# Patient Record
Sex: FEMALE | Race: WHITE | NOT HISPANIC OR LATINO | URBAN - METROPOLITAN AREA
[De-identification: names, ages, dates, MRNs, and addresses within clinical notes are randomized per-mention and may not be internally consistent; named-entity substitution may affect disease eponyms.]

---

## 2017-07-02 ENCOUNTER — INPATIENT (INPATIENT)
Facility: HOSPITAL | Age: 82
LOS: 7 days | Discharge: NURSING, REHAB OR LONG-TERM CARE | End: 2017-07-10
Attending: HOSPITALIST | Admitting: HOSPITALIST
Payer: MEDICARE

## 2017-07-02 VITALS
TEMPERATURE: 98 F | OXYGEN SATURATION: 100 % | DIASTOLIC BLOOD PRESSURE: 114 MMHG | SYSTOLIC BLOOD PRESSURE: 136 MMHG | RESPIRATION RATE: 20 BRPM | HEART RATE: 130 BPM

## 2017-07-02 DIAGNOSIS — I48.91 UNSPECIFIED ATRIAL FIBRILLATION: ICD-10-CM

## 2017-07-02 DIAGNOSIS — Z96.649 PRESENCE OF UNSPECIFIED ARTIFICIAL HIP JOINT: Chronic | ICD-10-CM

## 2017-07-02 DIAGNOSIS — I10 ESSENTIAL (PRIMARY) HYPERTENSION: ICD-10-CM

## 2017-07-02 DIAGNOSIS — E11.9 TYPE 2 DIABETES MELLITUS WITHOUT COMPLICATIONS: ICD-10-CM

## 2017-07-02 DIAGNOSIS — Z29.9 ENCOUNTER FOR PROPHYLACTIC MEASURES, UNSPECIFIED: ICD-10-CM

## 2017-07-02 DIAGNOSIS — M25.551 PAIN IN RIGHT HIP: ICD-10-CM

## 2017-07-02 DIAGNOSIS — N17.9 ACUTE KIDNEY FAILURE, UNSPECIFIED: ICD-10-CM

## 2017-07-02 LAB
ALBUMIN SERPL ELPH-MCNC: 3.9 G/DL — SIGNIFICANT CHANGE UP (ref 3.3–5)
ALP SERPL-CCNC: 103 U/L — SIGNIFICANT CHANGE UP (ref 40–120)
ALT FLD-CCNC: 9 U/L — SIGNIFICANT CHANGE UP (ref 4–33)
APTT BLD: 28.5 SEC — SIGNIFICANT CHANGE UP (ref 27.5–37.4)
AST SERPL-CCNC: 20 U/L — SIGNIFICANT CHANGE UP (ref 4–32)
BASOPHILS # BLD AUTO: 0.1 K/UL — SIGNIFICANT CHANGE UP (ref 0–0.2)
BASOPHILS NFR BLD AUTO: 1.1 % — SIGNIFICANT CHANGE UP (ref 0–2)
BILIRUB SERPL-MCNC: 0.3 MG/DL — SIGNIFICANT CHANGE UP (ref 0.2–1.2)
BLD GP AB SCN SERPL QL: NEGATIVE — SIGNIFICANT CHANGE UP
BUN SERPL-MCNC: 34 MG/DL — HIGH (ref 7–23)
CALCIUM SERPL-MCNC: 9.7 MG/DL — SIGNIFICANT CHANGE UP (ref 8.4–10.5)
CHLORIDE SERPL-SCNC: 97 MMOL/L — LOW (ref 98–107)
CK MB BLD-MCNC: 2.39 NG/ML — SIGNIFICANT CHANGE UP (ref 1–4.7)
CK MB BLD-MCNC: SIGNIFICANT CHANGE UP (ref 0–2.5)
CK SERPL-CCNC: 88 U/L — SIGNIFICANT CHANGE UP (ref 25–170)
CO2 SERPL-SCNC: 28 MMOL/L — SIGNIFICANT CHANGE UP (ref 22–31)
CREAT SERPL-MCNC: 1.56 MG/DL — HIGH (ref 0.5–1.3)
EOSINOPHIL # BLD AUTO: 0.08 K/UL — SIGNIFICANT CHANGE UP (ref 0–0.5)
EOSINOPHIL NFR BLD AUTO: 0.9 % — SIGNIFICANT CHANGE UP (ref 0–6)
GLUCOSE SERPL-MCNC: 284 MG/DL — HIGH (ref 70–99)
HCT VFR BLD CALC: 40.7 % — SIGNIFICANT CHANGE UP (ref 34.5–45)
HGB BLD-MCNC: 12.9 G/DL — SIGNIFICANT CHANGE UP (ref 11.5–15.5)
IMM GRANULOCYTES # BLD AUTO: 0.03 # — SIGNIFICANT CHANGE UP
IMM GRANULOCYTES NFR BLD AUTO: 0.3 % — SIGNIFICANT CHANGE UP (ref 0–1.5)
INR BLD: 1.03 — SIGNIFICANT CHANGE UP (ref 0.88–1.17)
LYMPHOCYTES # BLD AUTO: 1.23 K/UL — SIGNIFICANT CHANGE UP (ref 1–3.3)
LYMPHOCYTES # BLD AUTO: 14 % — SIGNIFICANT CHANGE UP (ref 13–44)
MCHC RBC-ENTMCNC: 28.2 PG — SIGNIFICANT CHANGE UP (ref 27–34)
MCHC RBC-ENTMCNC: 31.7 % — LOW (ref 32–36)
MCV RBC AUTO: 88.9 FL — SIGNIFICANT CHANGE UP (ref 80–100)
MONOCYTES # BLD AUTO: 0.75 K/UL — SIGNIFICANT CHANGE UP (ref 0–0.9)
MONOCYTES NFR BLD AUTO: 8.5 % — SIGNIFICANT CHANGE UP (ref 2–14)
NEUTROPHILS # BLD AUTO: 6.59 K/UL — SIGNIFICANT CHANGE UP (ref 1.8–7.4)
NEUTROPHILS NFR BLD AUTO: 75.2 % — SIGNIFICANT CHANGE UP (ref 43–77)
NRBC # FLD: 0 — SIGNIFICANT CHANGE UP
PLATELET # BLD AUTO: 256 K/UL — SIGNIFICANT CHANGE UP (ref 150–400)
PMV BLD: 11.8 FL — SIGNIFICANT CHANGE UP (ref 7–13)
POTASSIUM SERPL-MCNC: 3.9 MMOL/L — SIGNIFICANT CHANGE UP (ref 3.5–5.3)
POTASSIUM SERPL-SCNC: 3.9 MMOL/L — SIGNIFICANT CHANGE UP (ref 3.5–5.3)
PROT SERPL-MCNC: 7.5 G/DL — SIGNIFICANT CHANGE UP (ref 6–8.3)
PROTHROM AB SERPL-ACNC: 11.6 SEC — SIGNIFICANT CHANGE UP (ref 9.8–13.1)
RBC # BLD: 4.58 M/UL — SIGNIFICANT CHANGE UP (ref 3.8–5.2)
RBC # FLD: 14.4 % — SIGNIFICANT CHANGE UP (ref 10.3–14.5)
RH IG SCN BLD-IMP: POSITIVE — SIGNIFICANT CHANGE UP
SODIUM SERPL-SCNC: 139 MMOL/L — SIGNIFICANT CHANGE UP (ref 135–145)
TROPONIN T SERPL-MCNC: < 0.06 NG/ML — SIGNIFICANT CHANGE UP (ref 0–0.06)
WBC # BLD: 8.78 K/UL — SIGNIFICANT CHANGE UP (ref 3.8–10.5)
WBC # FLD AUTO: 8.78 K/UL — SIGNIFICANT CHANGE UP (ref 3.8–10.5)

## 2017-07-02 PROCEDURE — 73502 X-RAY EXAM HIP UNI 2-3 VIEWS: CPT | Mod: 26,RT

## 2017-07-02 PROCEDURE — 72192 CT PELVIS W/O DYE: CPT | Mod: 26

## 2017-07-02 PROCEDURE — 99223 1ST HOSP IP/OBS HIGH 75: CPT | Mod: AI

## 2017-07-02 PROCEDURE — 73552 X-RAY EXAM OF FEMUR 2/>: CPT | Mod: 26,RT

## 2017-07-02 PROCEDURE — 71020: CPT | Mod: 26

## 2017-07-02 RX ORDER — SODIUM CHLORIDE 9 MG/ML
1000 INJECTION, SOLUTION INTRAVENOUS
Qty: 0 | Refills: 0 | Status: DISCONTINUED | OUTPATIENT
Start: 2017-07-02 | End: 2017-07-10

## 2017-07-02 RX ORDER — DEXTROSE 50 % IN WATER 50 %
1 SYRINGE (ML) INTRAVENOUS ONCE
Qty: 0 | Refills: 0 | Status: DISCONTINUED | OUTPATIENT
Start: 2017-07-02 | End: 2017-07-10

## 2017-07-02 RX ORDER — DEXTROSE 50 % IN WATER 50 %
25 SYRINGE (ML) INTRAVENOUS ONCE
Qty: 0 | Refills: 0 | Status: DISCONTINUED | OUTPATIENT
Start: 2017-07-02 | End: 2017-07-10

## 2017-07-02 RX ORDER — SODIUM CHLORIDE 9 MG/ML
1000 INJECTION INTRAMUSCULAR; INTRAVENOUS; SUBCUTANEOUS
Qty: 0 | Refills: 0 | Status: DISCONTINUED | OUTPATIENT
Start: 2017-07-02 | End: 2017-07-10

## 2017-07-02 RX ORDER — ACETAMINOPHEN 500 MG
650 TABLET ORAL EVERY 6 HOURS
Qty: 0 | Refills: 0 | Status: DISCONTINUED | OUTPATIENT
Start: 2017-07-02 | End: 2017-07-10

## 2017-07-02 RX ORDER — TRAMADOL HYDROCHLORIDE 50 MG/1
25 TABLET ORAL ONCE
Qty: 0 | Refills: 0 | Status: DISCONTINUED | OUTPATIENT
Start: 2017-07-02 | End: 2017-07-02

## 2017-07-02 RX ORDER — SIMVASTATIN 20 MG/1
10 TABLET, FILM COATED ORAL AT BEDTIME
Qty: 0 | Refills: 0 | Status: DISCONTINUED | OUTPATIENT
Start: 2017-07-02 | End: 2017-07-10

## 2017-07-02 RX ORDER — ASPIRIN/CALCIUM CARB/MAGNESIUM 324 MG
81 TABLET ORAL DAILY
Qty: 0 | Refills: 0 | Status: DISCONTINUED | OUTPATIENT
Start: 2017-07-02 | End: 2017-07-02

## 2017-07-02 RX ORDER — INSULIN LISPRO 100/ML
VIAL (ML) SUBCUTANEOUS
Qty: 0 | Refills: 0 | Status: DISCONTINUED | OUTPATIENT
Start: 2017-07-02 | End: 2017-07-10

## 2017-07-02 RX ORDER — SODIUM CHLORIDE 9 MG/ML
500 INJECTION INTRAMUSCULAR; INTRAVENOUS; SUBCUTANEOUS ONCE
Qty: 0 | Refills: 0 | Status: COMPLETED | OUTPATIENT
Start: 2017-07-02 | End: 2017-07-02

## 2017-07-02 RX ORDER — MORPHINE SULFATE 50 MG/1
4 CAPSULE, EXTENDED RELEASE ORAL ONCE
Qty: 0 | Refills: 0 | Status: DISCONTINUED | OUTPATIENT
Start: 2017-07-02 | End: 2017-07-02

## 2017-07-02 RX ORDER — GLUCAGON INJECTION, SOLUTION 0.5 MG/.1ML
1 INJECTION, SOLUTION SUBCUTANEOUS ONCE
Qty: 0 | Refills: 0 | Status: DISCONTINUED | OUTPATIENT
Start: 2017-07-02 | End: 2017-07-10

## 2017-07-02 RX ORDER — INSULIN LISPRO 100/ML
VIAL (ML) SUBCUTANEOUS AT BEDTIME
Qty: 0 | Refills: 0 | Status: DISCONTINUED | OUTPATIENT
Start: 2017-07-02 | End: 2017-07-10

## 2017-07-02 RX ORDER — DEXTROSE 50 % IN WATER 50 %
12.5 SYRINGE (ML) INTRAVENOUS ONCE
Qty: 0 | Refills: 0 | Status: DISCONTINUED | OUTPATIENT
Start: 2017-07-02 | End: 2017-07-10

## 2017-07-02 RX ORDER — DILTIAZEM HCL 120 MG
360 CAPSULE, EXT RELEASE 24 HR ORAL DAILY
Qty: 0 | Refills: 0 | Status: DISCONTINUED | OUTPATIENT
Start: 2017-07-03 | End: 2017-07-10

## 2017-07-02 RX ORDER — HYDROCHLOROTHIAZIDE 25 MG
25 TABLET ORAL EVERY OTHER DAY
Qty: 0 | Refills: 0 | Status: DISCONTINUED | OUTPATIENT
Start: 2017-07-02 | End: 2017-07-10

## 2017-07-02 RX ORDER — SODIUM CHLORIDE 9 MG/ML
3 INJECTION INTRAMUSCULAR; INTRAVENOUS; SUBCUTANEOUS EVERY 8 HOURS
Qty: 0 | Refills: 0 | Status: DISCONTINUED | OUTPATIENT
Start: 2017-07-02 | End: 2017-07-10

## 2017-07-02 RX ADMIN — TRAMADOL HYDROCHLORIDE 25 MILLIGRAM(S): 50 TABLET ORAL at 22:55

## 2017-07-02 RX ADMIN — TRAMADOL HYDROCHLORIDE 25 MILLIGRAM(S): 50 TABLET ORAL at 21:55

## 2017-07-02 RX ADMIN — MORPHINE SULFATE 4 MILLIGRAM(S): 50 CAPSULE, EXTENDED RELEASE ORAL at 14:39

## 2017-07-02 RX ADMIN — SODIUM CHLORIDE 75 MILLILITER(S): 9 INJECTION INTRAMUSCULAR; INTRAVENOUS; SUBCUTANEOUS at 22:10

## 2017-07-02 RX ADMIN — MORPHINE SULFATE 4 MILLIGRAM(S): 50 CAPSULE, EXTENDED RELEASE ORAL at 14:10

## 2017-07-02 RX ADMIN — SODIUM CHLORIDE 500 MILLILITER(S): 9 INJECTION INTRAMUSCULAR; INTRAVENOUS; SUBCUTANEOUS at 14:48

## 2017-07-02 RX ADMIN — SIMVASTATIN 10 MILLIGRAM(S): 20 TABLET, FILM COATED ORAL at 22:09

## 2017-07-02 RX ADMIN — SODIUM CHLORIDE 3 MILLILITER(S): 9 INJECTION INTRAMUSCULAR; INTRAVENOUS; SUBCUTANEOUS at 21:54

## 2017-07-02 NOTE — H&P ADULT - PROBLEM SELECTOR PLAN 2
Pain management  Patient was found to have possible ilopsoas hematoma v strain on CT. Ortho consult and vascular called for further recommendations.  will hold asa for now

## 2017-07-02 NOTE — ED PROVIDER NOTE - ATTENDING CONTRIBUTION TO CARE
Dr. Velasquez: I have personally performed a face to face bedside history and physical examination of this patient. I have discussed the history, examination, review of systems, assessment and plan of management with the resident. I have reviewed the electronic medical record and amended it to reflect my history, review of systems, physical exam, assessment and plan.  87F h/o newly dx'ed Afib Dr. Velasquez: I have personally performed a face to face bedside history and physical examination of this patient. I have discussed the history, examination, review of systems, assessment and plan of management with the resident. I have reviewed the electronic medical record and amended it to reflect my history, review of systems, physical exam, assessment and plan.  87F h/o newly dx'ed Afib recently but not on AC, on diltiazem, DM, HTN, HLD p/w atraumatic R thigh pain for a while, worse today. Has h/o L hip surgery 3 years ago. Pt found to be in rapid afib with RVR 130s. Pt denies cp, sob, palps, nausea, vomiting, abdo pain. States has been unable to bear weight.  On exam pt appears well, nad, irreg irreg tachy, ctab, abdo soft/nt/nd, pelvis stable, +pain with ROM of R hip, +DP, R hip mildly external rotated but not shortened.  Plan - pain ctrl, slow down rapid afib, Leah, xray r/o fx, admit

## 2017-07-02 NOTE — ED ADULT NURSE NOTE - OBJECTIVE STATEMENT
Pt received to room 14. Pt complaining of right thigh pain. Pt states she has been having this pain for a long time. Pt states she broke her left hip 3 years ago. Pt states the pain in her right hip has got worse, no relief at home. Pt denies any recent falls. Pt denies any SOB or chest pain. Pt states she was recently diagnosed with AFIB. Pt placed on cardiac monitor.  IV access obtained, labs drawn and sent.Will continue to monitor.

## 2017-07-02 NOTE — H&P ADULT - ATTENDING COMMENTS
87 F with PMHx of DM2, HTN, L hip fx s/p ORIF 3 years ago, recent dx of Afib on ASA, had out-patient TTE, unsure of results, p/w Afib with RVR, R groin pain radiating to thigh x 1 month without hx of trauma. patient unsure if she took home meds today. rapid Afib can be secondary to missed home meds vs pain mediated. currently rate controlled after 30mg of PO Cardizem. would continue home Cardizem for now, monitor on tele, trend CE, check TSH. hold off on TTE as will attempt to obtain records from out-patient cardiologist. R hip/groin pain with prelim x-ray being unremarkable, follow up CT r/o occult Fx or bony lesion. if CT is unremarkable would consider MRI to r/o soft tissue etiology. mild TRACY likely pre-renal, start gentle hydration with NS@75cc/hr, hold home ARB in setting of TRACY, avoid other nephrotoxics and monitor Cr. PT eval pending imaging. hold home oral hypoglycemics, start NISS, check A1C. DVT PPX. fall precautions.

## 2017-07-02 NOTE — ED PROVIDER NOTE - MEDICAL DECISION MAKING DETAILS
afib with rvr, r hip pain x 3 weeks, no falls/trauma, on diltazem, will get x-ray, pain contorl, push of dilt and re-assess

## 2017-07-02 NOTE — ED PROVIDER NOTE - CRITICAL CARE PROVIDED
documentation/direct patient care (not related to procedure)/interpretation of diagnostic studies/additional history taking/consultation with other physicians/consult w/ pt's family directly relating to pts condition

## 2017-07-02 NOTE — H&P ADULT - PROBLEM SELECTOR PLAN 1
Admit to tele  check cbc, bmp, a1c, flp, tsh, trend CE   [id number] score of 5  Will hold AC now given patient with possible illopsoas hematoma on CT  Restart home jaun   D/W Dr. Linares

## 2017-07-02 NOTE — CONSULT NOTE ADULT - SUBJECTIVE AND OBJECTIVE BOX
A TEAM SURGERY (#[id number]) CONSULT NOTE  ---------------------------------------------------------------------------------------------     HPI: Patient is a 87y old  Female who presents with a chief complaint of R hip pain and weakness.  Patient has been having worsening weakness of the right hip over the past 2-3 months, with increasing pain.  She had been previously able to ambulate, and now is unable to walk secondary to the weakness.  She presents to the hospital for further evaluation, and was found to be in Afib with RVR.      Patient denies fevers/chills, denies lightheadedness/dizziness, denies SOB/chest pain, denies nausea/vomiting, denies constipation/diarrhea.     ROS: 10-system review is otherwise negative except HPI above.      PAST MEDICAL & SURGICAL HISTORY:  HLD (hyperlipidemia)  DM2 (diabetes mellitus, type 2)  HTN (hypertension)  Arthritis involving multiple sites  S/P hip replacement    FAMILY HISTORY:  [] Family history not pertinent as reviewed with the patient and family    ALLERGIES: No Known Allergies    HOME MEDICATIONS:   HCTZ, ASA, Pioglitazone, simvastatin, januvia, glipizide, irbesartan, taztia    CURRENT MEDICATIONS  MEDICATIONS (STANDING): sodium chloride 0.9% lock flush 3 milliLiter(s) IV Push every 8 hours  insulin lispro (HumaLOG) corrective regimen sliding scale   SubCutaneous three times a day before meals  insulin lispro (HumaLOG) corrective regimen sliding scale   SubCutaneous at bedtime  dextrose 5%. 1000 milliLiter(s) IV Continuous <Continuous>  dextrose 50% Injectable 12.5 Gram(s) IV Push once  dextrose 50% Injectable 25 Gram(s) IV Push once  dextrose 50% Injectable 25 Gram(s) IV Push once  simvastatin 10 milliGRAM(s) Oral at bedtime  hydrochlorothiazide 25 milliGRAM(s) Oral every other day  sodium chloride 0.9%. 1000 milliLiter(s) IV Continuous <Continuous>  diltiazem    milliGRAM(s) Oral daily    MEDICATIONS (PRN):acetaminophen   Tablet. 650 milliGRAM(s) Oral every 6 hours PRN Mild Pain (1 - 3)/Moderate/Severe Pain  dextrose Gel 1 Dose(s) Oral once PRN Blood Glucose LESS THAN 70 milliGRAM(s)/deciliter  glucagon  Injectable 1 milliGRAM(s) IntraMuscular once PRN Glucose LESS THAN 70 milligrams/deciliter    --------------------------------------------------------------------------------------------  Vitals:   T(C): 37 (07-02-17 @ 21:07), Max: 37 (07-02-17 @ 21:07)  HR: 81 (07-02-17 @ 21:07) (81 - 130)  BP: 165/85 (07-02-17 @ 21:07) (133/68 - 165/85)  BP(mean): 131 (07-02-17 @ 21:07) (131 - 131)  RR: 16 (07-02-17 @ 21:07) (16 - 20)  SpO2: 98% (07-02-17 @ 21:07) (98% - 100%)  CAPILLARY BLOOD GLUCOSE  178 (02 Jul 2017 21:27)      PHYSICAL EXAM:   General: NAD, Lying in bed comfortably  Neuro: A+Ox3  HEENT: NC/AT, EOMI  Cardio: RRR, nml S1/S2  Resp: Good effort, CTA b/l  GI/Abd: Soft, NT/ND, no rebound/guarding  Vascular: All 4 extremities warm.  Skin: Intact, no breakdown  Lymphatic/Nodes: No palpable lymphadenopathy  Musculoskeletal: All 4 extremities moving spontaneously, RLE with 1/5 strength in hip flexion, mild proximal thigh tenderness, no skin changes, no ecchymosis, distal motor/sensory intact.    --------------------------------------------------------------------------------------------  LABS  CBC (07-02 @ 13:59)                              12.9                           8.78    )----------------(  256        75.2  % Neutrophils, 14.0  % Lymphocytes, ANC: 6.59                                40.7      BMP (07-02 @ 13:59)             139     |  97<L>   |  34<H> 		Ca++ --      Ca 9.7                ---------------------------------( 284<H>		Mg --                 3.9     |  28      |  1.56<H>			Ph --        LFTs (07-02 @ 13:59)      TPro 7.5 / Alb 3.9 / TBili 0.3 / DBili -- / AST 20 / ALT 9 / AlkPhos 103    Coags (07-02 @ 14:03)  aPTT 28.5 / INR 1.03 / PT 11.6    Cardiac Markers (07-02 @ 13:59)     Trop: < 0.06 -- / CKMB: 2.39 / CK: 88    --------------------------------------------------------------------------------------------    IMAGING  < from: CT Pelvis No Cont (07.02.17 @ 17:09) >  Status post internal fixation of the left proximal femur. No obvious   fracture or dislocation, given the extent of artifact. Right iliopsoas   muscle hematoma/strain.   < end of copied text >    --------------------------------------------------------------------------------------------

## 2017-07-02 NOTE — ED PROVIDER NOTE - PMH
Arthritis involving multiple sites    DM2 (diabetes mellitus, type 2)    HLD (hyperlipidemia)    HTN (hypertension)

## 2017-07-02 NOTE — ED PROVIDER NOTE - PROGRESS NOTE DETAILS
Dr. Velasquez: Pt continues to c/o R hip pain and needs help while ambulating. Pt wanted to go home but explained importance of admission given afib with rvr, moises and trouble ambulating. Pt lives alone, family lives in [place] and is currently in [facility]". Pt agreeable w admission

## 2017-07-02 NOTE — ED PROVIDER NOTE - OBJECTIVE STATEMENT
87M with dm, htn, afib (dx last week - on dilt, on ASA) p/w r thigh pain for "A long time" broke left hip 3 years ago s/p in pin and now has r hip pain; no falls; no cp/sob/v/diarrhea/dysuria;    meds: dilt, irbasartan, januvia, asa,

## 2017-07-02 NOTE — CONSULT NOTE ADULT - ASSESSMENT
ASSESSMENT: Patient is a 87y old f with r hip pain/weakness, concern for R iliopsoas hematoma    PLAN:   - No general surgical intervention  - wound rec. repeat CT pelvis with IV contrast when TRACY resolves for better evaluation  - Patient and plan discussed with Attending, Dr. Ron Heath MD PGY3  A Team Surgery, #[id number]

## 2017-07-02 NOTE — ED PROVIDER NOTE - CARE PLAN
Principal Discharge DX:	Atrial fibrillation with RVR Principal Discharge DX:	Atrial fibrillation with RVR  Secondary Diagnosis:	TRACY (acute kidney injury)  Secondary Diagnosis:	Hip pain, acute, right

## 2017-07-02 NOTE — ED ADULT NURSE REASSESSMENT NOTE - NS ED NURSE REASSESS COMMENT FT1
RN Break Coverage: received report on pt. pt presents awake a&ox4, c/o pain to right hip. denies any further symptoms or complaints. skin warm, dry, appropriate for race. respirations even unlabored. denies  cp or sob. ivl site clean dry intact patent. cardiac monitor in place in afib. family at bedside. tele pa called to speak with patient regarding admission status. safety maintained.

## 2017-07-02 NOTE — H&P ADULT - HISTORY OF PRESENT ILLNESS
87M with DM, HTN, Afib (dx last week - on dilt, on ASA) p/w R hip pain and afib. Patient states she has been having R hip pain which radiates to her R knee x 1 month. She states she has no falls or other injuries. She was taking Tylenol for pain which provided some relief. She was following up with her PMD and had xray done. She was getting brunch this AM and could not walk secondary due to the pain. Then, she called EMS and was brought into the ED. Patient went into an episode of rapid afib in ED. patient was diagnosed with afib a week ago and was started on ASA. Denies fever, chills, cough, chest pain, falls, LOC, 87M with DM, HTN, Afib (dx last week - on dilt, on ASA) p/w R hip pain and afib. Patient states she has been having R hip pain which radiates to her R knee x 1 month. She states she has no falls or other injuries. She was taking Tylenol for pain which provided some relief. She was following up with her PMD and had xray done. She was getting brunch this AM and could not walk secondary due to the pain. Then, she called EMS and was brought into the ED. Patient went into an episode of rapid afib in ED. patient was diagnosed with afib a week ago and was started on ASA. Denies fever, chills, cough, chest pain, falls, LOC, abdominal pain, melena, hematochezi

## 2017-07-02 NOTE — ED ADULT TRIAGE NOTE - CHIEF COMPLAINT QUOTE
States"  Patient came in c/o pain to right groin and right hip radiating to right knee" denies injury. patient is a new onset Afib since 2 weeks ago, patient is not on any blood thinners.

## 2017-07-03 DIAGNOSIS — S70.11XA CONTUSION OF RIGHT THIGH, INITIAL ENCOUNTER: ICD-10-CM

## 2017-07-03 LAB
APPEARANCE UR: CLEAR — SIGNIFICANT CHANGE UP
BASOPHILS # BLD AUTO: 0.06 K/UL — SIGNIFICANT CHANGE UP (ref 0–0.2)
BASOPHILS NFR BLD AUTO: 0.7 % — SIGNIFICANT CHANGE UP (ref 0–2)
BILIRUB UR-MCNC: NEGATIVE — SIGNIFICANT CHANGE UP
BLOOD UR QL VISUAL: NEGATIVE — SIGNIFICANT CHANGE UP
BUN SERPL-MCNC: 26 MG/DL — HIGH (ref 7–23)
CALCIUM SERPL-MCNC: 9.6 MG/DL — SIGNIFICANT CHANGE UP (ref 8.4–10.5)
CHLORIDE SERPL-SCNC: 98 MMOL/L — SIGNIFICANT CHANGE UP (ref 98–107)
CHOLEST SERPL-MCNC: 141 MG/DL — SIGNIFICANT CHANGE UP (ref 120–199)
CK MB BLD-MCNC: 1.53 NG/ML — SIGNIFICANT CHANGE UP (ref 1–4.7)
CK MB BLD-MCNC: SIGNIFICANT CHANGE UP (ref 0–2.5)
CK SERPL-CCNC: 88 U/L — SIGNIFICANT CHANGE UP (ref 25–170)
CO2 SERPL-SCNC: 24 MMOL/L — SIGNIFICANT CHANGE UP (ref 22–31)
COLOR SPEC: YELLOW — SIGNIFICANT CHANGE UP
CREAT SERPL-MCNC: 1.15 MG/DL — SIGNIFICANT CHANGE UP (ref 0.5–1.3)
EOSINOPHIL # BLD AUTO: 0.1 K/UL — SIGNIFICANT CHANGE UP (ref 0–0.5)
EOSINOPHIL NFR BLD AUTO: 1.1 % — SIGNIFICANT CHANGE UP (ref 0–6)
GLUCOSE SERPL-MCNC: 202 MG/DL — HIGH (ref 70–99)
GLUCOSE UR-MCNC: 50 — SIGNIFICANT CHANGE UP
HBA1C BLD-MCNC: 8.4 % — HIGH (ref 4–5.6)
HCT VFR BLD CALC: 38.5 % — SIGNIFICANT CHANGE UP (ref 34.5–45)
HDLC SERPL-MCNC: 55 MG/DL — SIGNIFICANT CHANGE UP (ref 45–65)
HGB BLD-MCNC: 12.1 G/DL — SIGNIFICANT CHANGE UP (ref 11.5–15.5)
IMM GRANULOCYTES # BLD AUTO: 0.04 # — SIGNIFICANT CHANGE UP
IMM GRANULOCYTES NFR BLD AUTO: 0.4 % — SIGNIFICANT CHANGE UP (ref 0–1.5)
KETONES UR-MCNC: NEGATIVE — SIGNIFICANT CHANGE UP
LEUKOCYTE ESTERASE UR-ACNC: NEGATIVE — SIGNIFICANT CHANGE UP
LIPID PNL WITH DIRECT LDL SERPL: 78 MG/DL — SIGNIFICANT CHANGE UP
LYMPHOCYTES # BLD AUTO: 1.89 K/UL — SIGNIFICANT CHANGE UP (ref 1–3.3)
LYMPHOCYTES # BLD AUTO: 21.1 % — SIGNIFICANT CHANGE UP (ref 13–44)
MAGNESIUM SERPL-MCNC: 1.7 MG/DL — SIGNIFICANT CHANGE UP (ref 1.6–2.6)
MCHC RBC-ENTMCNC: 27.4 PG — SIGNIFICANT CHANGE UP (ref 27–34)
MCHC RBC-ENTMCNC: 31.4 % — LOW (ref 32–36)
MCV RBC AUTO: 87.3 FL — SIGNIFICANT CHANGE UP (ref 80–100)
MONOCYTES # BLD AUTO: 0.89 K/UL — SIGNIFICANT CHANGE UP (ref 0–0.9)
MONOCYTES NFR BLD AUTO: 9.9 % — SIGNIFICANT CHANGE UP (ref 2–14)
MUCOUS THREADS # UR AUTO: SIGNIFICANT CHANGE UP
NEUTROPHILS # BLD AUTO: 5.98 K/UL — SIGNIFICANT CHANGE UP (ref 1.8–7.4)
NEUTROPHILS NFR BLD AUTO: 66.8 % — SIGNIFICANT CHANGE UP (ref 43–77)
NITRITE UR-MCNC: NEGATIVE — SIGNIFICANT CHANGE UP
NON-SQ EPI CELLS # UR AUTO: <1 — SIGNIFICANT CHANGE UP
NRBC # FLD: 0 — SIGNIFICANT CHANGE UP
PH UR: 6.5 — SIGNIFICANT CHANGE UP (ref 4.6–8)
PHOSPHATE SERPL-MCNC: 3.3 MG/DL — SIGNIFICANT CHANGE UP (ref 2.5–4.5)
PLATELET # BLD AUTO: 212 K/UL — SIGNIFICANT CHANGE UP (ref 150–400)
PMV BLD: 11.8 FL — SIGNIFICANT CHANGE UP (ref 7–13)
POTASSIUM SERPL-MCNC: 4.1 MMOL/L — SIGNIFICANT CHANGE UP (ref 3.5–5.3)
POTASSIUM SERPL-SCNC: 4.1 MMOL/L — SIGNIFICANT CHANGE UP (ref 3.5–5.3)
PROT UR-MCNC: 30 — HIGH
RBC # BLD: 4.41 M/UL — SIGNIFICANT CHANGE UP (ref 3.8–5.2)
RBC # FLD: 14.4 % — SIGNIFICANT CHANGE UP (ref 10.3–14.5)
RBC CASTS # UR COMP ASSIST: SIGNIFICANT CHANGE UP (ref 0–?)
SODIUM SERPL-SCNC: 139 MMOL/L — SIGNIFICANT CHANGE UP (ref 135–145)
SP GR SPEC: 1.02 — SIGNIFICANT CHANGE UP (ref 1–1.03)
SQUAMOUS # UR AUTO: SIGNIFICANT CHANGE UP
TRIGL SERPL-MCNC: 51 MG/DL — SIGNIFICANT CHANGE UP (ref 10–149)
TROPONIN T SERPL-MCNC: < 0.06 NG/ML — SIGNIFICANT CHANGE UP (ref 0–0.06)
TSH SERPL-MCNC: 1.89 UIU/ML — SIGNIFICANT CHANGE UP (ref 0.27–4.2)
UROBILINOGEN FLD QL: NORMAL E.U. — SIGNIFICANT CHANGE UP (ref 0.1–0.2)
WBC # BLD: 8.96 K/UL — SIGNIFICANT CHANGE UP (ref 3.8–10.5)
WBC # FLD AUTO: 8.96 K/UL — SIGNIFICANT CHANGE UP (ref 3.8–10.5)
WBC UR QL: SIGNIFICANT CHANGE UP (ref 0–?)

## 2017-07-03 PROCEDURE — 99233 SBSQ HOSP IP/OBS HIGH 50: CPT

## 2017-07-03 RX ADMIN — Medication 360 MILLIGRAM(S): at 08:48

## 2017-07-03 RX ADMIN — SIMVASTATIN 10 MILLIGRAM(S): 20 TABLET, FILM COATED ORAL at 21:28

## 2017-07-03 RX ADMIN — SODIUM CHLORIDE 3 MILLILITER(S): 9 INJECTION INTRAMUSCULAR; INTRAVENOUS; SUBCUTANEOUS at 06:14

## 2017-07-03 NOTE — PROGRESS NOTE ADULT - PROBLEM SELECTOR PLAN 3
cont hCTZ and cardizem  Trend BP HR improving  c/w Cardizem, trend HR  Hold AC given possible hematoma

## 2017-07-03 NOTE — PHYSICAL THERAPY INITIAL EVALUATION ADULT - PERTINENT HX OF CURRENT PROBLEM, REHAB EVAL
87M with DM, HTN, Afib (dx last week - on dilt, on ASA) p/w r thigh pain and afib, Patient was found to have possible ilopsoas hematoma v strain on CT

## 2017-07-03 NOTE — PHYSICAL THERAPY INITIAL EVALUATION ADULT - DIAGNOSIS, PT EVAL
Pt admitted for Right Hip pain; pt presents with decreased strength, decreased balance, and difficulty with ambualtion

## 2017-07-03 NOTE — PHYSICAL THERAPY INITIAL EVALUATION ADULT - CRITERIA FOR SKILLED THERAPEUTIC INTERVENTIONS
rehab potential/risk reduction/prevention/functional limitations in following categories/impairments found

## 2017-07-03 NOTE — PROGRESS NOTE ADULT - SUBJECTIVE AND OBJECTIVE BOX
Patient is a 87y old  Female who presents with a chief complaint of R hip (2017 18:38)      SUBJECTIVE / OVERNIGHT EVENTS: Pt reports hip pain, improved with pain medications    MEDICATIONS  (STANDING):  sodium chloride 0.9% lock flush 3 milliLiter(s) IV Push every 8 hours  insulin lispro (HumaLOG) corrective regimen sliding scale   SubCutaneous three times a day before meals  insulin lispro (HumaLOG) corrective regimen sliding scale   SubCutaneous at bedtime  dextrose 5%. 1000 milliLiter(s) (50 mL/Hr) IV Continuous <Continuous>  dextrose 50% Injectable 12.5 Gram(s) IV Push once  dextrose 50% Injectable 25 Gram(s) IV Push once  dextrose 50% Injectable 25 Gram(s) IV Push once  simvastatin 10 milliGRAM(s) Oral at bedtime  hydrochlorothiazide 25 milliGRAM(s) Oral every other day  sodium chloride 0.9%. 1000 milliLiter(s) (75 mL/Hr) IV Continuous <Continuous>  diltiazem    milliGRAM(s) Oral daily    MEDICATIONS  (PRN):  acetaminophen   Tablet. 650 milliGRAM(s) Oral every 6 hours PRN Mild Pain (1 - 3)/Moderate/Severe Pain  dextrose Gel 1 Dose(s) Oral once PRN Blood Glucose LESS THAN 70 milliGRAM(s)/deciliter  glucagon  Injectable 1 milliGRAM(s) IntraMuscular once PRN Glucose LESS THAN 70 milligrams/deciliter  oxyCODONE  5 mG/acetaminophen 325 mG 1 Tablet(s) Oral every 4 hours PRN mod to sev pain      Vital Signs Last 24 Hrs  T(C): 36.8 (17 @ 06:13), Max: 37 (17 @ 21:07)  HR: 79 (17 @ 12:44) (79 - 95)  BP: 136/80 (17 @ 12:44) (136/72 - 165/85)  RR: 16 (17 @ 12:44) (16 - 18)  SpO2: 97% (17 @ 12:44) (97% - 99%)  CAPILLARY BLOOD GLUCOSE  215 (2017 13:02)  187 (2017 08:30)  178 (2017 21:27)        I&O's Summary      PHYSICAL EXAM:  GENERAL: NAD,   HEAD:  Normocephalic  EYES: EOMI,  conjunctiva and sclera clear  NECK: Supple,   CHEST/LUNG: Clear to auscultation bilaterally; No wheeze  HEART:  s1 s2 + Regular rate and rhythm;   ABDOMEN: Soft, Nontender, Nondistended; Bowel sounds present  EXTREMITIES:   No clubbing, cyanosis  NEURO: AAOx3      LABS:                        12.1   8.96  )-----------( 212      ( 2017 07:25 )             38.5     07-03    139  |  98  |  26<H>  ----------------------------<  202<H>  4.1   |  24  |  1.15    Ca    9.6      2017 07:25  Phos  3.3     07-  Mg     1.7     -03    TPro  7.5  /  Alb  3.9  /  TBili  0.3  /  DBili  x   /  AST  20  /  ALT  9   /  AlkPhos  103  07-02    PT/INR - ( 2017 14:03 )   PT: 11.6 SEC;   INR: 1.03          PTT - ( 2017 14:03 )  PTT:28.5 SEC  CARDIAC MARKERS ( 2017 07:25 )  x     / < 0.06 ng/mL / 88 u/L / 1.53 ng/mL / x      CARDIAC MARKERS ( 2017 13:59 )  x     / < 0.06 ng/mL / 88 u/L / 2.39 ng/mL / x          Urinalysis Basic - ( 2017 00:50 )    Color: YELLOW / Appearance: CLEAR / S.023 / pH: 6.5  Gluc: 50 / Ketone: NEGATIVE  / Bili: NEGATIVE / Urobili: NORMAL E.U.   Blood: NEGATIVE / Protein: 30 / Nitrite: NEGATIVE   Leuk Esterase: NEGATIVE / RBC: 0-2 / WBC 0-2   Sq Epi: OCC / Non Sq Epi: x / Bacteria: x      Consultant(s) Notes Reviewed:  Vascular surgery

## 2017-07-03 NOTE — PHYSICAL THERAPY INITIAL EVALUATION ADULT - ADDITIONAL COMMENTS
Pt reports that she lives alone in an apartment with no stairs to negotiate. Prior to hospital admission pt was completely independent and used a single axis cane with ambulation.     Pt left comfortable in chair, NAD, all lines intact, all precautions maintained, with call bell in reach, and RN aware of PT evaluation

## 2017-07-03 NOTE — PROGRESS NOTE ADULT - PROBLEM SELECTOR PLAN 1
pain persisting, albeit improving with pain meds  possible iliopsoas hematoma v strain on CT, will obtain CT w/c in the AM if Cr stays stable  will hold asa for now  c/w pain control pain persisting, albeit improving with pain meds  possible iliopsoas hematoma v strain on CT, will obtain CT w/c in the AM if Cr stays stable  will hold asa for now  c/w pain control  Trend hb

## 2017-07-03 NOTE — PROGRESS NOTE ADULT - SUBJECTIVE AND OBJECTIVE BOX
86yo F with R psoas hematoma    Vital Signs Last 24 Hrs  T(C): 36.8 (2017 06:13), Max: 37 (2017 21:07)  T(F): 98.3 (2017 06:13), Max: 98.6 (2017 21:07)  HR: 91 (2017 08:00) (81 - 130)  BP: 149/77 (2017 06:13) (133/68 - 165/85)  BP(mean): 131 (2017 21:07) (131 - 131)  RR: 16 (2017 08:00) (16 - 20)  SpO2: 98% (2017 06:13) (98% - 100%)      SUBJECTIVE: Pt seen and examined. Doing well. Pain improved but continues to complain of RLE weakness. Tolerating diet.    General Appearance: awake, alert, NAD  Chest: equal chest rise  Abdomen: soft, NT, ND  back: NT, no ecchymoses  Extremities: R[place]SEAU, weakness in RLE, able to elevate knee but not foot.    MEDICATIONS  (STANDING):  sodium chloride 0.9% lock flush 3 milliLiter(s) IV Push every 8 hours  insulin lispro (HumaLOG) corrective regimen sliding scale   SubCutaneous three times a day before meals  insulin lispro (HumaLOG) corrective regimen sliding scale   SubCutaneous at bedtime  dextrose 5%. 1000 milliLiter(s) (50 mL/Hr) IV Continuous <Continuous>  dextrose 50% Injectable 12.5 Gram(s) IV Push once  dextrose 50% Injectable 25 Gram(s) IV Push once  dextrose 50% Injectable 25 Gram(s) IV Push once  simvastatin 10 milliGRAM(s) Oral at bedtime  hydrochlorothiazide 25 milliGRAM(s) Oral every other day  sodium chloride 0.9%. 1000 milliLiter(s) (75 mL/Hr) IV Continuous <Continuous>  diltiazem    milliGRAM(s) Oral daily    MEDICATIONS  (PRN):  acetaminophen   Tablet. 650 milliGRAM(s) Oral every 6 hours PRN Mild Pain (1 - 3)/Moderate/Severe Pain  dextrose Gel 1 Dose(s) Oral once PRN Blood Glucose LESS THAN 70 milliGRAM(s)/deciliter  glucagon  Injectable 1 milliGRAM(s) IntraMuscular once PRN Glucose LESS THAN 70 milligrams/deciliter  oxyCODONE  5 mG/acetaminophen 325 mG 1 Tablet(s) Oral every 4 hours PRN mod to sev pain      LABS:                        12.9   8.78  )-----------( 256      ( 2017 13:59 )             40.7     07-02    139  |  97<L>  |  34<H>  ----------------------------<  284<H>  3.9   |  28  |  1.56<H>    Ca    9.7      2017 13:59    TPro  7.5  /  Alb  3.9  /  TBili  0.3  /  DBili  x   /  AST  20  /  ALT  9   /  AlkPhos  103  07-02    PT/INR - ( 2017 14:03 )   PT: 11.6 SEC;   INR: 1.03          PTT - ( 2017 14:03 )  PTT:28.5 SEC  Urinalysis Basic - ( 2017 00:50 )    Color: YELLOW / Appearance: CLEAR / S.023 / pH: 6.5  Gluc: 50 / Ketone: NEGATIVE  / Bili: NEGATIVE / Urobili: NORMAL E.U.   Blood: NEGATIVE / Protein: 30 / Nitrite: NEGATIVE   Leuk Esterase: NEGATIVE / RBC: 0-2 / WBC 0-2   Sq Epi: OCC / Non Sq Epi: x / Bacteria: x        RADIOLOGY & ADDITIONAL STUDIES:

## 2017-07-03 NOTE — PROGRESS NOTE ADULT - ASSESSMENT
88yo F with R psoas hematoma, hemodynamically stable with no signs of active bleeding    - no acute surgical intervention  - repeat CT vs MRI when TRACY resolves  - continued evaluation for RLE weakness

## 2017-07-03 NOTE — PHYSICAL THERAPY INITIAL EVALUATION ADULT - PATIENT PROFILE REVIEW, REHAB EVAL
ACTIVITY: Ambulate with Assistance; Spoke with RN May De Luna prior to PT evaluation--> Pt OK for ambulation/yes

## 2017-07-04 LAB
BACTERIA UR CULT: SIGNIFICANT CHANGE UP
BUN SERPL-MCNC: 23 MG/DL — SIGNIFICANT CHANGE UP (ref 7–23)
CALCIUM SERPL-MCNC: 9.3 MG/DL — SIGNIFICANT CHANGE UP (ref 8.4–10.5)
CHLORIDE SERPL-SCNC: 98 MMOL/L — SIGNIFICANT CHANGE UP (ref 98–107)
CO2 SERPL-SCNC: 27 MMOL/L — SIGNIFICANT CHANGE UP (ref 22–31)
CREAT SERPL-MCNC: 1.15 MG/DL — SIGNIFICANT CHANGE UP (ref 0.5–1.3)
GLUCOSE SERPL-MCNC: 201 MG/DL — HIGH (ref 70–99)
HCT VFR BLD CALC: 37.1 % — SIGNIFICANT CHANGE UP (ref 34.5–45)
HGB BLD-MCNC: 11.7 G/DL — SIGNIFICANT CHANGE UP (ref 11.5–15.5)
MCHC RBC-ENTMCNC: 27.8 PG — SIGNIFICANT CHANGE UP (ref 27–34)
MCHC RBC-ENTMCNC: 31.5 % — LOW (ref 32–36)
MCV RBC AUTO: 88.1 FL — SIGNIFICANT CHANGE UP (ref 80–100)
NRBC # FLD: 0 — SIGNIFICANT CHANGE UP
PLATELET # BLD AUTO: 211 K/UL — SIGNIFICANT CHANGE UP (ref 150–400)
PMV BLD: 11.6 FL — SIGNIFICANT CHANGE UP (ref 7–13)
POTASSIUM SERPL-MCNC: 4.3 MMOL/L — SIGNIFICANT CHANGE UP (ref 3.5–5.3)
POTASSIUM SERPL-SCNC: 4.3 MMOL/L — SIGNIFICANT CHANGE UP (ref 3.5–5.3)
RBC # BLD: 4.21 M/UL — SIGNIFICANT CHANGE UP (ref 3.8–5.2)
RBC # FLD: 14.4 % — SIGNIFICANT CHANGE UP (ref 10.3–14.5)
SODIUM SERPL-SCNC: 138 MMOL/L — SIGNIFICANT CHANGE UP (ref 135–145)
SPECIMEN SOURCE: SIGNIFICANT CHANGE UP
WBC # BLD: 10.88 K/UL — HIGH (ref 3.8–10.5)
WBC # FLD AUTO: 10.88 K/UL — HIGH (ref 3.8–10.5)

## 2017-07-04 PROCEDURE — 99233 SBSQ HOSP IP/OBS HIGH 50: CPT

## 2017-07-04 RX ADMIN — SODIUM CHLORIDE 3 MILLILITER(S): 9 INJECTION INTRAMUSCULAR; INTRAVENOUS; SUBCUTANEOUS at 22:48

## 2017-07-04 RX ADMIN — Medication 25 MILLIGRAM(S): at 12:52

## 2017-07-04 RX ADMIN — SIMVASTATIN 10 MILLIGRAM(S): 20 TABLET, FILM COATED ORAL at 22:49

## 2017-07-04 RX ADMIN — Medication 360 MILLIGRAM(S): at 07:01

## 2017-07-04 NOTE — PROGRESS NOTE ADULT - SUBJECTIVE AND OBJECTIVE BOX
Patient is a 87y old  Female who presents with a chief complaint of R hip (2017 18:38)      SUBJECTIVE / OVERNIGHT EVENTS: Notes continued right hip pain. No new complaint.    MEDICATIONS  (STANDING):  sodium chloride 0.9% lock flush 3 milliLiter(s) IV Push every 8 hours  insulin lispro (HumaLOG) corrective regimen sliding scale   SubCutaneous three times a day before meals  insulin lispro (HumaLOG) corrective regimen sliding scale   SubCutaneous at bedtime  dextrose 5%. 1000 milliLiter(s) (50 mL/Hr) IV Continuous <Continuous>  dextrose 50% Injectable 12.5 Gram(s) IV Push once  dextrose 50% Injectable 25 Gram(s) IV Push once  dextrose 50% Injectable 25 Gram(s) IV Push once  simvastatin 10 milliGRAM(s) Oral at bedtime  hydrochlorothiazide 25 milliGRAM(s) Oral every other day  sodium chloride 0.9%. 1000 milliLiter(s) (75 mL/Hr) IV Continuous <Continuous>  diltiazem    milliGRAM(s) Oral daily    MEDICATIONS  (PRN):  acetaminophen   Tablet. 650 milliGRAM(s) Oral every 6 hours PRN Mild Pain (1 - 3)/Moderate/Severe Pain  dextrose Gel 1 Dose(s) Oral once PRN Blood Glucose LESS THAN 70 milliGRAM(s)/deciliter  glucagon  Injectable 1 milliGRAM(s) IntraMuscular once PRN Glucose LESS THAN 70 milligrams/deciliter  oxyCODONE  5 mG/acetaminophen 325 mG 1 Tablet(s) Oral every 4 hours PRN mod to sev pain      Vital Signs Last 24 Hrs  T(C): 36.3 (17 @ 12:20), Max: 36.9 (17 @ 06:57)  HR: 86 (17 @ 12:20) (86 - 96)  BP: 138/81 (17 @ 12:20) (138/81 - 157/81)  RR: 18 (17 @ 12:20) (18 - 18)  SpO2: 96% (17 @ 12:20) (96% - 98%)  CAPILLARY BLOOD GLUCOSE  233 (2017 11:58)  198 (2017 08:22)  214 (2017 22:09)  179 (2017 17:41)        I&O's Summary      PHYSICAL EXAM:  GENERAL: NAD, well-developed  HEAD:  Atraumatic, Normocephalic  EYES: EOMI, PERRLA, conjunctiva and sclera clear  NECK: Supple, No JVD  CHEST/LUNG: Clear to auscultation bilaterally; No wheeze  HEART: Regular rate and rhythm; No murmurs, rubs, or gallops  ABDOMEN: Soft, Nontender, Nondistended; Bowel sounds present  EXTREMITIES:  2+ Peripheral Pulses, No clubbing, cyanosis, or edema; no tenderness, erythema or visible hematoma over RLE    LABS:                        11.7   10.88 )-----------( 211      ( 2017 07:30 )             37.1     07-04    138  |  98  |  23  ----------------------------<  201<H>  4.3   |  27  |  1.15    Ca    9.3      2017 07:30  Phos  3.3     07-03  Mg     1.7     07-03    TPro  7.5  /  Alb  3.9  /  TBili  0.3  /  DBili  x   /  AST  20  /  ALT  9   /  AlkPhos  103  07-02    PT/INR - ( 2017 14:03 )   PT: 11.6 SEC;   INR: 1.03          PTT - ( 2017 14:03 )  PTT:28.5 SEC  CARDIAC MARKERS ( 2017 07:25 )  x     / < 0.06 ng/mL / 88 u/L / 1.53 ng/mL / x      CARDIAC MARKERS ( 2017 13:59 )  x     / < 0.06 ng/mL / 88 u/L / 2.39 ng/mL / x          Urinalysis Basic - ( 2017 00:50 )    Color: YELLOW / Appearance: CLEAR / S.023 / pH: 6.5  Gluc: 50 / Ketone: NEGATIVE  / Bili: NEGATIVE / Urobili: NORMAL E.U.   Blood: NEGATIVE / Protein: 30 / Nitrite: NEGATIVE   Leuk Esterase: NEGATIVE / RBC: 0-2 / WBC 0-2   Sq Epi: OCC / Non Sq Epi: x / Bacteria: x

## 2017-07-04 NOTE — PROGRESS NOTE ADULT - PROBLEM SELECTOR PLAN 1
pain persisting, albeit improving with pain meds  possible iliopsoas hematoma v strain on CT, will obtain CT with constrast once pt's pain improved and able to tolerate study.  will hold asa for now  c/w pain control  Trend hb

## 2017-07-05 LAB
BUN SERPL-MCNC: 25 MG/DL — HIGH (ref 7–23)
CALCIUM SERPL-MCNC: 9.6 MG/DL — SIGNIFICANT CHANGE UP (ref 8.4–10.5)
CHLORIDE SERPL-SCNC: 95 MMOL/L — LOW (ref 98–107)
CO2 SERPL-SCNC: 27 MMOL/L — SIGNIFICANT CHANGE UP (ref 22–31)
CREAT SERPL-MCNC: 1.2 MG/DL — SIGNIFICANT CHANGE UP (ref 0.5–1.3)
GLUCOSE SERPL-MCNC: 196 MG/DL — HIGH (ref 70–99)
HCT VFR BLD CALC: 38.3 % — SIGNIFICANT CHANGE UP (ref 34.5–45)
HGB BLD-MCNC: 12 G/DL — SIGNIFICANT CHANGE UP (ref 11.5–15.5)
MCHC RBC-ENTMCNC: 27.4 PG — SIGNIFICANT CHANGE UP (ref 27–34)
MCHC RBC-ENTMCNC: 31.3 % — LOW (ref 32–36)
MCV RBC AUTO: 87.4 FL — SIGNIFICANT CHANGE UP (ref 80–100)
NRBC # FLD: 0 — SIGNIFICANT CHANGE UP
PLATELET # BLD AUTO: 211 K/UL — SIGNIFICANT CHANGE UP (ref 150–400)
PMV BLD: 11.5 FL — SIGNIFICANT CHANGE UP (ref 7–13)
POTASSIUM SERPL-MCNC: 4 MMOL/L — SIGNIFICANT CHANGE UP (ref 3.5–5.3)
POTASSIUM SERPL-SCNC: 4 MMOL/L — SIGNIFICANT CHANGE UP (ref 3.5–5.3)
RBC # BLD: 4.38 M/UL — SIGNIFICANT CHANGE UP (ref 3.8–5.2)
RBC # FLD: 14.1 % — SIGNIFICANT CHANGE UP (ref 10.3–14.5)
SODIUM SERPL-SCNC: 136 MMOL/L — SIGNIFICANT CHANGE UP (ref 135–145)
WBC # BLD: 8.8 K/UL — SIGNIFICANT CHANGE UP (ref 3.8–10.5)
WBC # FLD AUTO: 8.8 K/UL — SIGNIFICANT CHANGE UP (ref 3.8–10.5)

## 2017-07-05 PROCEDURE — 99232 SBSQ HOSP IP/OBS MODERATE 35: CPT

## 2017-07-05 RX ADMIN — SODIUM CHLORIDE 3 MILLILITER(S): 9 INJECTION INTRAMUSCULAR; INTRAVENOUS; SUBCUTANEOUS at 05:43

## 2017-07-05 RX ADMIN — Medication 360 MILLIGRAM(S): at 05:52

## 2017-07-05 RX ADMIN — SODIUM CHLORIDE 3 MILLILITER(S): 9 INJECTION INTRAMUSCULAR; INTRAVENOUS; SUBCUTANEOUS at 21:49

## 2017-07-05 RX ADMIN — SIMVASTATIN 10 MILLIGRAM(S): 20 TABLET, FILM COATED ORAL at 21:50

## 2017-07-05 RX ADMIN — Medication 25 MILLIGRAM(S): at 11:32

## 2017-07-05 NOTE — PROGRESS NOTE ADULT - PROBLEM SELECTOR PLAN 1
pain persisting, albeit improving with pain meds  possible iliopsoas hematoma v strain on CT, will obtain MRI for better eval  will hold asa for now  c/w pain control  Trend hb

## 2017-07-05 NOTE — PROGRESS NOTE ADULT - SUBJECTIVE AND OBJECTIVE BOX
Patient is a 87y old  Female who presents with a chief complaint of R hip (02 Jul 2017 18:38)      SUBJECTIVE / OVERNIGHT EVENTS: Pt states her hip pain is unchanged, persisting and unable to walk.    MEDICATIONS  (STANDING):  sodium chloride 0.9% lock flush 3 milliLiter(s) IV Push every 8 hours  insulin lispro (HumaLOG) corrective regimen sliding scale   SubCutaneous three times a day before meals  insulin lispro (HumaLOG) corrective regimen sliding scale   SubCutaneous at bedtime  dextrose 5%. 1000 milliLiter(s) (50 mL/Hr) IV Continuous <Continuous>  dextrose 50% Injectable 12.5 Gram(s) IV Push once  dextrose 50% Injectable 25 Gram(s) IV Push once  dextrose 50% Injectable 25 Gram(s) IV Push once  simvastatin 10 milliGRAM(s) Oral at bedtime  hydrochlorothiazide 25 milliGRAM(s) Oral every other day  sodium chloride 0.9%. 1000 milliLiter(s) (75 mL/Hr) IV Continuous <Continuous>  diltiazem    milliGRAM(s) Oral daily    MEDICATIONS  (PRN):  acetaminophen   Tablet. 650 milliGRAM(s) Oral every 6 hours PRN Mild Pain (1 - 3)/Moderate/Severe Pain  dextrose Gel 1 Dose(s) Oral once PRN Blood Glucose LESS THAN 70 milliGRAM(s)/deciliter  glucagon  Injectable 1 milliGRAM(s) IntraMuscular once PRN Glucose LESS THAN 70 milligrams/deciliter  oxyCODONE  5 mG/acetaminophen 325 mG 1 Tablet(s) Oral every 4 hours PRN mod to sev pain      Vital Signs Last 24 Hrs  T(C): 36.7 (07-05-17 @ 11:30), Max: 36.7 (07-04-17 @ 19:53)  HR: 79 (07-05-17 @ 11:30) (79 - 112)  BP: 125/58 (07-05-17 @ 11:30) (125/58 - 144/85)  RR: 18 (07-05-17 @ 11:30) (17 - 18)  SpO2: 98% (07-05-17 @ 11:30) (94% - 98%)  CAPILLARY BLOOD GLUCOSE  256 (05 Jul 2017 12:10)  174 (05 Jul 2017 08:23)  252 (04 Jul 2017 22:19)  222 (04 Jul 2017 16:48)        I&O's Summary      PHYSICAL EXAM:  GENERAL: NAD,   HEAD:  Normocephalic  EYES: EOMI,  conjunctiva and sclera clear  NECK: Supple,   CHEST/LUNG: Clear to auscultation bilaterally; No wheeze  HEART:  s1 s2 + Regular rate and rhythm;   ABDOMEN: Soft, Nontender, Nondistended; Bowel sounds present  EXTREMITIES:   No clubbing, cyanosis  NEURO: AAOx3      LABS:                        12.0   8.80  )-----------( 211      ( 05 Jul 2017 07:01 )             38.3     07-05    136  |  95<L>  |  25<H>  ----------------------------<  196<H>  4.0   |  27  |  1.20    Ca    9.6      05 Jul 2017 07:01

## 2017-07-05 NOTE — PROGRESS NOTE ADULT - PROBLEM SELECTOR PLAN 5
hold oral hypoglycemics  ISS  monitor fingersticks hold oral hypoglycemics  ISS  monitor fingersticks  Pt refusing insulin despite multiple persuasions

## 2017-07-06 LAB
BUN SERPL-MCNC: 24 MG/DL — HIGH (ref 7–23)
CALCIUM SERPL-MCNC: 9.3 MG/DL — SIGNIFICANT CHANGE UP (ref 8.4–10.5)
CHLORIDE SERPL-SCNC: 97 MMOL/L — LOW (ref 98–107)
CO2 SERPL-SCNC: 36 MMOL/L — HIGH (ref 22–31)
CREAT SERPL-MCNC: 1.09 MG/DL — SIGNIFICANT CHANGE UP (ref 0.5–1.3)
GLUCOSE SERPL-MCNC: 190 MG/DL — HIGH (ref 70–99)
HCT VFR BLD CALC: 36.1 % — SIGNIFICANT CHANGE UP (ref 34.5–45)
HGB BLD-MCNC: 11.6 G/DL — SIGNIFICANT CHANGE UP (ref 11.5–15.5)
MCHC RBC-ENTMCNC: 28.1 PG — SIGNIFICANT CHANGE UP (ref 27–34)
MCHC RBC-ENTMCNC: 32.1 % — SIGNIFICANT CHANGE UP (ref 32–36)
MCV RBC AUTO: 87.4 FL — SIGNIFICANT CHANGE UP (ref 80–100)
NRBC # FLD: 0 — SIGNIFICANT CHANGE UP
PLATELET # BLD AUTO: 222 K/UL — SIGNIFICANT CHANGE UP (ref 150–400)
PMV BLD: 11.8 FL — SIGNIFICANT CHANGE UP (ref 7–13)
POTASSIUM SERPL-MCNC: 4 MMOL/L — SIGNIFICANT CHANGE UP (ref 3.5–5.3)
POTASSIUM SERPL-SCNC: 4 MMOL/L — SIGNIFICANT CHANGE UP (ref 3.5–5.3)
RBC # BLD: 4.13 M/UL — SIGNIFICANT CHANGE UP (ref 3.8–5.2)
RBC # FLD: 14 % — SIGNIFICANT CHANGE UP (ref 10.3–14.5)
SODIUM SERPL-SCNC: 138 MMOL/L — SIGNIFICANT CHANGE UP (ref 135–145)
WBC # BLD: 8.01 K/UL — SIGNIFICANT CHANGE UP (ref 3.8–10.5)
WBC # FLD AUTO: 8.01 K/UL — SIGNIFICANT CHANGE UP (ref 3.8–10.5)

## 2017-07-06 PROCEDURE — 99232 SBSQ HOSP IP/OBS MODERATE 35: CPT

## 2017-07-06 PROCEDURE — 73721 MRI JNT OF LWR EXTRE W/O DYE: CPT | Mod: 26,53,RT

## 2017-07-06 RX ADMIN — Medication 360 MILLIGRAM(S): at 06:13

## 2017-07-06 RX ADMIN — SIMVASTATIN 10 MILLIGRAM(S): 20 TABLET, FILM COATED ORAL at 21:55

## 2017-07-06 NOTE — PROGRESS NOTE ADULT - SUBJECTIVE AND OBJECTIVE BOX
Patient is a 87y old  Female who presents with a chief complaint of R hip (02 Jul 2017 18:38)      SUBJECTIVE / OVERNIGHT EVENTS: Pt reports persisting pain and inability to ambulate    MEDICATIONS  (STANDING):  sodium chloride 0.9% lock flush 3 milliLiter(s) IV Push every 8 hours  insulin lispro (HumaLOG) corrective regimen sliding scale   SubCutaneous three times a day before meals  insulin lispro (HumaLOG) corrective regimen sliding scale   SubCutaneous at bedtime  dextrose 5%. 1000 milliLiter(s) (50 mL/Hr) IV Continuous <Continuous>  dextrose 50% Injectable 12.5 Gram(s) IV Push once  dextrose 50% Injectable 25 Gram(s) IV Push once  dextrose 50% Injectable 25 Gram(s) IV Push once  simvastatin 10 milliGRAM(s) Oral at bedtime  hydrochlorothiazide 25 milliGRAM(s) Oral every other day  sodium chloride 0.9%. 1000 milliLiter(s) (75 mL/Hr) IV Continuous <Continuous>  diltiazem    milliGRAM(s) Oral daily    MEDICATIONS  (PRN):  acetaminophen   Tablet. 650 milliGRAM(s) Oral every 6 hours PRN Mild Pain (1 - 3)/Moderate/Severe Pain  dextrose Gel 1 Dose(s) Oral once PRN Blood Glucose LESS THAN 70 milliGRAM(s)/deciliter  glucagon  Injectable 1 milliGRAM(s) IntraMuscular once PRN Glucose LESS THAN 70 milligrams/deciliter  oxyCODONE  5 mG/acetaminophen 325 mG 1 Tablet(s) Oral every 4 hours PRN mod to sev pain      Vital Signs Last 24 Hrs  T(C): 36.9 (07-06-17 @ 12:37), Max: 37.2 (07-05-17 @ 21:47)  HR: 97 (07-06-17 @ 12:37) (97 - 115)  BP: 142/83 (07-06-17 @ 12:37) (136/69 - 142/83)  RR: 18 (07-06-17 @ 12:37) (17 - 18)  SpO2: 98% (07-06-17 @ 12:37) (94% - 98%)  CAPILLARY BLOOD GLUCOSE  282 (06 Jul 2017 11:51)  191 (06 Jul 2017 08:30)  265 (05 Jul 2017 21:27)  194 (05 Jul 2017 17:05)      PHYSICAL EXAM:  GENERAL: NAD,   HEAD:  Normocephalic  EYES: EOMI,  conjunctiva and sclera clear  NECK: Supple,   CHEST/LUNG: Clear to auscultation bilaterally; No wheeze  HEART:  s1 s2 + Regular rate and rhythm;   ABDOMEN: Soft, Nontender, Nondistended; Bowel sounds present  EXTREMITIES:   Limited ROM of r hip due to pain      LABS:                        11.6   8.01  )-----------( 222      ( 06 Jul 2017 06:00 )             36.1     07-06    138  |  97<L>  |  24<H>  ----------------------------<  190<H>  4.0   |  36<H>  |  1.09    Ca    9.3      06 Jul 2017 06:00

## 2017-07-06 NOTE — PROGRESS NOTE ADULT - PROBLEM SELECTOR PLAN 5
hold oral hypoglycemics  ISS  monitor fingersticks  Pt refusing insulin despite multiple persuasions

## 2017-07-06 NOTE — PROGRESS NOTE ADULT - PROBLEM SELECTOR PLAN 1
pain persisting, albeit improving with pain meds  possible iliopsoas hematoma v strain on CT, f/u MRI for better eval  will hold asa for now  c/w pain control  Trend hb

## 2017-07-07 LAB
BUN SERPL-MCNC: 23 MG/DL — SIGNIFICANT CHANGE UP (ref 7–23)
CALCIUM SERPL-MCNC: 9.3 MG/DL — SIGNIFICANT CHANGE UP (ref 8.4–10.5)
CHLORIDE SERPL-SCNC: 95 MMOL/L — LOW (ref 98–107)
CO2 SERPL-SCNC: 29 MMOL/L — SIGNIFICANT CHANGE UP (ref 22–31)
CREAT SERPL-MCNC: 1.14 MG/DL — SIGNIFICANT CHANGE UP (ref 0.5–1.3)
GLUCOSE SERPL-MCNC: 196 MG/DL — HIGH (ref 70–99)
HCT VFR BLD CALC: 36.5 % — SIGNIFICANT CHANGE UP (ref 34.5–45)
HGB BLD-MCNC: 11.6 G/DL — SIGNIFICANT CHANGE UP (ref 11.5–15.5)
MCHC RBC-ENTMCNC: 27.8 PG — SIGNIFICANT CHANGE UP (ref 27–34)
MCHC RBC-ENTMCNC: 31.8 % — LOW (ref 32–36)
MCV RBC AUTO: 87.3 FL — SIGNIFICANT CHANGE UP (ref 80–100)
NRBC # FLD: 0 — SIGNIFICANT CHANGE UP
PLATELET # BLD AUTO: 247 K/UL — SIGNIFICANT CHANGE UP (ref 150–400)
PMV BLD: 11.5 FL — SIGNIFICANT CHANGE UP (ref 7–13)
POTASSIUM SERPL-MCNC: 3.9 MMOL/L — SIGNIFICANT CHANGE UP (ref 3.5–5.3)
POTASSIUM SERPL-SCNC: 3.9 MMOL/L — SIGNIFICANT CHANGE UP (ref 3.5–5.3)
RBC # BLD: 4.18 M/UL — SIGNIFICANT CHANGE UP (ref 3.8–5.2)
RBC # FLD: 14 % — SIGNIFICANT CHANGE UP (ref 10.3–14.5)
SODIUM SERPL-SCNC: 138 MMOL/L — SIGNIFICANT CHANGE UP (ref 135–145)
WBC # BLD: 7.71 K/UL — SIGNIFICANT CHANGE UP (ref 3.8–10.5)
WBC # FLD AUTO: 7.71 K/UL — SIGNIFICANT CHANGE UP (ref 3.8–10.5)

## 2017-07-07 PROCEDURE — 99232 SBSQ HOSP IP/OBS MODERATE 35: CPT

## 2017-07-07 RX ORDER — ASPIRIN/CALCIUM CARB/MAGNESIUM 324 MG
81 TABLET ORAL DAILY
Qty: 0 | Refills: 0 | Status: DISCONTINUED | OUTPATIENT
Start: 2017-07-07 | End: 2017-07-10

## 2017-07-07 RX ADMIN — SODIUM CHLORIDE 3 MILLILITER(S): 9 INJECTION INTRAMUSCULAR; INTRAVENOUS; SUBCUTANEOUS at 07:16

## 2017-07-07 RX ADMIN — Medication 25 MILLIGRAM(S): at 12:03

## 2017-07-07 RX ADMIN — Medication 81 MILLIGRAM(S): at 17:37

## 2017-07-07 RX ADMIN — Medication 360 MILLIGRAM(S): at 07:24

## 2017-07-07 RX ADMIN — SIMVASTATIN 10 MILLIGRAM(S): 20 TABLET, FILM COATED ORAL at 22:31

## 2017-07-07 NOTE — PROGRESS NOTE ADULT - SUBJECTIVE AND OBJECTIVE BOX
Patient is a 87y old  Female who presents with a chief complaint of R hip (02 Jul 2017 18:38)      SUBJECTIVE / OVERNIGHT EVENTS: Pt pain is improved    MEDICATIONS  (STANDING):  sodium chloride 0.9% lock flush 3 milliLiter(s) IV Push every 8 hours  insulin lispro (HumaLOG) corrective regimen sliding scale   SubCutaneous three times a day before meals  insulin lispro (HumaLOG) corrective regimen sliding scale   SubCutaneous at bedtime  dextrose 5%. 1000 milliLiter(s) (50 mL/Hr) IV Continuous <Continuous>  dextrose 50% Injectable 12.5 Gram(s) IV Push once  dextrose 50% Injectable 25 Gram(s) IV Push once  dextrose 50% Injectable 25 Gram(s) IV Push once  simvastatin 10 milliGRAM(s) Oral at bedtime  hydrochlorothiazide 25 milliGRAM(s) Oral every other day  sodium chloride 0.9%. 1000 milliLiter(s) (75 mL/Hr) IV Continuous <Continuous>  diltiazem    milliGRAM(s) Oral daily    MEDICATIONS  (PRN):  acetaminophen   Tablet. 650 milliGRAM(s) Oral every 6 hours PRN Mild Pain (1 - 3)/Moderate/Severe Pain  dextrose Gel 1 Dose(s) Oral once PRN Blood Glucose LESS THAN 70 milliGRAM(s)/deciliter  glucagon  Injectable 1 milliGRAM(s) IntraMuscular once PRN Glucose LESS THAN 70 milligrams/deciliter  oxyCODONE  5 mG/acetaminophen 325 mG 1 Tablet(s) Oral every 4 hours PRN mod to sev pain      Vital Signs Last 24 Hrs  T(C): 36.8 (07-07-17 @ 12:01), Max: 36.9 (07-06-17 @ 21:46)  HR: 60 (07-07-17 @ 12:01) (60 - 83)  BP: 140/80 (07-07-17 @ 12:01) (112/63 - 140/80)  RR: 17 (07-07-17 @ 12:01) (17 - 18)  SpO2: 98% (07-07-17 @ 12:01) (98% - 98%)  CAPILLARY BLOOD GLUCOSE  337 (07 Jul 2017 12:04)  209 (07 Jul 2017 08:43)  215 (06 Jul 2017 21:17)  226 (06 Jul 2017 17:00)        I&O's Summary      PHYSICAL EXAM:  GENERAL: NAD,   HEAD:  Normocephalic  EYES: EOMI,  conjunctiva and sclera clear  NECK: Supple,   CHEST/LUNG: Clear to auscultation bilaterally; No wheeze  HEART:  s1 s2 + Regular rate and rhythm;   ABDOMEN: Soft, Nontender, Nondistended; Bowel sounds present  EXTREMITIES:   No clubbing, cyanosis  NEURO: AAOx3      LABS:                        11.6   7.71  )-----------( 247      ( 07 Jul 2017 05:30 )             36.5     07-07    138  |  95<L>  |  23  ----------------------------<  196<H>  3.9   |  29  |  1.14    Ca    9.3      07 Jul 2017 05:30          Consultant(s) Notes Reviewed:  Ortho

## 2017-07-07 NOTE — CONSULT NOTE ADULT - SUBJECTIVE AND OBJECTIVE BOX
87 year old female presented to the ED with Afib and right hip pain. Atrial Fibrillation is currently being managed by medical team. However, she has continued to experience right hip pain. She reports that she has experienced right hip pain for "a few months". There was no preceding fall. She was previously on prednisone for 6 months that stopped in January. She previously ambulated with a cane, but has experienced difficulty walking over the past week. Patient had a right hip fracture treated by Dr. Vigil in 2014 with IM Nail.    PMH: HTN, HLD, DM2, Afib  PSH: L IM Nail (2014)  ALL: NKDA    Vital Signs Last 24 Hrs  T(C): 36.8 (07 Jul 2017 12:01), Max: 36.9 (06 Jul 2017 21:46)  T(F): 98.2 (07 Jul 2017 12:01), Max: 98.4 (06 Jul 2017 21:46)  HR: 60 (07 Jul 2017 12:01) (60 - 83)  BP: 140/80 (07 Jul 2017 12:01) (112/63 - 140/80)  BP(mean): --  RR: 17 (07 Jul 2017 12:01) (17 - 18)  SpO2: 98% (07 Jul 2017 12:01) (98% - 98%)    MRI: R distal psoas tendon tear    Exam:  Gen: NAD, ecchymoses over medial thigh  Motor: 5/5 EHL/FHL/TA/Gastrocnemius, 3/5 hip flexion  Sensory: SILT DP/SP/S/S/T Nerve Distributions    A/P: 87 year old female with right iliopsoas tendon tear  - Pain control  - Protected Weight Bearing with Walker  - PT/OT  - OOB  - No acute orthopedic intervention  - Follow up with Dr. Vigil as outpatient. [phone]

## 2017-07-08 PROCEDURE — 99232 SBSQ HOSP IP/OBS MODERATE 35: CPT

## 2017-07-08 RX ADMIN — Medication 360 MILLIGRAM(S): at 06:43

## 2017-07-08 RX ADMIN — SODIUM CHLORIDE 3 MILLILITER(S): 9 INJECTION INTRAMUSCULAR; INTRAVENOUS; SUBCUTANEOUS at 13:36

## 2017-07-08 RX ADMIN — SIMVASTATIN 10 MILLIGRAM(S): 20 TABLET, FILM COATED ORAL at 22:06

## 2017-07-08 RX ADMIN — Medication 81 MILLIGRAM(S): at 11:18

## 2017-07-08 NOTE — PROGRESS NOTE ADULT - PROBLEM SELECTOR PLAN 1
pain persisting, albeit improving with pain meds  MRI shows hematoma with Iliopsoas tear, s/p Ortho eval, outpt f/u with Ortho  ASA restarted, will trend Hb, if Hb drops will d/c ASA  c/w pain control  Trend hb

## 2017-07-08 NOTE — PROGRESS NOTE ADULT - SUBJECTIVE AND OBJECTIVE BOX
Patient is a 87y old  Female who presents with a chief complaint of R hip (02 Jul 2017 18:38)      SUBJECTIVE / OVERNIGHT EVENTS: Pt bothered about right thigh ecchymosis    MEDICATIONS  (STANDING):  sodium chloride 0.9% lock flush 3 milliLiter(s) IV Push every 8 hours  insulin lispro (HumaLOG) corrective regimen sliding scale   SubCutaneous three times a day before meals  insulin lispro (HumaLOG) corrective regimen sliding scale   SubCutaneous at bedtime  dextrose 5%. 1000 milliLiter(s) (50 mL/Hr) IV Continuous <Continuous>  dextrose 50% Injectable 12.5 Gram(s) IV Push once  dextrose 50% Injectable 25 Gram(s) IV Push once  dextrose 50% Injectable 25 Gram(s) IV Push once  simvastatin 10 milliGRAM(s) Oral at bedtime  hydrochlorothiazide 25 milliGRAM(s) Oral every other day  sodium chloride 0.9%. 1000 milliLiter(s) (75 mL/Hr) IV Continuous <Continuous>  diltiazem    milliGRAM(s) Oral daily  aspirin enteric coated 81 milliGRAM(s) Oral daily    MEDICATIONS  (PRN):  acetaminophen   Tablet. 650 milliGRAM(s) Oral every 6 hours PRN Mild Pain (1 - 3)/Moderate/Severe Pain  dextrose Gel 1 Dose(s) Oral once PRN Blood Glucose LESS THAN 70 milliGRAM(s)/deciliter  glucagon  Injectable 1 milliGRAM(s) IntraMuscular once PRN Glucose LESS THAN 70 milligrams/deciliter  oxyCODONE  5 mG/acetaminophen 325 mG 1 Tablet(s) Oral every 4 hours PRN mod to sev pain      Vital Signs Last 24 Hrs  T(C): 36.7 (07-08-17 @ 11:15), Max: 36.8 (07-07-17 @ 22:18)  HR: 86 (07-08-17 @ 11:15) (75 - 86)  BP: 108/72 (07-08-17 @ 11:15) (108/72 - 146/89)  RR: 18 (07-08-17 @ 11:15) (17 - 18)  SpO2: 98% (07-08-17 @ 11:15) (98% - 98%)  CAPILLARY BLOOD GLUCOSE  286 (08 Jul 2017 11:55)  212 (08 Jul 2017 08:22)  287 (07 Jul 2017 21:51)  224 (07 Jul 2017 17:26)        I&O's Summary      PHYSICAL EXAM:  GENERAL: NAD,   HEAD:  Normocephalic  EYES: EOMI,  conjunctiva and sclera clear  NECK: Supple,   CHEST/LUNG: Clear to auscultation bilaterally; No wheeze  HEART:  s1 s2 + Regular rate and rhythm;   ABDOMEN: Soft, Nontender, Nondistended; Bowel sounds present  EXTREMITIES:   No clubbing, cyanosis  NEURO: AAOx3 forgetful at times  SKIN: right thigh ecchymoses borders marked      LABS:                        11.6   7.71  )-----------( 247      ( 07 Jul 2017 05:30 )             36.5     07-07    138  |  95<L>  |  23  ----------------------------<  196<H>  3.9   |  29  |  1.14    Ca    9.3      07 Jul 2017 05:30

## 2017-07-09 PROCEDURE — 99232 SBSQ HOSP IP/OBS MODERATE 35: CPT

## 2017-07-09 RX ADMIN — Medication 81 MILLIGRAM(S): at 11:33

## 2017-07-09 RX ADMIN — Medication 360 MILLIGRAM(S): at 06:42

## 2017-07-09 RX ADMIN — SIMVASTATIN 10 MILLIGRAM(S): 20 TABLET, FILM COATED ORAL at 21:40

## 2017-07-09 RX ADMIN — Medication 25 MILLIGRAM(S): at 11:33

## 2017-07-09 NOTE — PROGRESS NOTE ADULT - SUBJECTIVE AND OBJECTIVE BOX
Patient is a 87y old  Female who presents with a chief complaint of R hip (02 Jul 2017 18:38)      SUBJECTIVE / OVERNIGHT EVENTS: Pain is improving    MEDICATIONS  (STANDING):  sodium chloride 0.9% lock flush 3 milliLiter(s) IV Push every 8 hours  insulin lispro (HumaLOG) corrective regimen sliding scale   SubCutaneous three times a day before meals  insulin lispro (HumaLOG) corrective regimen sliding scale   SubCutaneous at bedtime  dextrose 5%. 1000 milliLiter(s) (50 mL/Hr) IV Continuous <Continuous>  dextrose 50% Injectable 12.5 Gram(s) IV Push once  dextrose 50% Injectable 25 Gram(s) IV Push once  dextrose 50% Injectable 25 Gram(s) IV Push once  simvastatin 10 milliGRAM(s) Oral at bedtime  hydrochlorothiazide 25 milliGRAM(s) Oral every other day  sodium chloride 0.9%. 1000 milliLiter(s) (75 mL/Hr) IV Continuous <Continuous>  diltiazem    milliGRAM(s) Oral daily  aspirin enteric coated 81 milliGRAM(s) Oral daily    MEDICATIONS  (PRN):  acetaminophen   Tablet. 650 milliGRAM(s) Oral every 6 hours PRN Mild Pain (1 - 3)/Moderate/Severe Pain  dextrose Gel 1 Dose(s) Oral once PRN Blood Glucose LESS THAN 70 milliGRAM(s)/deciliter  glucagon  Injectable 1 milliGRAM(s) IntraMuscular once PRN Glucose LESS THAN 70 milligrams/deciliter  oxyCODONE  5 mG/acetaminophen 325 mG 1 Tablet(s) Oral every 4 hours PRN mod to sev pain      Vital Signs Last 24 Hrs  T(C): 36.7 (07-09-17 @ 06:24), Max: 36.7 (07-08-17 @ 11:15)  HR: 65 (07-09-17 @ 06:24) (65 - 86)  BP: 147/79 (07-09-17 @ 06:24) (108/72 - 147/79)  RR: 18 (07-09-17 @ 06:24) (18 - 18)  SpO2: 98% (07-09-17 @ 06:24) (98% - 98%)  CAPILLARY BLOOD GLUCOSE  198 (09 Jul 2017 08:26)  285 (08 Jul 2017 21:49)  244 (08 Jul 2017 17:01)  286 (08 Jul 2017 11:55)        I&O's Summary      PHYSICAL EXAM:  GENERAL: NAD,   HEAD:  Normocephalic  EYES: EOMI,  conjunctiva and sclera clear  NECK: Supple,   CHEST/LUNG: Clear to auscultation bilaterally; No wheeze  HEART:  s1 s2 + Regular rate and rhythm;   ABDOMEN: Soft, Nontender, Nondistended; Bowel sounds present  EXTREMITIES:   No clubbing, cyanosis  NEURO: AAOx3  SKIn: Right thigh ecchymoses improving, not going beyond marked borders    LABS:

## 2017-07-09 NOTE — PROGRESS NOTE ADULT - PROBLEM SELECTOR PLAN 1
pain  improving with pain meds  MRI shows hematoma with Iliopsoas tear, s/p Ortho eval, outpt f/u with Ortho  ASA restarted, will trend Hb ( pt refusing labs at times), if Hb drops will d/c ASA  c/w pain control  Trend hb  Pt agreed to rehab

## 2017-07-10 VITALS
RESPIRATION RATE: 18 BRPM | TEMPERATURE: 98 F | DIASTOLIC BLOOD PRESSURE: 61 MMHG | OXYGEN SATURATION: 96 % | HEART RATE: 67 BPM | SYSTOLIC BLOOD PRESSURE: 122 MMHG

## 2017-07-10 DIAGNOSIS — K59.00 CONSTIPATION, UNSPECIFIED: ICD-10-CM

## 2017-07-10 LAB
BUN SERPL-MCNC: 25 MG/DL — HIGH (ref 7–23)
CALCIUM SERPL-MCNC: 9.6 MG/DL — SIGNIFICANT CHANGE UP (ref 8.4–10.5)
CHLORIDE SERPL-SCNC: 97 MMOL/L — LOW (ref 98–107)
CO2 SERPL-SCNC: 28 MMOL/L — SIGNIFICANT CHANGE UP (ref 22–31)
CREAT SERPL-MCNC: 1.16 MG/DL — SIGNIFICANT CHANGE UP (ref 0.5–1.3)
GLUCOSE SERPL-MCNC: 216 MG/DL — HIGH (ref 70–99)
HCT VFR BLD CALC: 36.4 % — SIGNIFICANT CHANGE UP (ref 34.5–45)
HGB BLD-MCNC: 11.5 G/DL — SIGNIFICANT CHANGE UP (ref 11.5–15.5)
MCHC RBC-ENTMCNC: 27.3 PG — SIGNIFICANT CHANGE UP (ref 27–34)
MCHC RBC-ENTMCNC: 31.6 % — LOW (ref 32–36)
MCV RBC AUTO: 86.5 FL — SIGNIFICANT CHANGE UP (ref 80–100)
NRBC # FLD: 0 — SIGNIFICANT CHANGE UP
PLATELET # BLD AUTO: 279 K/UL — SIGNIFICANT CHANGE UP (ref 150–400)
PMV BLD: 11.5 FL — SIGNIFICANT CHANGE UP (ref 7–13)
POTASSIUM SERPL-MCNC: 4 MMOL/L — SIGNIFICANT CHANGE UP (ref 3.5–5.3)
POTASSIUM SERPL-SCNC: 4 MMOL/L — SIGNIFICANT CHANGE UP (ref 3.5–5.3)
RBC # BLD: 4.21 M/UL — SIGNIFICANT CHANGE UP (ref 3.8–5.2)
RBC # FLD: 13.9 % — SIGNIFICANT CHANGE UP (ref 10.3–14.5)
SODIUM SERPL-SCNC: 139 MMOL/L — SIGNIFICANT CHANGE UP (ref 135–145)
WBC # BLD: 7.63 K/UL — SIGNIFICANT CHANGE UP (ref 3.8–10.5)
WBC # FLD AUTO: 7.63 K/UL — SIGNIFICANT CHANGE UP (ref 3.8–10.5)

## 2017-07-10 PROCEDURE — 99239 HOSP IP/OBS DSCHRG MGMT >30: CPT

## 2017-07-10 RX ORDER — DOCUSATE SODIUM 100 MG
1 CAPSULE ORAL
Qty: 0 | Refills: 0 | COMMUNITY
Start: 2017-07-10

## 2017-07-10 RX ORDER — ASPIRIN/CALCIUM CARB/MAGNESIUM 324 MG
1 TABLET ORAL
Qty: 0 | Refills: 0 | COMMUNITY
Start: 2017-07-10

## 2017-07-10 RX ORDER — ACETAMINOPHEN 500 MG
2 TABLET ORAL
Qty: 0 | Refills: 0 | COMMUNITY

## 2017-07-10 RX ORDER — DOCUSATE SODIUM 100 MG
100 CAPSULE ORAL
Qty: 0 | Refills: 0 | Status: DISCONTINUED | OUTPATIENT
Start: 2017-07-10 | End: 2017-07-10

## 2017-07-10 RX ORDER — DILTIAZEM HCL 120 MG
1 CAPSULE, EXT RELEASE 24 HR ORAL
Qty: 0 | Refills: 0 | COMMUNITY
Start: 2017-07-10

## 2017-07-10 RX ORDER — DILTIAZEM HCL 120 MG
1 CAPSULE, EXT RELEASE 24 HR ORAL
Qty: 0 | Refills: 0 | DISCHARGE
Start: 2017-07-10

## 2017-07-10 RX ORDER — SIMVASTATIN 20 MG/1
1 TABLET, FILM COATED ORAL
Qty: 0 | Refills: 0 | DISCHARGE
Start: 2017-07-10

## 2017-07-10 RX ORDER — SIMVASTATIN 20 MG/1
1 TABLET, FILM COATED ORAL
Qty: 0 | Refills: 0 | COMMUNITY

## 2017-07-10 RX ORDER — ASPIRIN/CALCIUM CARB/MAGNESIUM 324 MG
1 TABLET ORAL
Qty: 0 | Refills: 0 | COMMUNITY

## 2017-07-10 RX ORDER — ACETAMINOPHEN 500 MG
2 TABLET ORAL
Qty: 0 | Refills: 0 | COMMUNITY
Start: 2017-07-10

## 2017-07-10 RX ADMIN — Medication 81 MILLIGRAM(S): at 11:51

## 2017-07-10 RX ADMIN — Medication 360 MILLIGRAM(S): at 05:40

## 2017-07-10 NOTE — DISCHARGE NOTE ADULT - PATIENT PORTAL LINK FT
“You can access the [facility] Patient Portal, offered by [facility], by registering with the following website: [email or web address]”

## 2017-07-10 NOTE — PROGRESS NOTE ADULT - PROBLEM SELECTOR PROBLEM 6
DM2 (diabetes mellitus, type 2)
Need for prophylactic measure

## 2017-07-10 NOTE — DISCHARGE NOTE ADULT - COMMUNITY RESOURCES
Jose [address] [place] [address] , [place], [phone], [facility] [phone]

## 2017-07-10 NOTE — PROGRESS NOTE ADULT - SUBJECTIVE AND OBJECTIVE BOX
Patient is a 87y old  Female who presents with a chief complaint of R hip (02 Jul 2017 18:38)      SUBJECTIVE / OVERNIGHT EVENTS: No acute events overnight. Still having some right groin pain mainly with movement. No chest pain, palpitations, SOB, N/V/D. Hasn't had BM in 4 days.    MEDICATIONS  (STANDING):  sodium chloride 0.9% lock flush 3 milliLiter(s) IV Push every 8 hours  insulin lispro (HumaLOG) corrective regimen sliding scale   SubCutaneous three times a day before meals  insulin lispro (HumaLOG) corrective regimen sliding scale   SubCutaneous at bedtime  dextrose 5%. 1000 milliLiter(s) (50 mL/Hr) IV Continuous <Continuous>  dextrose 50% Injectable 12.5 Gram(s) IV Push once  dextrose 50% Injectable 25 Gram(s) IV Push once  dextrose 50% Injectable 25 Gram(s) IV Push once  simvastatin 10 milliGRAM(s) Oral at bedtime  hydrochlorothiazide 25 milliGRAM(s) Oral every other day  sodium chloride 0.9%. 1000 milliLiter(s) (75 mL/Hr) IV Continuous <Continuous>  diltiazem    milliGRAM(s) Oral daily  aspirin enteric coated 81 milliGRAM(s) Oral daily  docusate sodium 100 milliGRAM(s) Oral two times a day    MEDICATIONS  (PRN):  acetaminophen   Tablet. 650 milliGRAM(s) Oral every 6 hours PRN Mild Pain (1 - 3)/Moderate/Severe Pain  dextrose Gel 1 Dose(s) Oral once PRN Blood Glucose LESS THAN 70 milliGRAM(s)/deciliter  glucagon  Injectable 1 milliGRAM(s) IntraMuscular once PRN Glucose LESS THAN 70 milligrams/deciliter  oxyCODONE  5 mG/acetaminophen 325 mG 1 Tablet(s) Oral every 4 hours PRN mod to sev pain      Vital Signs Last 24 Hrs  T(C): 36.5 (07-10-17 @ 12:52), Max: 36.7 (07-09-17 @ 21:41)  HR: 67 (07-10-17 @ 12:52) (65 - 85)  BP: 122/61 (07-10-17 @ 12:52) (117/60 - 155/77)  RR: 18 (07-10-17 @ 12:52) (17 - 18)  SpO2: 96% (07-10-17 @ 12:52) (95% - 97%)  CAPILLARY BLOOD GLUCOSE  340 (10 Jul 2017 12:30)  203 (10 Jul 2017 08:37)  222 (09 Jul 2017 21:34)        I&O's Summary      PHYSICAL EXAM:  GENERAL: no apparent distress, on room air, seated in bedside chair  CHEST/LUNG: Clear to auscultation bilaterally; No wheezing or crackles  HEART: S1/S2; No murmurs, rubs, or gallops  ABDOMEN: Soft, Nontender, Nondistended; Bowel sounds present  EXTREMITIES:  2+ Peripheral Pulses, No clubbing, cyanosis, or edema  NEUROLOGY: awake, alert, responds to Qs appropriately, able to move all extremities, flexion/ extension of b/l feet preserved, 5/5 muscle strength of LLE, decreased strength of RLE due to pain with hip flexion  SKIN: ecchymosis of right medial thigh, improved based on markings made previously, nontender    LABS:                        11.5   7.63  )-----------( 279      ( 10 Jul 2017 06:15 )             36.4     07-10    139  |  97<L>  |  25<H>  ----------------------------<  216<H>  4.0   |  28  |  1.16    Ca    9.6      10 Jul 2017 06:15    RADIOLOGY & ADDITIONAL TESTS: < from: MRI Hip w/o Cont, Right Disc (07.06.17 @ 16:35) >  Torn proximally retracted distal right iliopsoas tendon with associated   surrounding hematoma formation and reactive edema extending into the   right iliacus muscle and proximal abductor muscles.    No acute fracturesor dislocations.    < end of copied text >

## 2017-07-10 NOTE — PROGRESS NOTE ADULT - ATTENDING COMMENTS
stable for DC to rehab today  DC time spent: 35 minutes stable for DC to rehab today. Attempted to call daughterLeigh to inform of patient DC to rehab today: no answer  DC time spent: 35 minutes

## 2017-07-10 NOTE — PROGRESS NOTE ADULT - PROBLEM SELECTOR PLAN 6
hold oral hypoglycemics: resume on DC  ISS   monitor fingersticks  Pt refusing insulin despite multiple persuasions hold oral hypoglycemics: resume on DC  ISS, monitor FS

## 2017-07-10 NOTE — DIETITIAN INITIAL EVALUATION ADULT. - PERTINENT LABORATORY DATA
07-10 Na139 mmol/L Glu 216 mg/dL<H> K+ 4.0 mmol/L Cr  1.16 mg/dL BUN 25 mg/dL<H> Phos n/a   Alb n/a   PAB n/a

## 2017-07-10 NOTE — DIETITIAN INITIAL EVALUATION ADULT. - NS AS NUTRI INTERV ED CONTENT
Priority modifications/Purpose of the nutrition education/Nutrition relationship to health/disease/Recommended modifications

## 2017-07-10 NOTE — DISCHARGE NOTE ADULT - MEDICATION SUMMARY - MEDICATIONS TO TAKE
I will START or STAY ON the medications listed below when I get home from the hospital:    acetaminophen 325 mg oral tablet  -- 2 tab(s) by mouth every 6 hours, As needed, Mild Pain (1 - 3)/Moderate/Severe Pain  -- Indication: For Hip pain, acute, right    aspirin 81 mg oral delayed release tablet  -- 1 tab(s) by mouth once a day  -- Indication: For Atrial fibrillation    dilTIAZem 360 mg/24 hours oral capsule, extended release  -- 1 cap(s) by mouth once a day  -- Indication: For Atrial fibrillation    pioglitazone 30 mg oral tablet  -- 1 tab(s) by mouth once a day  -- Indication: For DM2 (diabetes mellitus, type 2)    Januvia 25 mg oral tablet  -- 1 tab(s) by mouth once a day  -- Indication: For DM2 (diabetes mellitus, type 2)    glipiZIDE 10 mg oral tablet  -- 1 tab(s) by mouth 2 times a day  -- Indication: For DM2 (diabetes mellitus, type 2)    simvastatin 10 mg oral tablet  -- 1 tab(s) by mouth once a day (at bedtime)  -- Indication: For Cholesterol    hydroCHLOROthiazide 25 mg oral tablet  -- 1 tab(s) by mouth every other day  -- Indication: For HTN (hypertension)    docusate sodium 100 mg oral capsule  -- 1 cap(s) by mouth 2 times a day  -- Indication: For Constipation, unspecified constipation type

## 2017-07-10 NOTE — PROGRESS NOTE ADULT - ASSESSMENT
87W with DM, HTN, Afib (dx last week - on dilt, on ASA) p/w r thigh pain. Found to have iliopsoas tear and hematoma.

## 2017-07-10 NOTE — PROGRESS NOTE ADULT - PROBLEM SELECTOR PLAN 1
Continue percocet  MRI shows hematoma with Iliopsoas tear, s/p Ortho eval, outpt f/u with Ortho  H/H stable  Plan to DC to rehab today

## 2017-07-10 NOTE — DIETITIAN INITIAL EVALUATION ADULT. - NS AS NUTRI INTERV MEALS SNACK
Fat - modified diet/Mineral - modified diet/Composition of meals/snacks/Other (specify)/Carbohydrate - modified diet/1. Continue current diet order, which remains appropriate at this time. 2. Monitor weights, labs, BM's, skin integrity, p.o. intake.

## 2017-07-10 NOTE — DIETITIAN INITIAL EVALUATION ADULT. - OTHER INFO
Pt seen for LOS. 86 y/o F with medical history significant of DM, HTN. Reports excellent appetite and PO intake >75%. Patient denies any nausea/vomiting/diarrhea/constipation or difficulty chewing and swallowing. NKFA.  Had questions about diet. Reviewed therapeutic diet recommendations with patient. Discussed importance of adhering to consistent carbohydrate diet with proteins at each meals and snacks. No further questions or concerns voiced at this time. RDN remains available, pt made aware.

## 2017-07-10 NOTE — DISCHARGE NOTE ADULT - PLAN OF CARE
prevent fast heart rate continue present medications  f/u cardiology clinic in 1 week continue adequate hydration continue pain medications as neede continue present medications

## 2017-07-10 NOTE — DISCHARGE NOTE ADULT - HOSPITAL COURSE
87W with DM, HTN, Afib (dx last week - on diltiazem and ASA) p/w Right groin pain. Found to have iliopsoas tear and hematoma. H/H remained stable during admission and Hgb: 11.5 on day of discharge. ARB held due to TRACY on admission with Creatinine up to 1.56. Creatinine back to baseline on discharge at 1.16. Also found to have asymptomatic right medial thigh ecchymoses that has improved since admission. PT recommended rehab and patient stable for DC. 87W with DM, HTN, Afib (dx last week - on diltiazem and ASA) p/w Right groin pain. Found to have iliopsoas tear and hematoma. H/H remained stable during admission and Hgb: 11.5 on day of discharge. ARB held due to TRACY on admission with Creatinine up to 1.56. Creatinine back to baseline on discharge at 1.16. Also found to have asymptomatic right medial thigh ecchymoses that has improved since admission. PT recommended rehab and patient stable for DC to rehab.

## 2017-07-10 NOTE — DISCHARGE NOTE ADULT - CARE PROVIDER_API CALL
All Flores (MD), Cardiovascular Disease; Nuclear Cardiology  [address] [facility] [place] [address]  Phone: [phone]  Fax: [phone]

## 2017-07-10 NOTE — DISCHARGE NOTE ADULT - CARE PROVIDERS DIRECT ADDRESSES
[email or web address]

## 2017-07-10 NOTE — DISCHARGE NOTE ADULT - MEDICATION SUMMARY - MEDICATIONS TO STOP TAKING
I will STOP taking the medications listed below when I get home from the hospital:    irbesartan 150 mg oral tablet  -- 1 tab(s) by mouth once a day I will STOP taking the medications listed below when I get home from the hospital:  None

## 2017-07-10 NOTE — DISCHARGE NOTE ADULT - CARE PLAN
Principal Discharge DX:	Atrial fibrillation with RVR  Goal:	prevent fast heart rate  Instructions for follow-up, activity and diet:	continue present medications  f/u cardiology clinic in 1 week  Secondary Diagnosis:	TRACY (acute kidney injury)  Instructions for follow-up, activity and diet:	continue adequate hydration  Secondary Diagnosis:	Hip pain, acute, right  Instructions for follow-up, activity and diet:	continue pain medications as neede  Secondary Diagnosis:	DM2 (diabetes mellitus, type 2)  Instructions for follow-up, activity and diet:	continue present medications

## 2018-01-01 ENCOUNTER — INPATIENT (INPATIENT)
Facility: HOSPITAL | Age: 83
LOS: 2 days | Discharge: NURSING, REHAB OR LONG-TERM CARE | End: 2018-10-23
Attending: STUDENT IN AN ORGANIZED HEALTH CARE EDUCATION/TRAINING PROGRAM | Admitting: STUDENT IN AN ORGANIZED HEALTH CARE EDUCATION/TRAINING PROGRAM
Payer: MEDICARE

## 2018-01-01 VITALS
DIASTOLIC BLOOD PRESSURE: 77 MMHG | OXYGEN SATURATION: 98 % | HEART RATE: 95 BPM | SYSTOLIC BLOOD PRESSURE: 150 MMHG | TEMPERATURE: 98 F | RESPIRATION RATE: 18 BRPM

## 2018-01-01 VITALS
SYSTOLIC BLOOD PRESSURE: 149 MMHG | DIASTOLIC BLOOD PRESSURE: 70 MMHG | RESPIRATION RATE: 18 BRPM | OXYGEN SATURATION: 98 % | HEART RATE: 92 BPM | TEMPERATURE: 98 F

## 2018-01-01 DIAGNOSIS — N18.3 CHRONIC KIDNEY DISEASE, STAGE 3 (MODERATE): ICD-10-CM

## 2018-01-01 DIAGNOSIS — E11.65 TYPE 2 DIABETES MELLITUS WITH HYPERGLYCEMIA: ICD-10-CM

## 2018-01-01 DIAGNOSIS — I48.91 UNSPECIFIED ATRIAL FIBRILLATION: ICD-10-CM

## 2018-01-01 DIAGNOSIS — R41.3 OTHER AMNESIA: ICD-10-CM

## 2018-01-01 DIAGNOSIS — I10 ESSENTIAL (PRIMARY) HYPERTENSION: ICD-10-CM

## 2018-01-01 DIAGNOSIS — E78.2 MIXED HYPERLIPIDEMIA: ICD-10-CM

## 2018-01-01 DIAGNOSIS — Z96.649 PRESENCE OF UNSPECIFIED ARTIFICIAL HIP JOINT: Chronic | ICD-10-CM

## 2018-01-01 DIAGNOSIS — E78.5 HYPERLIPIDEMIA, UNSPECIFIED: ICD-10-CM

## 2018-01-01 DIAGNOSIS — E11.8 TYPE 2 DIABETES MELLITUS WITH UNSPECIFIED COMPLICATIONS: ICD-10-CM
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
## 2018-01-01 DIAGNOSIS — M12.9 ARTHROPATHY, UNSPECIFIED: ICD-10-CM

## 2018-01-01 LAB
ALBUMIN SERPL ELPH-MCNC: 3.1 G/DL — LOW (ref 3.3–5)
ALBUMIN SERPL ELPH-MCNC: 3.9 G/DL — SIGNIFICANT CHANGE UP (ref 3.3–5)
ALP SERPL-CCNC: 107 U/L — SIGNIFICANT CHANGE UP (ref 40–120)
ALP SERPL-CCNC: 83 U/L — SIGNIFICANT CHANGE UP (ref 40–120)
ALT FLD-CCNC: 10 U/L — SIGNIFICANT CHANGE UP (ref 4–33)
ALT FLD-CCNC: 11 U/L — SIGNIFICANT CHANGE UP (ref 4–33)
AST SERPL-CCNC: 17 U/L — SIGNIFICANT CHANGE UP (ref 4–32)
AST SERPL-CCNC: 18 U/L — SIGNIFICANT CHANGE UP (ref 4–32)
BASOPHILS # BLD AUTO: 0.08 K/UL — SIGNIFICANT CHANGE UP (ref 0–0.2)
BASOPHILS # BLD AUTO: 0.09 K/UL — SIGNIFICANT CHANGE UP (ref 0–0.2)
BASOPHILS NFR BLD AUTO: 0.7 % — SIGNIFICANT CHANGE UP (ref 0–2)
BASOPHILS NFR BLD AUTO: 0.9 % — SIGNIFICANT CHANGE UP (ref 0–2)
BILIRUB SERPL-MCNC: 0.6 MG/DL — SIGNIFICANT CHANGE UP (ref 0.2–1.2)
BILIRUB SERPL-MCNC: 0.9 MG/DL — SIGNIFICANT CHANGE UP (ref 0.2–1.2)
BUN SERPL-MCNC: 23 MG/DL — SIGNIFICANT CHANGE UP (ref 7–23)
BUN SERPL-MCNC: 28 MG/DL — HIGH (ref 7–23)
BUN SERPL-MCNC: 28 MG/DL — HIGH (ref 7–23)
BUN SERPL-MCNC: 33 MG/DL — HIGH (ref 7–23)
CALCIUM SERPL-MCNC: 9.1 MG/DL — SIGNIFICANT CHANGE UP (ref 8.4–10.5)
CALCIUM SERPL-MCNC: 9.3 MG/DL — SIGNIFICANT CHANGE UP (ref 8.4–10.5)
CALCIUM SERPL-MCNC: 9.4 MG/DL — SIGNIFICANT CHANGE UP (ref 8.4–10.5)
CALCIUM SERPL-MCNC: 9.7 MG/DL — SIGNIFICANT CHANGE UP (ref 8.4–10.5)
CHLORIDE SERPL-SCNC: 95 MMOL/L — LOW (ref 98–107)
CHLORIDE SERPL-SCNC: 95 MMOL/L — LOW (ref 98–107)
CHLORIDE SERPL-SCNC: 96 MMOL/L — LOW (ref 98–107)
CHLORIDE SERPL-SCNC: 97 MMOL/L — LOW (ref 98–107)
CHOLEST SERPL-MCNC: 172 MG/DL — SIGNIFICANT CHANGE UP (ref 120–199)
CK SERPL-CCNC: 84 U/L — SIGNIFICANT CHANGE UP (ref 25–170)
CO2 SERPL-SCNC: 24 MMOL/L — SIGNIFICANT CHANGE UP (ref 22–31)
CO2 SERPL-SCNC: 26 MMOL/L — SIGNIFICANT CHANGE UP (ref 22–31)
CO2 SERPL-SCNC: 26 MMOL/L — SIGNIFICANT CHANGE UP (ref 22–31)
CO2 SERPL-SCNC: 27 MMOL/L — SIGNIFICANT CHANGE UP (ref 22–31)
CREAT SERPL-MCNC: 0.99 MG/DL — SIGNIFICANT CHANGE UP (ref 0.5–1.3)
CREAT SERPL-MCNC: 1.05 MG/DL — SIGNIFICANT CHANGE UP (ref 0.5–1.3)
CREAT SERPL-MCNC: 1.11 MG/DL — SIGNIFICANT CHANGE UP (ref 0.5–1.3)
CREAT SERPL-MCNC: 1.33 MG/DL — HIGH (ref 0.5–1.3)
EOSINOPHIL # BLD AUTO: 0.07 K/UL — SIGNIFICANT CHANGE UP (ref 0–0.5)
EOSINOPHIL # BLD AUTO: 0.15 K/UL — SIGNIFICANT CHANGE UP (ref 0–0.5)
EOSINOPHIL NFR BLD AUTO: 0.6 % — SIGNIFICANT CHANGE UP (ref 0–6)
EOSINOPHIL NFR BLD AUTO: 1.5 % — SIGNIFICANT CHANGE UP (ref 0–6)
FOLATE SERPL-MCNC: > 20 NG/ML — HIGH (ref 4.7–20)
GLUCOSE SERPL-MCNC: 244 MG/DL — HIGH (ref 70–99)
GLUCOSE SERPL-MCNC: 245 MG/DL — HIGH (ref 70–99)
GLUCOSE SERPL-MCNC: 303 MG/DL — HIGH (ref 70–99)
GLUCOSE SERPL-MCNC: 332 MG/DL — HIGH (ref 70–99)
HBA1C BLD-MCNC: 10.8 % — HIGH (ref 4–5.6)
HCT VFR BLD CALC: 40.9 % — SIGNIFICANT CHANGE UP (ref 34.5–45)
HCT VFR BLD CALC: 42 % — SIGNIFICANT CHANGE UP (ref 34.5–45)
HCT VFR BLD CALC: 42 % — SIGNIFICANT CHANGE UP (ref 34.5–45)
HCT VFR BLD CALC: 43.1 % — SIGNIFICANT CHANGE UP (ref 34.5–45)
HDLC SERPL-MCNC: 54 MG/DL — SIGNIFICANT CHANGE UP (ref 45–65)
HGB BLD-MCNC: 13.2 G/DL — SIGNIFICANT CHANGE UP (ref 11.5–15.5)
HGB BLD-MCNC: 13.4 G/DL — SIGNIFICANT CHANGE UP (ref 11.5–15.5)
HGB BLD-MCNC: 13.4 G/DL — SIGNIFICANT CHANGE UP (ref 11.5–15.5)
HGB BLD-MCNC: 13.5 G/DL — SIGNIFICANT CHANGE UP (ref 11.5–15.5)
IMM GRANULOCYTES # BLD AUTO: 0.07 # — SIGNIFICANT CHANGE UP
IMM GRANULOCYTES # BLD AUTO: 0.07 # — SIGNIFICANT CHANGE UP
IMM GRANULOCYTES NFR BLD AUTO: 0.6 % — SIGNIFICANT CHANGE UP (ref 0–1.5)
IMM GRANULOCYTES NFR BLD AUTO: 0.7 % — SIGNIFICANT CHANGE UP (ref 0–1.5)
LIPID PNL WITH DIRECT LDL SERPL: 104 MG/DL — SIGNIFICANT CHANGE UP
LYMPHOCYTES # BLD AUTO: 1.54 K/UL — SIGNIFICANT CHANGE UP (ref 1–3.3)
LYMPHOCYTES # BLD AUTO: 1.62 K/UL — SIGNIFICANT CHANGE UP (ref 1–3.3)
LYMPHOCYTES # BLD AUTO: 13.8 % — SIGNIFICANT CHANGE UP (ref 13–44)
LYMPHOCYTES # BLD AUTO: 15.8 % — SIGNIFICANT CHANGE UP (ref 13–44)
MAGNESIUM SERPL-MCNC: 1.7 MG/DL — SIGNIFICANT CHANGE UP (ref 1.6–2.6)
MAGNESIUM SERPL-MCNC: 1.8 MG/DL — SIGNIFICANT CHANGE UP (ref 1.6–2.6)
MCHC RBC-ENTMCNC: 27.8 PG — SIGNIFICANT CHANGE UP (ref 27–34)
MCHC RBC-ENTMCNC: 28.1 PG — SIGNIFICANT CHANGE UP (ref 27–34)
MCHC RBC-ENTMCNC: 28.2 PG — SIGNIFICANT CHANGE UP (ref 27–34)
MCHC RBC-ENTMCNC: 28.3 PG — SIGNIFICANT CHANGE UP (ref 27–34)
MCHC RBC-ENTMCNC: 31.3 % — LOW (ref 32–36)
MCHC RBC-ENTMCNC: 31.9 % — LOW (ref 32–36)
MCHC RBC-ENTMCNC: 31.9 % — LOW (ref 32–36)
MCHC RBC-ENTMCNC: 32.3 % — SIGNIFICANT CHANGE UP (ref 32–36)
MCV RBC AUTO: 87 FL — SIGNIFICANT CHANGE UP (ref 80–100)
MCV RBC AUTO: 88.2 FL — SIGNIFICANT CHANGE UP (ref 80–100)
MCV RBC AUTO: 88.6 FL — SIGNIFICANT CHANGE UP (ref 80–100)
MCV RBC AUTO: 88.7 FL — SIGNIFICANT CHANGE UP (ref 80–100)
MONOCYTES # BLD AUTO: 1.01 K/UL — HIGH (ref 0–0.9)
MONOCYTES # BLD AUTO: 1.19 K/UL — HIGH (ref 0–0.9)
MONOCYTES NFR BLD AUTO: 10.7 % — SIGNIFICANT CHANGE UP (ref 2–14)
MONOCYTES NFR BLD AUTO: 9.9 % — SIGNIFICANT CHANGE UP (ref 2–14)
NEUTROPHILS # BLD AUTO: 7.29 K/UL — SIGNIFICANT CHANGE UP (ref 1.8–7.4)
NEUTROPHILS # BLD AUTO: 8.21 K/UL — HIGH (ref 1.8–7.4)
NEUTROPHILS NFR BLD AUTO: 71.2 % — SIGNIFICANT CHANGE UP (ref 43–77)
NEUTROPHILS NFR BLD AUTO: 73.6 % — SIGNIFICANT CHANGE UP (ref 43–77)
NRBC # FLD: 0 — SIGNIFICANT CHANGE UP
PHOSPHATE SERPL-MCNC: 3.2 MG/DL — SIGNIFICANT CHANGE UP (ref 2.5–4.5)
PHOSPHATE SERPL-MCNC: 3.3 MG/DL — SIGNIFICANT CHANGE UP (ref 2.5–4.5)
PLATELET # BLD AUTO: 218 K/UL — SIGNIFICANT CHANGE UP (ref 150–400)
PLATELET # BLD AUTO: 223 K/UL — SIGNIFICANT CHANGE UP (ref 150–400)
PLATELET # BLD AUTO: 233 K/UL — SIGNIFICANT CHANGE UP (ref 150–400)
PLATELET # BLD AUTO: 245 K/UL — SIGNIFICANT CHANGE UP (ref 150–400)
PMV BLD: 12 FL — SIGNIFICANT CHANGE UP (ref 7–13)
PMV BLD: 12.3 FL — SIGNIFICANT CHANGE UP (ref 7–13)
PMV BLD: 12.4 FL — SIGNIFICANT CHANGE UP (ref 7–13)
PMV BLD: 12.6 FL — SIGNIFICANT CHANGE UP (ref 7–13)
POTASSIUM SERPL-MCNC: 3.8 MMOL/L — SIGNIFICANT CHANGE UP (ref 3.5–5.3)
POTASSIUM SERPL-MCNC: 4 MMOL/L — SIGNIFICANT CHANGE UP (ref 3.5–5.3)
POTASSIUM SERPL-MCNC: 4.1 MMOL/L — SIGNIFICANT CHANGE UP (ref 3.5–5.3)
POTASSIUM SERPL-MCNC: 4.5 MMOL/L — SIGNIFICANT CHANGE UP (ref 3.5–5.3)
POTASSIUM SERPL-SCNC: 3.8 MMOL/L — SIGNIFICANT CHANGE UP (ref 3.5–5.3)
POTASSIUM SERPL-SCNC: 4 MMOL/L — SIGNIFICANT CHANGE UP (ref 3.5–5.3)
POTASSIUM SERPL-SCNC: 4.1 MMOL/L — SIGNIFICANT CHANGE UP (ref 3.5–5.3)
POTASSIUM SERPL-SCNC: 4.5 MMOL/L — SIGNIFICANT CHANGE UP (ref 3.5–5.3)
PROT SERPL-MCNC: 6.8 G/DL — SIGNIFICANT CHANGE UP (ref 6–8.3)
PROT SERPL-MCNC: 7.5 G/DL — SIGNIFICANT CHANGE UP (ref 6–8.3)
RBC # BLD: 4.7 M/UL — SIGNIFICANT CHANGE UP (ref 3.8–5.2)
RBC # BLD: 4.74 M/UL — SIGNIFICANT CHANGE UP (ref 3.8–5.2)
RBC # BLD: 4.76 M/UL — SIGNIFICANT CHANGE UP (ref 3.8–5.2)
RBC # BLD: 4.86 M/UL — SIGNIFICANT CHANGE UP (ref 3.8–5.2)
RBC # FLD: 14 % — SIGNIFICANT CHANGE UP (ref 10.3–14.5)
RBC # FLD: 14.2 % — SIGNIFICANT CHANGE UP (ref 10.3–14.5)
RBC # FLD: 14.3 % — SIGNIFICANT CHANGE UP (ref 10.3–14.5)
RBC # FLD: 14.5 % — SIGNIFICANT CHANGE UP (ref 10.3–14.5)
SODIUM SERPL-SCNC: 135 MMOL/L — SIGNIFICANT CHANGE UP (ref 135–145)
SODIUM SERPL-SCNC: 136 MMOL/L — SIGNIFICANT CHANGE UP (ref 135–145)
SODIUM SERPL-SCNC: 136 MMOL/L — SIGNIFICANT CHANGE UP (ref 135–145)
SODIUM SERPL-SCNC: 137 MMOL/L — SIGNIFICANT CHANGE UP (ref 135–145)
T PALLIDUM AB TITR SER: NEGATIVE — SIGNIFICANT CHANGE UP
TRIGL SERPL-MCNC: 90 MG/DL — SIGNIFICANT CHANGE UP (ref 10–149)
TSH SERPL-MCNC: 0.61 UIU/ML — SIGNIFICANT CHANGE UP (ref 0.27–4.2)
VIT B12 SERPL-MCNC: 447 PG/ML — SIGNIFICANT CHANGE UP (ref 200–900)
WBC # BLD: 10.23 K/UL — SIGNIFICANT CHANGE UP (ref 3.8–10.5)
WBC # BLD: 11.16 K/UL — HIGH (ref 3.8–10.5)
WBC # BLD: 9.42 K/UL — SIGNIFICANT CHANGE UP (ref 3.8–10.5)
WBC # BLD: 9.75 K/UL — SIGNIFICANT CHANGE UP (ref 3.8–10.5)
WBC # FLD AUTO: 10.23 K/UL — SIGNIFICANT CHANGE UP (ref 3.8–10.5)
WBC # FLD AUTO: 11.16 K/UL — HIGH (ref 3.8–10.5)
WBC # FLD AUTO: 9.42 K/UL — SIGNIFICANT CHANGE UP (ref 3.8–10.5)
WBC # FLD AUTO: 9.75 K/UL — SIGNIFICANT CHANGE UP (ref 3.8–10.5)

## 2018-01-01 PROCEDURE — 73110 X-RAY EXAM OF WRIST: CPT | Mod: 26,LT

## 2018-01-01 PROCEDURE — 99223 1ST HOSP IP/OBS HIGH 75: CPT

## 2018-01-01 PROCEDURE — 70450 CT HEAD/BRAIN W/O DYE: CPT | Mod: 26

## 2018-01-01 PROCEDURE — 73502 X-RAY EXAM HIP UNI 2-3 VIEWS: CPT | Mod: 26,LT

## 2018-01-01 PROCEDURE — 73080 X-RAY EXAM OF ELBOW: CPT | Mod: 26,LT

## 2018-01-01 PROCEDURE — 73564 X-RAY EXAM KNEE 4 OR MORE: CPT | Mod: 26,LT

## 2018-01-01 PROCEDURE — 71045 X-RAY EXAM CHEST 1 VIEW: CPT | Mod: 26

## 2018-01-01 RX ORDER — DEXTROSE 50 % IN WATER 50 %
25 SYRINGE (ML) INTRAVENOUS ONCE
Qty: 0 | Refills: 0 | Status: DISCONTINUED | OUTPATIENT
Start: 2018-01-01 | End: 2018-01-01

## 2018-01-01 RX ORDER — INSULIN LISPRO 100/ML
8 VIAL (ML) SUBCUTANEOUS
Qty: 0 | Refills: 0 | Status: DISCONTINUED | OUTPATIENT
Start: 2018-01-01 | End: 2018-01-01

## 2018-01-01 RX ORDER — PANTOPRAZOLE SODIUM 20 MG/1
40 TABLET, DELAYED RELEASE ORAL
Qty: 0 | Refills: 0 | Status: DISCONTINUED | OUTPATIENT
Start: 2018-01-01 | End: 2018-01-01

## 2018-01-01 RX ORDER — KETOROLAC TROMETHAMINE 30 MG/ML
15 SYRINGE (ML) INJECTION EVERY 6 HOURS
Qty: 0 | Refills: 0 | Status: DISCONTINUED | OUTPATIENT
Start: 2018-01-01 | End: 2018-01-01

## 2018-01-01 RX ORDER — INSULIN LISPRO 100/ML
6 VIAL (ML) SUBCUTANEOUS
Qty: 0 | Refills: 0 | Status: DISCONTINUED | OUTPATIENT
Start: 2018-01-01 | End: 2018-01-01

## 2018-01-01 RX ORDER — INSULIN GLARGINE 100 [IU]/ML
22 INJECTION, SOLUTION SUBCUTANEOUS AT BEDTIME
Qty: 0 | Refills: 0 | Status: DISCONTINUED | OUTPATIENT
Start: 2018-01-01 | End: 2018-01-01

## 2018-01-01 RX ORDER — INSULIN GLARGINE 100 [IU]/ML
26 INJECTION, SOLUTION SUBCUTANEOUS
Qty: 0 | Refills: 0 | COMMUNITY
Start: 2018-01-01

## 2018-01-01 RX ORDER — GLUCAGON INJECTION, SOLUTION 0.5 MG/.1ML
1 INJECTION, SOLUTION SUBCUTANEOUS ONCE
Qty: 0 | Refills: 0 | Status: DISCONTINUED | OUTPATIENT
Start: 2018-01-01 | End: 2018-01-01

## 2018-01-01 RX ORDER — DEXTROSE 50 % IN WATER 50 %
15 SYRINGE (ML) INTRAVENOUS ONCE
Qty: 0 | Refills: 0 | Status: DISCONTINUED | OUTPATIENT
Start: 2018-01-01 | End: 2018-01-01

## 2018-01-01 RX ORDER — ACETAMINOPHEN 500 MG
650 TABLET ORAL EVERY 6 HOURS
Qty: 0 | Refills: 0 | Status: DISCONTINUED | OUTPATIENT
Start: 2018-01-01 | End: 2018-01-01

## 2018-01-01 RX ORDER — ACETAMINOPHEN 500 MG
2 TABLET ORAL
Qty: 0 | Refills: 0 | DISCHARGE
Start: 2018-01-01

## 2018-01-01 RX ORDER — PIOGLITAZONE HYDROCHLORIDE 15 MG/1
1 TABLET ORAL
Qty: 0 | Refills: 0 | COMMUNITY

## 2018-01-01 RX ORDER — ACETAMINOPHEN 500 MG
2 TABLET ORAL
Qty: 0 | Refills: 0 | COMMUNITY
Start: 2018-01-01

## 2018-01-01 RX ORDER — IBUPROFEN 200 MG
400 TABLET ORAL ONCE
Qty: 0 | Refills: 0 | Status: COMPLETED | OUTPATIENT
Start: 2018-01-01 | End: 2018-01-01

## 2018-01-01 RX ORDER — SODIUM CHLORIDE 9 MG/ML
500 INJECTION INTRAMUSCULAR; INTRAVENOUS; SUBCUTANEOUS
Qty: 0 | Refills: 0 | Status: DISCONTINUED | OUTPATIENT
Start: 2018-01-01 | End: 2018-01-01

## 2018-01-01 RX ORDER — SODIUM CHLORIDE 9 MG/ML
1000 INJECTION, SOLUTION INTRAVENOUS
Qty: 0 | Refills: 0 | Status: DISCONTINUED | OUTPATIENT
Start: 2018-01-01 | End: 2018-01-01

## 2018-01-01 RX ORDER — DEXTROSE 50 % IN WATER 50 %
12.5 SYRINGE (ML) INTRAVENOUS ONCE
Qty: 0 | Refills: 0 | Status: DISCONTINUED | OUTPATIENT
Start: 2018-01-01 | End: 2018-01-01

## 2018-01-01 RX ORDER — INSULIN LISPRO 100/ML
VIAL (ML) SUBCUTANEOUS AT BEDTIME
Qty: 0 | Refills: 0 | Status: DISCONTINUED | OUTPATIENT
Start: 2018-01-01 | End: 2018-01-01

## 2018-01-01 RX ORDER — INSULIN LISPRO 100/ML
VIAL (ML) SUBCUTANEOUS
Qty: 0 | Refills: 0 | Status: DISCONTINUED | OUTPATIENT
Start: 2018-01-01 | End: 2018-01-01

## 2018-01-01 RX ORDER — LANOLIN ALCOHOL/MO/W.PET/CERES
3 CREAM (GRAM) TOPICAL ONCE
Qty: 0 | Refills: 0 | Status: COMPLETED | OUTPATIENT
Start: 2018-01-01 | End: 2018-01-01

## 2018-01-01 RX ORDER — INSULIN LISPRO 100/ML
10 VIAL (ML) SUBCUTANEOUS
Qty: 0 | Refills: 0 | Status: DISCONTINUED | OUTPATIENT
Start: 2018-01-01 | End: 2018-01-01

## 2018-01-01 RX ORDER — DILTIAZEM HCL 120 MG
240 CAPSULE, EXT RELEASE 24 HR ORAL DAILY
Qty: 0 | Refills: 0 | Status: DISCONTINUED | OUTPATIENT
Start: 2018-01-01 | End: 2018-01-01

## 2018-01-01 RX ORDER — PIOGLITAZONE HYDROCHLORIDE 15 MG/1
1 TABLET ORAL
Qty: 0 | Refills: 0 | COMMUNITY
Start: 2018-01-01

## 2018-01-01 RX ORDER — ONDANSETRON 8 MG/1
4 TABLET, FILM COATED ORAL EVERY 6 HOURS
Qty: 0 | Refills: 0 | Status: DISCONTINUED | OUTPATIENT
Start: 2018-01-01 | End: 2018-01-01

## 2018-01-01 RX ORDER — PANTOPRAZOLE SODIUM 20 MG/1
1 TABLET, DELAYED RELEASE ORAL
Qty: 0 | Refills: 0 | DISCHARGE
Start: 2018-01-01

## 2018-01-01 RX ORDER — SITAGLIPTIN 50 MG/1
1 TABLET, FILM COATED ORAL
Qty: 0 | Refills: 0 | COMMUNITY

## 2018-01-01 RX ORDER — HYDROXYZINE HCL 10 MG
1 TABLET ORAL
Qty: 0 | Refills: 0 | COMMUNITY

## 2018-01-01 RX ORDER — MORPHINE SULFATE 50 MG/1
4 CAPSULE, EXTENDED RELEASE ORAL ONCE
Qty: 0 | Refills: 0 | Status: DISCONTINUED | OUTPATIENT
Start: 2018-01-01 | End: 2018-01-01

## 2018-01-01 RX ORDER — METOPROLOL TARTRATE 50 MG
25 TABLET ORAL DAILY
Qty: 0 | Refills: 0 | Status: DISCONTINUED | OUTPATIENT
Start: 2018-01-01 | End: 2018-01-01

## 2018-01-01 RX ORDER — INSULIN GLARGINE 100 [IU]/ML
16 INJECTION, SOLUTION SUBCUTANEOUS AT BEDTIME
Qty: 0 | Refills: 0 | Status: DISCONTINUED | OUTPATIENT
Start: 2018-01-01 | End: 2018-01-01

## 2018-01-01 RX ORDER — RIVAROXABAN 15 MG-20MG
15 KIT ORAL EVERY 24 HOURS
Qty: 0 | Refills: 0 | Status: DISCONTINUED | OUTPATIENT
Start: 2018-01-01 | End: 2018-01-01

## 2018-01-01 RX ORDER — MORPHINE SULFATE 50 MG/1
2 CAPSULE, EXTENDED RELEASE ORAL EVERY 4 HOURS
Qty: 0 | Refills: 0 | Status: DISCONTINUED | OUTPATIENT
Start: 2018-01-01 | End: 2018-01-01

## 2018-01-01 RX ORDER — INSULIN GLARGINE 100 [IU]/ML
26 INJECTION, SOLUTION SUBCUTANEOUS AT BEDTIME
Qty: 0 | Refills: 0 | Status: DISCONTINUED | OUTPATIENT
Start: 2018-01-01 | End: 2018-01-01

## 2018-01-01 RX ADMIN — Medication 20 MILLIGRAM(S): at 14:00

## 2018-01-01 RX ADMIN — Medication 20 MILLIGRAM(S): at 18:33

## 2018-01-01 RX ADMIN — PANTOPRAZOLE SODIUM 40 MILLIGRAM(S): 20 TABLET, DELAYED RELEASE ORAL at 06:21

## 2018-01-01 RX ADMIN — Medication 1 APPLICATION(S): at 17:47

## 2018-01-01 RX ADMIN — Medication 4: at 09:46

## 2018-01-01 RX ADMIN — Medication 400 MILLIGRAM(S): at 03:57

## 2018-01-01 RX ADMIN — MORPHINE SULFATE 4 MILLIGRAM(S): 50 CAPSULE, EXTENDED RELEASE ORAL at 06:05

## 2018-01-01 RX ADMIN — Medication 25 MILLIGRAM(S): at 05:06

## 2018-01-01 RX ADMIN — PANTOPRAZOLE SODIUM 40 MILLIGRAM(S): 20 TABLET, DELAYED RELEASE ORAL at 05:55

## 2018-01-01 RX ADMIN — Medication 1 APPLICATION(S): at 19:30

## 2018-01-01 RX ADMIN — Medication 240 MILLIGRAM(S): at 05:06

## 2018-01-01 RX ADMIN — Medication 25 MILLIGRAM(S): at 18:28

## 2018-01-01 RX ADMIN — Medication 25 MILLIGRAM(S): at 06:21

## 2018-01-01 RX ADMIN — Medication 240 MILLIGRAM(S): at 06:21

## 2018-01-01 RX ADMIN — Medication 2: at 17:46

## 2018-01-01 RX ADMIN — MORPHINE SULFATE 2 MILLIGRAM(S): 50 CAPSULE, EXTENDED RELEASE ORAL at 09:15

## 2018-01-01 RX ADMIN — RIVAROXABAN 15 MILLIGRAM(S): KIT at 19:29

## 2018-01-01 RX ADMIN — Medication 240 MILLIGRAM(S): at 05:54

## 2018-01-01 RX ADMIN — Medication 1: at 22:36

## 2018-01-01 RX ADMIN — PANTOPRAZOLE SODIUM 40 MILLIGRAM(S): 20 TABLET, DELAYED RELEASE ORAL at 18:29

## 2018-01-01 RX ADMIN — Medication 20 MILLIGRAM(S): at 17:04

## 2018-01-01 RX ADMIN — Medication 20 MILLIGRAM(S): at 13:04

## 2018-01-01 RX ADMIN — Medication 15 MILLIGRAM(S): at 00:06

## 2018-01-01 RX ADMIN — Medication 6 UNIT(S): at 09:08

## 2018-01-01 RX ADMIN — Medication 1 APPLICATION(S): at 05:55

## 2018-01-01 RX ADMIN — Medication 15 MILLIGRAM(S): at 07:00

## 2018-01-01 RX ADMIN — MORPHINE SULFATE 4 MILLIGRAM(S): 50 CAPSULE, EXTENDED RELEASE ORAL at 05:51

## 2018-01-01 RX ADMIN — RIVAROXABAN 15 MILLIGRAM(S): KIT at 17:04

## 2018-01-01 RX ADMIN — Medication 2: at 13:04

## 2018-01-01 RX ADMIN — Medication 15 MILLIGRAM(S): at 13:14

## 2018-01-01 RX ADMIN — Medication 15 MILLIGRAM(S): at 06:22

## 2018-01-01 RX ADMIN — RIVAROXABAN 15 MILLIGRAM(S): KIT at 17:46

## 2018-01-01 RX ADMIN — SODIUM CHLORIDE 100 MILLILITER(S): 9 INJECTION INTRAMUSCULAR; INTRAVENOUS; SUBCUTANEOUS at 10:30

## 2018-01-01 RX ADMIN — INSULIN GLARGINE 16 UNIT(S): 100 INJECTION, SOLUTION SUBCUTANEOUS at 22:19

## 2018-01-01 RX ADMIN — Medication 2: at 09:08

## 2018-01-01 RX ADMIN — Medication 15 MILLIGRAM(S): at 00:32

## 2018-01-01 RX ADMIN — Medication 20 MILLIGRAM(S): at 12:14

## 2018-01-01 RX ADMIN — PANTOPRAZOLE SODIUM 40 MILLIGRAM(S): 20 TABLET, DELAYED RELEASE ORAL at 05:07

## 2018-01-01 RX ADMIN — Medication 10 UNIT(S): at 13:02

## 2018-01-01 RX ADMIN — Medication 8 UNIT(S): at 08:51

## 2018-01-01 RX ADMIN — MORPHINE SULFATE 2 MILLIGRAM(S): 50 CAPSULE, EXTENDED RELEASE ORAL at 08:52

## 2018-01-01 RX ADMIN — Medication 3: at 18:21

## 2018-01-01 RX ADMIN — Medication 3: at 13:00

## 2018-01-01 RX ADMIN — Medication 400 MILLIGRAM(S): at 04:50

## 2018-01-01 RX ADMIN — Medication 1 APPLICATION(S): at 05:07

## 2018-01-01 RX ADMIN — Medication 3 MILLIGRAM(S): at 00:07

## 2018-01-01 RX ADMIN — Medication 1 APPLICATION(S): at 06:21

## 2018-01-01 RX ADMIN — Medication 3: at 09:59

## 2018-01-01 RX ADMIN — Medication 6 UNIT(S): at 12:14

## 2018-01-01 RX ADMIN — Medication 2: at 12:14

## 2018-01-01 RX ADMIN — Medication 240 MILLIGRAM(S): at 18:28

## 2018-01-01 RX ADMIN — Medication 3: at 08:51

## 2018-01-01 RX ADMIN — Medication 25 MILLIGRAM(S): at 05:54

## 2018-01-01 RX ADMIN — Medication 8 UNIT(S): at 17:43

## 2018-01-01 RX ADMIN — Medication 1 APPLICATION(S): at 17:05

## 2018-01-01 RX ADMIN — Medication 6 UNIT(S): at 13:02

## 2018-01-01 RX ADMIN — RIVAROXABAN 15 MILLIGRAM(S): KIT at 15:54

## 2018-01-01 RX ADMIN — INSULIN GLARGINE 22 UNIT(S): 100 INJECTION, SOLUTION SUBCUTANEOUS at 22:36

## 2018-01-09 ENCOUNTER — EMERGENCY (EMERGENCY)
Facility: HOSPITAL | Age: 83
LOS: 1 days | Discharge: HOME | End: 2018-01-09
Attending: EMERGENCY MEDICINE | Admitting: EMERGENCY MEDICINE
Payer: MEDICARE

## 2018-01-09 VITALS
DIASTOLIC BLOOD PRESSURE: 78 MMHG | RESPIRATION RATE: 18 BRPM | HEART RATE: 124 BPM | SYSTOLIC BLOOD PRESSURE: 148 MMHG | TEMPERATURE: 98 F

## 2018-01-09 DIAGNOSIS — Z96.649 PRESENCE OF UNSPECIFIED ARTIFICIAL HIP JOINT: Chronic | ICD-10-CM

## 2018-01-09 PROCEDURE — 71046 X-RAY EXAM CHEST 2 VIEWS: CPT | Mod: 26

## 2018-01-09 PROCEDURE — 99284 EMERGENCY DEPT VISIT MOD MDM: CPT

## 2018-01-09 NOTE — ED ADULT TRIAGE NOTE - CHIEF COMPLAINT QUOTE
Pt arrives via EMS--pt went to [facility] for cold symptoms--was noted to be in A-fib pt transferred to ER--pt states she is always in A-fib

## 2018-01-09 NOTE — ED PROVIDER NOTE - OBJECTIVE STATEMENT
87f, pmhx afib (no a/c), htn, dm. went to [facility] today, did not see the MD but was sent to the ED due to fast afib to 130. pt with known afib, dx in 09/2017. no bleeding risks, unclear why not on a/c. she refused holter at the time, was put on cardizem  mg. she went to the clinic today due to 4 days of rhinorrhea and cough. non-productive. no body aches, h/a, f/c. +nausea but no vomiting, no rash, joint pain, h/a, focal weakness/numbness/parasthesias, change in vision, speech or gait or change. no change in stools. no c/p or palpitations. does note chronic soboe.

## 2018-01-09 NOTE — ED PROVIDER NOTE - PMH
Arthritis involving multiple sites    DM2 (diabetes mellitus, type 2)    HLD (hyperlipidemia)    HTN (hypertension) Arthritis involving multiple sites    Atrial fibrillation, unspecified type    DM2 (diabetes mellitus, type 2)    HLD (hyperlipidemia)    HTN (hypertension)

## 2018-01-09 NOTE — ED PROVIDER NOTE - PROGRESS NOTE DETAILS
Attending Lorenzo YEH: I was asked to briefly evaluate this patient in the ambulance bay. Patient is an 86 yo F with history of DM, HTN, hyperlipidemia, a-fib only on aspirin (no AC) here for cough and fever x 5 days. Denies chest pain, dizziness. Patient went to urgent care, found to have afib with HR of 130 and sent to the emergency department. Patient reports intermittent feeling of sob. Patient well appearing in NAD. exam: irregularly irregular, lungs CTA, abd soft, nt, nd. a/p: cough/ fever, afib - patient needs comprehensive evaluation. cxr normal. pt discharged AMA. I discussed with her daughter Leigh who tried to urge patient to stay but could not. I advised Leigh to make sure she follows up with her cardiologist. pt w/o PMD to contact. gave cardiology list and rter instructions to pt and daughter.  The patient was given verbal and written discharge instructions. Specifically, instructions when to return to the ED and when to seek follow-up from their pcp was discussed. Any specialty follow-up was discussed, including how to make an appointment.  Instructions were discussed in simple, plain language and was understood by the patient. The patient understands that should their symptoms worsen or any new symptoms arise, they should return to the ED immediately for further evaluation. The patient was informed that they currently had a medical condition of: afib with RVR and I am concerned that they have uncontrolled afib and risk of stroke or other serious pathology, even despite being asymptomatic. My proposed course of evaluation and treatment is: admission for rate control, intiiaiting a/c.  Benefits would include: rate control to improve SOB and prevent cardiac collapse and a/c to prevent CVA , which if identified early would lead to appropriate intervention in a timely manner lessing the burden of disability and death Risks of leaving before this had been completed include: misdiagnosis, worsening illness leading up to and including prolonged or permanent disability or death. Specific risks pertinent, but not all inclusive, of their current medical condition include but are not limited to: cva, cardiovascular collapse, death. I also discussed alternatives including: CDU Despite this they stated they wanted to leave due to not wanting to stay in hospital and refused further evaluation, treatment, or admission at this time. They appear clinically sober, to be mentating appropriately, free from distracting injury, have controlled pain, appear to have intact insight, judgement, and reason and in my opinion have the capacity to make this decision. Specifically, they were able to verbally state back in a coherent manner their current medical condition/current diagnosis, the proposes course of treatment, and the risks, benefits, and alternatives of treatment versus leaving against medical advice. They understand that they may return to seek medical attention here at whatever time they want. I highly advised them to return to the ED immediately if they experienced any: c/p, worsening sob, palpitations, reconsidered treatment a/o admission, or had any other concerns. This would be without any repercussions. I recommended they follow-up with cardiologist within 24 hours for further evaluation and treatment.

## 2018-01-09 NOTE — ED PROVIDER NOTE - MEDICAL DECISION MAKING DETAILS
pt with uri, will r/o pna with cxr. also in fast afib and per pt has often been in the 130s. pt is refusing admission for rate control and initiating a/c. given this, no need for blood work. will get cxr.

## 2018-01-15 ENCOUNTER — EMERGENCY (EMERGENCY)
Facility: HOSPITAL | Age: 83
LOS: 1 days | Discharge: HOME | End: 2018-01-15
Attending: EMERGENCY MEDICINE | Admitting: EMERGENCY MEDICINE
Payer: MEDICARE

## 2018-01-15 VITALS
DIASTOLIC BLOOD PRESSURE: 76 MMHG | RESPIRATION RATE: 16 BRPM | SYSTOLIC BLOOD PRESSURE: 163 MMHG | OXYGEN SATURATION: 99 % | HEART RATE: 90 BPM

## 2018-01-15 VITALS
HEART RATE: 74 BPM | RESPIRATION RATE: 18 BRPM | DIASTOLIC BLOOD PRESSURE: 101 MMHG | TEMPERATURE: 98 F | OXYGEN SATURATION: 97 % | SYSTOLIC BLOOD PRESSURE: 166 MMHG

## 2018-01-15 DIAGNOSIS — F43.22 ADJUSTMENT DISORDER WITH ANXIETY: ICD-10-CM

## 2018-01-15 DIAGNOSIS — Z96.649 PRESENCE OF UNSPECIFIED ARTIFICIAL HIP JOINT: Chronic | ICD-10-CM

## 2018-01-15 LAB
ALBUMIN SERPL ELPH-MCNC: 4.2 G/DL — SIGNIFICANT CHANGE UP (ref 3.3–5)
ALP SERPL-CCNC: 99 U/L — SIGNIFICANT CHANGE UP (ref 40–120)
ALT FLD-CCNC: 15 U/L — SIGNIFICANT CHANGE UP (ref 4–33)
APTT BLD: 28.3 SEC — SIGNIFICANT CHANGE UP (ref 27.5–37.4)
AST SERPL-CCNC: 26 U/L — SIGNIFICANT CHANGE UP (ref 4–32)
BASE EXCESS BLDV CALC-SCNC: 8 MMOL/L — SIGNIFICANT CHANGE UP
BASOPHILS # BLD AUTO: 0.06 K/UL — SIGNIFICANT CHANGE UP (ref 0–0.2)
BASOPHILS NFR BLD AUTO: 1 % — SIGNIFICANT CHANGE UP (ref 0–2)
BILIRUB SERPL-MCNC: 0.6 MG/DL — SIGNIFICANT CHANGE UP (ref 0.2–1.2)
BLOOD GAS VENOUS - CREATININE: 1.37 MG/DL — HIGH (ref 0.5–1.3)
BUN SERPL-MCNC: 21 MG/DL — SIGNIFICANT CHANGE UP (ref 7–23)
CALCIUM SERPL-MCNC: 9.6 MG/DL — SIGNIFICANT CHANGE UP (ref 8.4–10.5)
CHLORIDE BLDV-SCNC: 99 MMOL/L — SIGNIFICANT CHANGE UP (ref 96–108)
CHLORIDE SERPL-SCNC: 96 MMOL/L — LOW (ref 98–107)
CK MB BLD-MCNC: 3.56 NG/ML — SIGNIFICANT CHANGE UP (ref 1–4.7)
CK SERPL-CCNC: 102 U/L — SIGNIFICANT CHANGE UP (ref 25–170)
CO2 SERPL-SCNC: 29 MMOL/L — SIGNIFICANT CHANGE UP (ref 22–31)
CREAT SERPL-MCNC: 1.4 MG/DL — HIGH (ref 0.5–1.3)
EOSINOPHIL # BLD AUTO: 0.08 K/UL — SIGNIFICANT CHANGE UP (ref 0–0.5)
EOSINOPHIL NFR BLD AUTO: 1.3 % — SIGNIFICANT CHANGE UP (ref 0–6)
GAS PNL BLDV: 138 MMOL/L — SIGNIFICANT CHANGE UP (ref 136–146)
GLUCOSE BLDV-MCNC: 226 — HIGH (ref 70–99)
GLUCOSE SERPL-MCNC: 210 MG/DL — HIGH (ref 70–99)
HCO3 BLDV-SCNC: 29 MMOL/L — HIGH (ref 20–27)
HCT VFR BLD CALC: 50.9 % — HIGH (ref 34.5–45)
HCT VFR BLDV CALC: 51.2 % — HIGH (ref 34.5–45)
HGB BLD-MCNC: 15.9 G/DL — HIGH (ref 11.5–15.5)
HGB BLDV-MCNC: 16.7 G/DL — HIGH (ref 11.5–15.5)
IMM GRANULOCYTES # BLD AUTO: 0.06 # — SIGNIFICANT CHANGE UP
IMM GRANULOCYTES NFR BLD AUTO: 1 % — SIGNIFICANT CHANGE UP (ref 0–1.5)
INR BLD: 0.91 — SIGNIFICANT CHANGE UP (ref 0.88–1.17)
LACTATE BLDV-MCNC: 1.6 MMOL/L — SIGNIFICANT CHANGE UP (ref 0.5–2)
LYMPHOCYTES # BLD AUTO: 1.31 K/UL — SIGNIFICANT CHANGE UP (ref 1–3.3)
LYMPHOCYTES # BLD AUTO: 21 % — SIGNIFICANT CHANGE UP (ref 13–44)
MCHC RBC-ENTMCNC: 27.2 PG — SIGNIFICANT CHANGE UP (ref 27–34)
MCHC RBC-ENTMCNC: 31.2 % — LOW (ref 32–36)
MCV RBC AUTO: 87 FL — SIGNIFICANT CHANGE UP (ref 80–100)
MONOCYTES # BLD AUTO: 0.54 K/UL — SIGNIFICANT CHANGE UP (ref 0–0.9)
MONOCYTES NFR BLD AUTO: 8.7 % — SIGNIFICANT CHANGE UP (ref 2–14)
NEUTROPHILS # BLD AUTO: 4.18 K/UL — SIGNIFICANT CHANGE UP (ref 1.8–7.4)
NEUTROPHILS NFR BLD AUTO: 67 % — SIGNIFICANT CHANGE UP (ref 43–77)
NRBC # FLD: 0 — SIGNIFICANT CHANGE UP
PCO2 BLDV: 55 MMHG — HIGH (ref 41–51)
PH BLDV: 7.4 PH — SIGNIFICANT CHANGE UP (ref 7.32–7.43)
PLATELET # BLD AUTO: 266 K/UL — SIGNIFICANT CHANGE UP (ref 150–400)
PMV BLD: 11.3 FL — SIGNIFICANT CHANGE UP (ref 7–13)
PO2 BLDV: 31 MMHG — LOW (ref 35–40)
POTASSIUM BLDV-SCNC: 3.5 MMOL/L — SIGNIFICANT CHANGE UP (ref 3.4–4.5)
POTASSIUM SERPL-MCNC: 3.7 MMOL/L — SIGNIFICANT CHANGE UP (ref 3.5–5.3)
POTASSIUM SERPL-SCNC: 3.7 MMOL/L — SIGNIFICANT CHANGE UP (ref 3.5–5.3)
PROT SERPL-MCNC: 8.1 G/DL — SIGNIFICANT CHANGE UP (ref 6–8.3)
PROTHROM AB SERPL-ACNC: 10.4 SEC — SIGNIFICANT CHANGE UP (ref 9.8–13.1)
RBC # BLD: 5.85 M/UL — HIGH (ref 3.8–5.2)
RBC # FLD: 14.1 % — SIGNIFICANT CHANGE UP (ref 10.3–14.5)
SAO2 % BLDV: 53.4 % — LOW (ref 60–85)
SODIUM SERPL-SCNC: 141 MMOL/L — SIGNIFICANT CHANGE UP (ref 135–145)
TROPONIN T SERPL-MCNC: < 0.06 NG/ML — SIGNIFICANT CHANGE UP (ref 0–0.06)
TSH SERPL-MCNC: 1.68 UIU/ML — SIGNIFICANT CHANGE UP (ref 0.27–4.2)
WBC # BLD: 6.23 K/UL — SIGNIFICANT CHANGE UP (ref 3.8–10.5)
WBC # FLD AUTO: 6.23 K/UL — SIGNIFICANT CHANGE UP (ref 3.8–10.5)

## 2018-01-15 PROCEDURE — 99284 EMERGENCY DEPT VISIT MOD MDM: CPT | Mod: GC

## 2018-01-15 PROCEDURE — 90792 PSYCH DIAG EVAL W/MED SRVCS: CPT | Mod: GC

## 2018-01-15 RX ORDER — SODIUM CHLORIDE 9 MG/ML
1000 INJECTION INTRAMUSCULAR; INTRAVENOUS; SUBCUTANEOUS ONCE
Qty: 0 | Refills: 0 | Status: COMPLETED | OUTPATIENT
Start: 2018-01-15 | End: 2018-01-15

## 2018-01-15 RX ADMIN — SODIUM CHLORIDE 1000 MILLILITER(S): 9 INJECTION INTRAMUSCULAR; INTRAVENOUS; SUBCUTANEOUS at 10:56

## 2018-01-15 RX ADMIN — Medication 0.5 MILLIGRAM(S): at 10:56

## 2018-01-15 NOTE — ED BEHAVIORAL HEALTH ASSESSMENT NOTE - SUICIDE PROTECTIVE FACTORS
Supportive social network or family/Responsibility to family and others/Fear of death or dying due to pain/suffering/High frustration tolerance/Identifies reasons for living/Future oriented/Ability to cope with stress/Engaged in work or school

## 2018-01-15 NOTE — ED ADULT NURSE NOTE - OBJECTIVE STATEMENT
Pt received to rm 20, a&ox3, c/o nervousness when she awoke this morning with a pressure to head. Denies any chest pain, sob, discomfort. Pt is ambulatory independently. Labs drawn and sent, IVL placed. Afib on cm. vs as noted. Will monitor.

## 2018-01-15 NOTE — ED BEHAVIORAL HEALTH ASSESSMENT NOTE - SAFETY PLAN DETAILS
Discussed with patient if she feels suicidal/homicidal or acute worsening of symptoms, to please call 911 or return ED. Patient verbalized understanding.

## 2018-01-15 NOTE — ED PROVIDER NOTE - PROGRESS NOTE DETAILS
ED Attending Dr. Elena: spoke to , will come speak to pt ED Attending Dr. Elena: pt seen by , no acute intervention at this time, was given outpatient referral information Lila: Spoke to Ms. Abraham, Pt's daughter, who stated that Pt has an appointment with a cardiologist on Friday. Pt is in agreement to follow up with her appointment. She is stable and ready to be discharged. ED Attending Dr. Elena: pt feeling well, in NAD, has appointment with her cardiologist in 4 days and agrees to go to visit; will not prescribe benzo to pt due to age, but recommended that pt follow up with her PMD/therapist for further treatment of possible anxiety; was given referral information from  for a therapist

## 2018-01-15 NOTE — ED BEHAVIORAL HEALTH ASSESSMENT NOTE - DESCRIPTION
Patient was calm and cooperative in the ED and did not exhibit any aggression. Pt did not require any prn medications or any physical restraints.    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

## 2018-01-15 NOTE — ED BEHAVIORAL HEALTH ASSESSMENT NOTE - CURRENT MEDICATION
acetaminophen 325 mg PRN; simvastatin 10 mg qD, aspirin 81 mg oral delayed release qD, dilTIAZem 360 mg/24 hours qD, hydroCHLOROthiazide 25 mg q D every other day, docusate sodium 100 mg BID, pioglitazone 30 mg qD, Januvia 25 mg q D, glipiZIDE 10 mg BID

## 2018-01-15 NOTE — ED BEHAVIORAL HEALTH ASSESSMENT NOTE - REFERRAL / APPOINTMENT DETAILS
outpatient referrals given ([facility], [facility], [facility], Walk ins)

## 2018-01-15 NOTE — ED BEHAVIORAL HEALTH ASSESSMENT NOTE - SUMMARY
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 Consult called to speak with mental health provider regarding options for anxiety treatment.     Patient is presenting without any acute safety concerns, no indication that patient is psychiatrically compromised, cardiac condition is safely monitored and attends all her appointments, is able to complete her ADLs alone, and still works and has a social life. Denies any current anxiety or depression, but has intermittent anxiety without any provacation, denies any suicidal ideation / homicidal ideation or any past suicide attempts. Patient denies AVH and no delusions are elicited.  Patient given referrals for outpatient therapy & psychiatrist. Will follow up as an outpatient once medically cleared. Patient in accord of treatment planning without any safety concerns.

## 2018-01-15 NOTE — ED BEHAVIORAL HEALTH ASSESSMENT NOTE - RISK ASSESSMENT
Risk factors: acute onset anxiety, comorbid medical causes, elderly and living alone  Protective factors: positive social support, spirituality, sense of responsibility to family, reality testing ability, positive coping skills, employed, no guns in the home, denies any SI/HI, no past attempts, no SIB, and no past aggression. The patient does not present an imminent risk of suicide/homicide at this time.    Patient does not meet criteria for inpatient hospitalization and will follow up to an outpatient provider.

## 2018-01-15 NOTE — ED BEHAVIORAL HEALTH ASSESSMENT NOTE - CASE SUMMARY
In brief, this is an 86 yo F with multiple medical problems, presenting in A fib but also with complaints of panic attacks for the past 3 weeks. Was given Ativan by the medical attending.  No SI, HI, AH, VH, other psychotic symptoms.  Pleasant, appropriate, NAD, good eye contact, speech WNLs, not internally preoccupied, "ok" mood on MSE.  No need for acute psychiatric intervention.  Provided with outpatient follow-up resources and told to call 911 or go to the nearest ED if thoughts of hurting self or others.  Patient expressed verbal understanding and agreement with plan.

## 2018-01-15 NOTE — ED ADULT NURSE NOTE - PMH
Arthritis involving multiple sites    Atrial fibrillation, unspecified type    DM2 (diabetes mellitus, type 2)    HLD (hyperlipidemia)    HTN (hypertension)

## 2018-01-15 NOTE — ED PROVIDER NOTE - OBJECTIVE STATEMENT
86yo F w/ pmhx of  PAUL diallo (no anticoagulation), HTN, DM, BIBEMS for anxiety for the past couple of days. Pt was last seen here on 01/09 for PAUL diallo with RVR,  HR in the 130's. She had refused anticoagulation and signed out AMA. Pt has been feeling anxious, headache and weakness, which are temporarily relieved with Tylenol. No known hx of anxiety. Pt stated that she is afraid of dying. She lives by herself and her children lives in [place]. Denies any chest pain, palpitation, nausea, vomiting, diaphoresis, lightheaded or any other symptoms.

## 2018-01-15 NOTE — ED PROVIDER NOTE - ATTENDING CONTRIBUTION TO CARE
ED Attending Dr. Elena: 88 yo female with arthritis, afib (on diltiazem, no AC [pt declines]), DM on PO meds, HLD, HTN, ED visit 6 days ago for afib RVR but declined workup/admission at that time and signed out AMA, in ED complaining of 1 week of feeling very "nervous".  Today was accompanied by feeling like she "couldn't take a deep breath" and so she called EMS.  Pt states that every day for 1 week she has been feeling this way, associated with a tight sensation in her head "like wearing a tight hat" but WITHOUT headache (as per pt).  Pt's "nervous" feeling is relieved by taking acetaminophen.  When pt attempts to sleep she feels like she starts worrying about dying and cannot sleep, which she then becomes nervous about.  At no time throughout has she had CP, N/V/D or abdominal pain.  She continues to eat normally.  Pt thinks that she may have had a panic attack, but has never been diagnosed with panic attacks so she is unsure.  In the ED pt denies any of these mentioned complaints, states that they are improved in the ED. ED Attending Dr. Elena: 86 yo female with arthritis, afib (on diltiazem, no AC [pt declines]), DM on PO meds, HLD, HTN, ED visit 6 days ago for afib RVR but declined workup/admission at that time and signed out AMA, in ED complaining of 1 week of feeling very "nervous".  Today was accompanied by feeling like she "couldn't take a deep breath" and so she called EMS.  Pt states that every day for 1 week she has been feeling this way, associated with a tight sensation in her head "like wearing a tight hat" but WITHOUT headache (as per pt).  Pt's "nervous" feeling is relieved by taking acetaminophen.  When pt attempts to sleep she feels like she starts worrying about dying and cannot sleep, which she then becomes nervous about.  At no time throughout has she had CP, N/V/D or abdominal pain.  No SI/HI.  She continues to eat normally.  Pt thinks that she may have had a panic attack, but has never been diagnosed with panic attacks so she is unsure.  In the ED pt denies any of these mentioned complaints, states that they are improved in the ED.

## 2018-01-15 NOTE — ED BEHAVIORAL HEALTH ASSESSMENT NOTE - HPI (INCLUDE ILLNESS QUALITY, SEVERITY, DURATION, TIMING, CONTEXT, MODIFYING FACTORS, ASSOCIATED SIGNS AND SYMPTOMS)
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 Consult called to speak with mental health provider regarding options for anxiety treatment.     Patient reports last week she felt hear heart racing and felt scared to be alone and sat in the lobby of her apartment building to be near people. Anxious when alone. States has been thinking of the possibility of death and that she may near the end of her life, even though her cardiac condition is safely monitored and she attends all her appointments, is able to complete her ADLs alone, and still works and has a social life. States she has never had any depression or anxiety in the past, and does not know where this is coming from. Waking up at night and worried about possibility of death ("is my time up"). Denies any suicidal ideation / homicidal ideation or any past suicide attempts. Denies depression and mood symptoms.  Pt denies manic symptoms past and present.  Patient denies AVH and no delusions are elicited.  Patient denies SI/HI, intent and plan. No active substance abuse reported.    Discussed in depth solitude, end of life phases in context of medical comorbidities, and strengths of having social [place] and children. Stated for anxiety, best treatment is therapy to find underlying causes and psychiatrist as an outpatient for possibility of SSRI/ Benzodiazapine. Patient given referrals for outpatient therapy & psychiatrist. Denies any SI/HI or any acute safety concerns. "I just wanted to speak to someone that's all. Because this is new for me." Patient was at [facility] ED Thursday for chest pain in context of Afib and left AMA. Tody she will be treated for same reason and remain for medical evaluation. Will follow up as an outpatient once medically cleared. Patient in accord of treatment planning.    Collateral information was not available at this time. No acute safety concerns.

## 2018-10-20 PROBLEM — I48.91 UNSPECIFIED ATRIAL FIBRILLATION: Chronic | Status: ACTIVE | Noted: 2018-01-10

## 2018-10-20 NOTE — ED ADULT TRIAGE NOTE - CHIEF COMPLAINT QUOTE
BIBEMS s/p slip and fall, denies hitting her head or LOC, complaining of L knee and wrist pain. No deformity, no bruising. Patient takes Xarelto. Unable to bear weight on extremity.

## 2018-10-20 NOTE — H&P ADULT - NSHPPHYSICALEXAM_GEN_ALL_CORE
Vital Signs Last 24 Hrs  T(C): 36.9 (20 Oct 2018 11:45), Max: 36.9 (20 Oct 2018 11:45)  T(F): 98.5 (20 Oct 2018 11:45), Max: 98.5 (20 Oct 2018 11:45)  HR: 100 (20 Oct 2018 11:45) (83 - 100)  BP: 139/90 (20 Oct 2018 11:45) (139/90 - 176/114)  BP(mean): --  RR: 18 (20 Oct 2018 11:45) (17 - 18)  SpO2: 100% (20 Oct 2018 11:45) (98% - 100%)    PHYSICAL EXAM:  GENERAL: NAD, well-developed  HEAD:  Atraumatic, Normocephalic  EYES: EOMI, conjunctiva and sclera clear  NECK: Supple, No JVD  CHEST/LUNG: Clear to auscultation bilaterally; No wheeze  HEART: s1/s2  ABDOMEN: Soft, Nontender, Nondistended; Bowel sounds present  EXTREMITIES:  LT elbow bruise/ wrist medial LT knee swollen tender    PSYCH: AAOx2 self and    NEUROLOGY: LT LE prox strength exam limited by pain; distal LT LE 4/5    SKIN: No rashes or lesions

## 2018-10-20 NOTE — ED PROVIDER NOTE - MEDICAL DECISION MAKING DETAILS
Fall unclear etiology PLAN ekg, cbc, cmp, pain control, ct head r/o ich, xr of tender joints DISPO if non ambulatory adm to medicine for placement.

## 2018-10-20 NOTE — H&P ADULT - PROBLEM SELECTOR PLAN 1
-arthritis with fall   -LT knee swelling likely effusion from OA minimal redness low suspicion of septic arthritis +LT elbow and wrist bruise fall. no obvious hematoma at LT hip xray neg for fracture  -PT consult  -Daughter at bedside states mom is slightly confused doesn't remember which hospital she is at normally at baseline AOX3 and takes care of herself and drives will attempt to control pain with toradol standing will add PPI for ulcer prophylaxis in attempt to minimize opiate use.  -check orthostatics  -mildly elevated WBC likely reactive denies any urinary symptoms. -arthritis with fall   -LT knee swelling likely effusion from OA minimal redness low suspicion of septic arthritis +LT elbow and wrist bruise fall. no obvious hematoma at LT hip xray neg for fracture  -PT consult  -Daughter at bedside states mom is slightly confused doesn't remember which hospital she is at normally at baseline AOX3 and takes care of herself and drives. will attempt to control pain with toradol standing will add PPI for ulcer prophylaxis in attempt to minimize opiate use.  -check orthostatics  -mildly elevated WBC likely reactive denies any urinary symptoms.

## 2018-10-20 NOTE — ED ADULT NURSE NOTE - NSIMPLEMENTINTERV_GEN_ALL_ED
Implemented All Fall with Harm Risk Interventions:  [place] to call system. Call bell, personal items and telephone within reach. Instruct patient to call for assistance. Room bathroom lighting operational. Non-slip footwear when patient is off stretcher. Physically safe environment: no spills, clutter or unnecessary equipment. Stretcher in lowest position, wheels locked, appropriate side rails in place. Provide visual cue, wrist band, yellow gown, etc. Monitor gait and stability. Monitor for mental status changes and reorient to person, place, and time. Review medications for side effects contributing to fall risk. Reinforce activity limits and safety measures with patient and family. Provide visual clues: red socks.

## 2018-10-20 NOTE — ED PROVIDER NOTE - CARDIAC JVD
"    Preventive Care at the 15 - 18 Year Visit    Growth Percentiles & Measurements   Weight: 141 lbs 0 oz / 64 kg (actual weight) / 51 %ile based on CDC 2-20 Years weight-for-age data using vitals from 8/20/2018.   Length: 5' 11\" / 180.3 cm 77 %ile based on CDC 2-20 Years stature-for-age data using vitals from 8/20/2018.   BMI: Body mass index is 19.67 kg/(m^2). 29 %ile based on CDC 2-20 Years BMI-for-age data using vitals from 8/20/2018.   Blood Pressure: Blood pressure percentiles are 1.9 % systolic and 18.2 % diastolic based on the August 2017 AAP Clinical Practice Guideline.    Next Visit    Continue to see your health care provider every year for preventive care.    Nutrition    It s very important to eat breakfast. This will help you make it through the morning.    Sit down with your family for a meal on a regular basis.    Eat healthy meals and snacks, including fruits and vegetables. Avoid salty and sugary snack foods.    Be sure to eat foods that are high in calcium and iron.    Avoid or limit caffeine (often found in soda pop).    Sleeping    Your body needs about 9 hours of sleep each night.    Keep screens (TV, computer, and video) out of the bedroom / sleeping area.  They can lead to poor sleep habits and increased obesity.    Health    Limit TV, computer and video time.    Set a goal to be physically fit.  Do some form of exercise every day.  It can be an active sport like skating, running, swimming, a team sport, etc.    Try to get 30 to 60 minutes of exercise at least three times a week.    Make healthy choices: don t smoke or drink alcohol; don t use drugs.    In your teen years, you can expect . . .    To develop or strengthen hobbies.    To build strong friendships.    To be more responsible for yourself and your actions.    To be more independent.    To set more goals for yourself.    To use words that best express your thoughts and feelings.    To develop self-confidence and a sense of " self.    To make choices about your education and future career.    To see big differences in how you and your friends grow and develop.    To have body odor from perspiration (sweating).  Use underarm deodorant each day.    To have some acne, sometimes or all the time.  (Talk with your doctor or nurse about this.)    Most girls have finished going through puberty by 15 to 16 years. Often, boys are still growing and building muscle mass.    Sexuality    It is normal to have sexual feelings.    Find a supportive person who can answer questions about puberty, sexual development, sex, abstinence (choosing not to have sex), sexually transmitted diseases (STDs) and birth control.    Think about how you can say no to sex.    Safety    Accidents are the greatest threat to your health and life.    Avoid dangerous behaviors and situations.  For example, never drive after drinking or using drugs.  Never get in a car if the  has been drinking or using drugs.    Always wear a seat belt in the car.  When you drive, make it a rule for all passengers to wear seat belts, too.    Stay within the speed limit and avoid distractions.    Practice a fire escape plan at home. Check smoke detector batteries twice a year.    Keep electric items (like blow dryers, razors, curling irons, etc.) away from water.    Wear a helmet and other protective gear when bike riding, skating, skateboarding, etc.    Use sunscreen to reduce your risk of skin cancer.    Learn first aid and CPR (cardiopulmonary resuscitation).    Avoid peers who try to pressure you into risky activities.    Learn skills to manage stress, anger and conflict.    Do not use or carry any kind of weapon.    Find a supportive person (teacher, parent, health provider, counselor) whom you can talk to when you feel sad, angry, lonely or like hurting yourself.    Find help if you are being abused physically or sexually, or if you fear being hurt by others.    As a teenager, you  will be given more responsibility for your health and health care decisions.  While your parent or guardian still has an important role, you will likely start spending some time alone with your health care provider as you get older.  Some teen health issues are actually considered confidential, and are protected by law.  Your health care team will discuss this and what it means with you.  Our goal is for you to become comfortable and confident caring for your own health.  ================================================================   non-distended bilaterally

## 2018-10-20 NOTE — ED PROVIDER NOTE - OBJECTIVE STATEMENT
05:19 Lucina att: 88F h/o afib on xarelto c/o fall and lt sided pain. 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 Denies head trauma, denies blurry vision, denies n/v. Denies neck pain, cp, sob, abd pain. Reports pain to lt wrist, lt knee, and unable to stand. PMH htn, hld, dm, afib, dermatitis of back, hip replacement SH lives alone, no stairs in home and no aide

## 2018-10-20 NOTE — H&P ADULT - NSHPLABSRESULTS_GEN_ALL_CORE
13.4   11.16 )-----------( 245      ( 20 Oct 2018 05:35 )             42.0       10-20    136  |  95<L>  |  28<H>  ----------------------------<  303<H>  3.8   |  27  |  1.11    Ca    9.7      20 Oct 2018 05:35    TPro  7.5  /  Alb  3.9  /  TBili  0.6  /  DBili  x   /  AST  18  /  ALT  10  /  AlkPhos  107  10-20      CAPILLARY BLOOD GLUCOSE      POCT Blood Glucose.: 319 mg/dL (20 Oct 2018 08:35)              Radiology

## 2018-10-20 NOTE — ED ADULT NURSE NOTE - OBJECTIVE STATEMENT
88y F AaOx3 brought in by ambulance after a mechanical fall. pt states that she is in 8/10 pain located in her left knee and wrist. pt shows ecchymotic around 88y F AaOx3 brought in by ambulance after a mechanical fall. pt states that she is in 8/10 pain located in her left knee and wrist. pt shows ecchymotic around wrist. pt denies hitting her head. pt denies syncope and dizziness. pt denies blurry vision, diplopia , and light headiness. pt to receive xrays and pain medication.

## 2018-10-20 NOTE — ED PROVIDER NOTE - ATTENDING CONTRIBUTION TO CARE
Dr. Verdugo: I have personally seen and examined this patient at the bedside. I have fully participated in the care of this patient. I have reviewed all pertinent clinical information, including history, physical exam, plan and the Resident's note and agree except as noted. HPI above as by me. PE above as by me. Fall unclear etiology PLAN ekg, cbc, cmp, pain control, ct head r/o ich, xr of tender joints DISPO if non ambulatory adm to medicine for placement.

## 2018-10-20 NOTE — H&P ADULT - HISTORY OF PRESENT ILLNESS
Pt is a 87 yo F w/ hx Afib (on DOAC), DM, HTN  p/w fall. 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  In ED she had multiple xray all neg for fracture she received morphine for pain. She denies LOC/palpitation/dizziness. She is being admitted for placement

## 2018-10-20 NOTE — H&P ADULT - ASSESSMENT
Pt is a 89 yo F w/ hx afib on DOAC, DM. HTN p/w fall without LOC. CT head neg for ICH. xray of hip/LT arm/LT knee neg for fractures +effusion tricompartment OA. WBC-11.16

## 2018-10-20 NOTE — ED PROVIDER NOTE - NS ED MD TWO NIGHTS YN
Phoned Dr. Em March with report of arrival of patient via ambulance in active labor; previous  section x1 at [facility] for \"cord around the neck\"; [facility] per patient; no prenatal records available.  Informed patient requesting repeat cesarea Yes

## 2018-10-21 NOTE — PHYSICAL THERAPY INITIAL EVALUATION ADULT - ACTIVE RANGE OF MOTION EXAMINATION, REHAB EVAL
bilateral upper extremity Active ROM was WFL (within functional limits)/Right LE Active ROM was WFL (within functional limits)/Left Knee flexion ~30 degrees 2/2 to pain/weakness

## 2018-10-21 NOTE — CONSULT NOTE ADULT - SUBJECTIVE AND OBJECTIVE BOX
HPI:  Pt is a 87 yo F w/ hx Afib, on renally-dosed Xarelto for CVA prevention, HLD, DM, HTN  p/w fall. 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  She landed on her left side and was unable to ambulate, but managed to call EMS and was brought to ED by ambulance.  In ED she had multiple xrays, all neg for fractures.  She denies LOC/palpitation/dizziness. She is being admitted for rehab/ placement as she lives alone.    Her Last NST in my office 1/2018 with no ischemia or infarct, EF 59%.  Her last TTE in my office1/2018 with Normal LV systolic fxn, Normal RV function, Mild MR, and moderate diastolic dysfunction.    She denies CP, SOB, palps, near syncope or syncope.  No prior syncopal episodes.  She reports "shes getting older and my arthritis is bad and my legs are just weaker."      PAST MEDICAL & SURGICAL HISTORY:  Atrial fibrillation, unspecified type  HLD (hyperlipidemia)  DM2 (diabetes mellitus, type 2)  HTN (hypertension)  Arthritis involving multiple sites  S/P hip replacement    MEDICATIONS  (STANDING):  diltiazem    milliGRAM(s) Oral daily  insulin lispro (HumaLOG) corrective regimen sliding scale   SubCutaneous three times a day before meals  insulin lispro (HumaLOG) corrective regimen sliding scale   SubCutaneous at bedtime  ketorolac   Injectable 15 milliGRAM(s) IV Push every 6 hours  metoprolol succinate ER 25 milliGRAM(s) Oral daily  pantoprazole    Tablet 40 milliGRAM(s) Oral before breakfast  PARoxetine 20 milliGRAM(s) Oral daily  rivaroxaban 15 milliGRAM(s) Oral every 24 hours  triamcinolone 0.1% Cream 1 Application(s) Topical two times a day    Allergies  No Known Allergies    FAMILY HISTORY:  No pertinent family history in first degree relatives  Noncontributory for premature coronary disease or sudden cardiac death    SOCIAL HISTORY:    [x ] Non-smoker  [ ] Smoker  [ ] Alcohol      REVIEW OF SYSTEMS:  [ ]chest pain  [  ]shortness of breath  [  ]palpitations  [  ]syncope  [ ]near syncope [ ]upper extremity weakness   [x ] lower extremity weakness  [  ]diplopia  [  ]altered mental status   [  ]fevers  [ ]chills [ ]nausea  [ ]vomitting  [  ]dysphagia    [ ]abdominal pain  [ ]melena  [ ]BRBPR    [  ]epistaxis  [  ]rash  [ ]lower extremity edema      [x ] All others negative	  [ ] Unable to obtain    PHYSICAL EXAM:  T(C): 36.5 (10-21-18 @ 04:53), Max: 36.9 (10-20-18 @ 11:45)  HR: 86 (10-21-18 @ 04:53) (86 - 100)  BP: 160/90 (10-21-18 @ 04:53) (135/87 - 160/90)  RR: 17 (10-21-18 @ 04:53) (17 - 18)  SpO2: 97% (10-21-18 @ 04:53) (96% - 100%)    HEENT:   Normal oral mucosa, PERRL, EOMI	  Lymphatic: No lymphadenopathy , no edema  Cardiovascular: Normal S1 S2, No JVD, No murmurs , Peripheral pulses palpable 2+ bilaterally  Respiratory: Lungs clear to auscultation, normal effort 	  Gastrointestinal:  Soft, Non-tender, + BS	  Skin: No rashes, No ecchymoses, No cyanosis, warm to touch    DIAGNOSTIC DATA:     ECG: Afib, left axis deviation, old IWMI age undet, vrate 92 bpm 	    Echo/NST: stated in HPI    < from: CT Head No Cont (10.20.18 @ 05:02) >  IMPRESSION:    No acute intracranial hemorrhage, mass effect, or displaced calvarial   fracture.   < end of copied text >    LABS:                      13.5   10.23 )-----------( 223      ( 21 Oct 2018 06:26 )             43.1     137  |  97<L>  |  23  ----------------------------<  244<H>  4.1   |  26  |  0.99    Ca    9.3      21 Oct 2018 06:26    TPro  6.8  /  Alb  3.1<L>  /  TBili  0.9  /  DBili  x   /  AST  17  /  ALT  11  /  AlkPhos  83  10-21    Hemoglobin A1C, Whole Blood: 10.8 % (10-21 @ 06:26)      ASSESSMENT/PLAN:   Pt is a 87 yo F w/ hx Afib, on renally-dosed Xarelto for CVA prevention, HLD, DM, HTN  p/w fall.  Denies syncope.  Her Last NST in my office 1/2018 with no ischemia or infarct, EF 59%.  Her last TTE in my office1/2018 with Normal LV systolic fxn, Normal RV function, Mild MR, and moderate diastolic dysfunction.  She denies CP, SOB, palps, near syncope or syncope.     --Cont Xarelto unless absolutely contraindicated  --PT eval  --Check orthostatics  --Check repeat TTE

## 2018-10-21 NOTE — DISCHARGE NOTE ADULT - SECONDARY DIAGNOSIS.
Fall, initial encounter Atrial fibrillation, unspecified type Memory deficit Type 2 diabetes mellitus with complication, without long-term current use of insulin Hyperlipidemia, unspecified hyperlipidemia type HTN (hypertension)

## 2018-10-21 NOTE — PHYSICAL THERAPY INITIAL EVALUATION ADULT - PERTINENT HX OF CURRENT PROBLEM, REHAB EVAL
Pt is a 89 yo F w/ hx afib on DOAC, DM. HTN p/w fall without LOC. CT head neg for ICH. x-ray of hip/ Left arm/ Left knee neg for fractures +effusion tricompartment OA.

## 2018-10-21 NOTE — PHYSICAL THERAPY INITIAL EVALUATION ADULT - ADDITIONAL COMMENTS
Pt reports that she lives alone in an apartment with no steps to negotiate. Prior to hospital admission pt was completely independent and used either a single axis cane or rolling walker with ambulation. Pt denies any recent falls other than the one that brought her in.    Pt left comfortable in bed, NAD, all lines intact, all precautions maintained, with call bell in reach, and RN aware of PT evaluation.

## 2018-10-21 NOTE — CONSULT NOTE ADULT - SUBJECTIVE AND OBJECTIVE BOX
Endocrinology Attending Covering For Dr. Alvarez    HPI:  Pt is a 89 yo F w/ hx Afib (on DOAC), DM, HTN  p/w fall. 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  In ED she had multiple xray all neg for fracture she received morphine for pain. She denies LOC/palpitation/dizziness. She is being admitted for placement (20 Oct 2018 13:52)  Patient has history of diabetes, for 10 Years, f/u by HIP on oral meds at home : Glipizide, Januvia, and actos , no recent hypoglycemic episodes, no polyuria polydipsia. Patient follows up with PCP for diabetes management.    PAST MEDICAL & SURGICAL HISTORY:  Atrial fibrillation, unspecified type  HLD (hyperlipidemia)  DM2 (diabetes mellitus, type 2)  HTN (hypertension)  Arthritis involving multiple sites  S/P hip replacement      FAMILY HISTORY:  No pertinent family history in first degree relatives      Social History:    Outpatient Medications:    MEDICATIONS  (STANDING):  dextrose 5%. 1000 milliLiter(s) (50 mL/Hr) IV Continuous <Continuous>  dextrose 5%. 1000 milliLiter(s) (50 mL/Hr) IV Continuous <Continuous>  dextrose 50% Injectable 12.5 Gram(s) IV Push once  dextrose 50% Injectable 25 Gram(s) IV Push once  dextrose 50% Injectable 25 Gram(s) IV Push once  dextrose 50% Injectable 12.5 Gram(s) IV Push once  dextrose 50% Injectable 25 Gram(s) IV Push once  dextrose 50% Injectable 25 Gram(s) IV Push once  diltiazem    milliGRAM(s) Oral daily  insulin lispro (HumaLOG) corrective regimen sliding scale   SubCutaneous three times a day before meals  insulin lispro (HumaLOG) corrective regimen sliding scale   SubCutaneous at bedtime  metoprolol succinate ER 25 milliGRAM(s) Oral daily  pantoprazole    Tablet 40 milliGRAM(s) Oral before breakfast  PARoxetine 20 milliGRAM(s) Oral daily  rivaroxaban 15 milliGRAM(s) Oral every 24 hours  triamcinolone 0.1% Cream 1 Application(s) Topical two times a day    MEDICATIONS  (PRN):  acetaminophen   Tablet .. 650 milliGRAM(s) Oral every 6 hours PRN Temp greater or equal to 38C (100.4F)  artificial  tears Solution 1 Drop(s) Both EYES daily PRN Dry Eyes  dextrose 40% Gel 15 Gram(s) Oral once PRN Blood Glucose LESS THAN 70 milliGRAM(s)/deciLiter  dextrose 40% Gel 15 Gram(s) Oral once PRN Blood Glucose LESS THAN 70 milliGRAM(s)/deciliter  glucagon  Injectable 1 milliGRAM(s) IntraMuscular once PRN Glucose <70 milliGRAM(s)/deciLiter  glucagon  Injectable 1 milliGRAM(s) IntraMuscular once PRN Glucose LESS THAN 70 milligrams/deciliter  morphine  - Injectable 2 milliGRAM(s) IV Push every 4 hours PRN moderate and severe pain  ondansetron Injectable 4 milliGRAM(s) IV Push every 6 hours PRN Nausea      Allergies    No Known Allergies    Intolerances      Review of Systems:  Constitutional: No fever, no chills  Eyes: No blurry vision  Neuro: No tremors  HEENT: No pain, no neck swelling  Cardiovascular: No chest pain, no palpitations  Respiratory: Has SOB, no cough  GI: No nausea, vomiting, abdominal pain  : No dysuria  Skin: no rash  MSK: Has leg swelling, no foot ulcers.  Psych: no depression  Endocrine: no polyuria, polydipsia    ALL OTHER SYSTEMS REVIEWED AND NEGATIVE    UNABLE TO OBTAIN    PHYSICAL EXAM:  VITALS: T(C): 36.7 (10-21-18 @ 13:04)  T(F): 98.1 (10-21-18 @ 13:04), Max: 98.1 (10-20-18 @ 20:45)  HR: 68 (10-21-18 @ 13:04) (68 - 90)  BP: 127/74 (10-21-18 @ 13:04) (127/74 - 160/90)  RR:  (17 - 17)  SpO2:  (95% - 98%)  Wt(kg): --  GENERAL: NAD, well-groomed, well-developed  EYES: No proptosis, no lid lag  HEENT:  Atraumatic, Normocephalic  THYROID: Normal size, no palpable nodules  RESPIRATORY: Clear to auscultation bilaterally; No rales, rhonchi, wheezing  CARDIOVASCULAR: Si S2, No murmurs;  GI: Soft, non distended, normal bowel sounds  SKIN: Dry, intact, No rashes or lesions  MUSCULOSKELETAL: Has BL lower extremity edema.  NEURO:  no tremor, sensation decreased in feet BL,    POCT Blood Glucose.: 222 mg/dL (10-21-18 @ 17:29)  POCT Blood Glucose.: 232 mg/dL (10-21-18 @ 12:22)  POCT Blood Glucose.: 276 mg/dL (10-21-18 @ 09:53)  POCT Blood Glucose.: 220 mg/dL (10-21-18 @ 08:18)  POCT Blood Glucose.: 183 mg/dL (10-20-18 @ 21:21)  POCT Blood Glucose.: 260 mg/dL (10-20-18 @ 17:20)  POCT Blood Glucose.: 319 mg/dL (10-20-18 @ 08:35)                            13.5   10.23 )-----------( 223      ( 21 Oct 2018 06:26 )             43.1       10-21    137  |  97<L>  |  23  ----------------------------<  244<H>  4.1   |  26  |  0.99    EGFR if : 59  EGFR if non : 51    Ca    9.3      10-21    TPro  6.8  /  Alb  3.1<L>  /  TBili  0.9  /  DBili  x   /  AST  17  /  ALT  11  /  AlkPhos  83  10-21      Thyroid Function Tests:      Hemoglobin A1C, Whole Blood: 10.8 % <H> [4.0 - 5.6] (10-21-18 @ 06:26)          Radiology:

## 2018-10-21 NOTE — CONSULT NOTE ADULT - ASSESSMENT
Assessment  DMT2: 88y Female with DM T2 with hyperglycemia on insulin, blood sugars running high, in acceptable range, improving, fluctuating, had no hypoglycemic episode,  eating meals,  non compliant with low carb diet.  HTN: Controlled, On med.  HLD; On statin  Afib: rate stable      Plan:   DMT2:  Will start  Lantus to  16 units qhs,  Humalog  6   units before each meal in addition to correction scale coverage, monitor FS will FU  Patient counseled for compliance with consistent low carb diet  HTN: Continue treatment, monitoring, Primary team FU  HLD; Continue statin  A fib: rate stable Assessment  DMT2: 88y Female with DM T2 with hyperglycemia on insulin, blood sugars running high, in acceptable range, improving, fluctuating, had no hypoglycemic episode,  eating meals,  non compliant with low carb diet. A1c-10.8  HTN: Controlled, On med.  HLD; On statin  Afib: rate stable      Plan:   DMT2:  Will start  Lantus to  16 units qhs,  Humalog  6   units before each meal in addition to correction scale coverage, monitor FS will FU  Patient counseled for compliance with consistent low carb diet  HTN: Continue treatment, monitoring, Primary team FU  HLD; Continue statin  A fib: rate stable

## 2018-10-21 NOTE — DISCHARGE NOTE ADULT - ADDITIONAL INSTRUCTIONS
Continue your medications as directed and please follow-up as an outpatient with your primary care provider for further care and recommendations.  Please follow-up as outpatient with Dr. Lopes for an echocardiogram upon hospital discharge.

## 2018-10-21 NOTE — DISCHARGE NOTE ADULT - NS TRANSFER PATIENT BELONGINGS
Clothing/Cell Phone/PDA (specify) Cell Phone/PDA (specify)/Clothing/Other belongings/Jewelry/Money (specify)/1silver 1yellow necklace w/pendant. , brown bag with personal items

## 2018-10-21 NOTE — PHYSICAL THERAPY INITIAL EVALUATION ADULT - DIAGNOSIS, PT EVAL
Pt s/p fall; X-Ray (-); CT (-); pt presents with decreased strength, decreased balance, and difficulty with ambulation.

## 2018-10-21 NOTE — PHYSICAL THERAPY INITIAL EVALUATION ADULT - CRITERIA FOR SKILLED THERAPEUTIC INTERVENTIONS
impairments found/therapy frequency/rehab potential/functional limitations in following categories/risk reduction/prevention

## 2018-10-21 NOTE — DISCHARGE NOTE ADULT - CARE PLAN
Principal Discharge DX:	Arthritis involving multiple sites  Goal:	Pain management  Secondary Diagnosis:	Fall, initial encounter  Assessment and plan of treatment:	You were admitted to the hospital after a fall. Please maintain a safe environment where you reside. It is recommended that you move in a careful manner to prevent future events. Perform any exercises recommended by physical therapy and follow-up with your PCP.  Secondary Diagnosis:	Atrial fibrillation, unspecified type  Assessment and plan of treatment:	Please continue your medications as directed and follow-up with your primary provider/cardiologist to further manage your care. Monitor for signs/symptoms of uncontrolled atrial fibrillation, such as, increased heart rate, palpitations, chest pain, dizziness, or shortness of breath - Return to emergency room if these signs/symptoms are present.  Secondary Diagnosis:	Memory deficit  Assessment and plan of treatment:	Neurology was consulted _________________  Secondary Diagnosis:	Type 2 diabetes mellitus with complication, without long-term current use of insulin  Assessment and plan of treatment:	*** ENDO CONSULTED ________________________ Continue your medication regimen and a consistent carbohydrate diet (Meaning eating the same amount of carbohydrates at the same time each day). Monitor blood glucose levels throughout the day before meals and at bedtime. Record blood sugars and bring to outpatient providers appointment in order to be reviewed by your doctor for management modifications. Monitor for signs/symptoms of low blood glucose, such as, dizziness, altered mental status, or cool/clammy skin. In addition, monitor for signs/symptoms of high blood glucose, such as, feeling hot, dry, fatigued, or with increased thirst/urination. Make regular podiatry appointments in order to have feet checked for wounds and uncontrolled toe nail growth to prevent infections, as well as, appointments with an ophthalmologist to monitor your vision.  Secondary Diagnosis:	Hyperlipidemia, unspecified hyperlipidemia type  Assessment and plan of treatment:	Continue cholesterol control medications. Continue DASH diet. Follow up with your PCP within 1 week of discharge for further management and monitoring of lipid and cholesterol panels.  Secondary Diagnosis:	HTN (hypertension)  Assessment and plan of treatment:	HCTZ ON DC _____________________Continue blood pressure medication regimen as directed. Monitor for any visual changes, headaches or dizziness.  Monitor blood pressure regularly.  Follow up with your PCP for further management for high blood pressure. Principal Discharge DX:	Arthritis involving multiple sites  Goal:	Pain management  Assessment and plan of treatment:	Continue tylenol as needed for pain  - continue to participate in physical therapy activities  Secondary Diagnosis:	Fall, initial encounter  Assessment and plan of treatment:	You were admitted to the hospital after a fall. Please maintain a safe environment where you reside. It is recommended that you move in a careful manner to prevent future events. Perform any exercises recommended by physical therapy and follow-up with your PCP.  Secondary Diagnosis:	Atrial fibrillation, unspecified type  Assessment and plan of treatment:	Please continue your medications as directed and follow-up with your primary provider/cardiologist to further manage your care. Monitor for signs/symptoms of uncontrolled atrial fibrillation, such as, increased heart rate, palpitations, chest pain, dizziness, or shortness of breath - Return to emergency room if these signs/symptoms are present.  Secondary Diagnosis:	Memory deficit  Assessment and plan of treatment:	Neurology recommended having outpatient neuropsych testing with an neurologist  Secondary Diagnosis:	Type 2 diabetes mellitus with complication, without long-term current use of insulin  Assessment and plan of treatment:	*** ENDO CONSULTED ________________________ Continue your medication regimen and a consistent carbohydrate diet (Meaning eating the same amount of carbohydrates at the same time each day). Monitor blood glucose levels throughout the day before meals and at bedtime. Record blood sugars and bring to outpatient providers appointment in order to be reviewed by your doctor for management modifications. Monitor for signs/symptoms of low blood glucose, such as, dizziness, altered mental status, or cool/clammy skin. In addition, monitor for signs/symptoms of high blood glucose, such as, feeling hot, dry, fatigued, or with increased thirst/urination. Make regular podiatry appointments in order to have feet checked for wounds and uncontrolled toe nail growth to prevent infections, as well as, appointments with an ophthalmologist to monitor your vision.  Secondary Diagnosis:	Hyperlipidemia, unspecified hyperlipidemia type  Assessment and plan of treatment:	Continue cholesterol control medications. Continue DASH diet. Follow up with your PCP within 1 week of discharge for further management and monitoring of lipid and cholesterol panels.  Secondary Diagnosis:	HTN (hypertension)  Assessment and plan of treatment:	HCTZ ON DC _____________________Continue blood pressure medication regimen as directed. Monitor for any visual changes, headaches or dizziness.  Monitor blood pressure regularly.  Follow up with your PCP for further management for high blood pressure. Principal Discharge DX:	Arthritis involving multiple sites  Goal:	Pain management  Assessment and plan of treatment:	Continue tylenol as needed for pain  - continue to participate in physical therapy activities  Secondary Diagnosis:	Fall, initial encounter  Assessment and plan of treatment:	You were admitted to the hospital after a fall. Please maintain a safe environment where you reside. It is recommended that you move in a careful manner to prevent future events. Perform any exercises recommended by physical therapy and follow-up with your PCP.  Secondary Diagnosis:	Atrial fibrillation, unspecified type  Assessment and plan of treatment:	Please continue your medications as directed and follow-up with your primary provider/cardiologist to further manage your care. Monitor for signs/symptoms of uncontrolled atrial fibrillation, such as, increased heart rate, palpitations, chest pain, dizziness, or shortness of breath - Return to emergency room if these signs/symptoms are present.  Secondary Diagnosis:	Memory deficit  Assessment and plan of treatment:	Neurology recommended having outpatient neuropsych testing with an neurologist  Secondary Diagnosis:	Type 2 diabetes mellitus with complication, without long-term current use of insulin  Assessment and plan of treatment:	Continue your medication regimen and a consistent carbohydrate diet (Meaning eating the same amount of carbohydrates at the same time each day). Monitor blood glucose levels throughout the day before meals and at bedtime. Record blood sugars and bring to outpatient providers appointment in order to be reviewed by your doctor for management modifications. Monitor for signs/symptoms of low blood glucose, such as, dizziness, altered mental status, or cool/clammy skin. In addition, monitor for signs/symptoms of high blood glucose, such as, feeling hot, dry, fatigued, or with increased thirst/urination. Make regular podiatry appointments in order to have feet checked for wounds and uncontrolled toe nail growth to prevent infections, as well as, appointments with an ophthalmologist to monitor your vision.  Follow up with endocrinology -  Secondary Diagnosis:	Hyperlipidemia, unspecified hyperlipidemia type  Assessment and plan of treatment:	Continue cholesterol control medications. Continue DASH diet. Follow up with your PCP within 1 week of discharge for further management and monitoring of lipid and cholesterol panels.  Secondary Diagnosis:	HTN (hypertension)  Assessment and plan of treatment:	Continue blood pressure medication regimen as directed. Monitor for any visual changes, headaches or dizziness.  Monitor blood pressure regularly.  Follow up with your PCP for further management for high blood pressure.  Discharge on hydrochlorothiazide daily

## 2018-10-21 NOTE — DISCHARGE NOTE ADULT - MEDICATION SUMMARY - MEDICATIONS TO STOP TAKING
I will STOP taking the medications listed below when I get home from the hospital:    Atarax 10 mg oral tablet  -- 1 tab(s) by mouth once a day (at bedtime)

## 2018-10-21 NOTE — DISCHARGE NOTE ADULT - PATIENT PORTAL LINK FT
You can access the [facility] Patient Portal, offered by [facility], by registering with the following website: [email or web address]

## 2018-10-21 NOTE — PROGRESS NOTE ADULT - SUBJECTIVE AND OBJECTIVE BOX
Patient is a 88y old  Female who presents with a chief complaint of fall (21 Oct 2018 10:56)  patient not known to me, assigned this AM to assume care  chart reviewed and events thus far noted  admitted overnight by full time hospitalist service        SUBJECTIVE / OVERNIGHT EVENTS: no overnight events    ROS:  Resp: No cough no sputum production  CVS: No chest pain no palpitations no orthopnea  GI: no N/V/D  : no dysuria, no hematuria  Neuro: no weakness no paresthesias  Heme: No petechiae no easy bruising  Msk: No joint pain no swelling  Skin: No rash no itching        MEDICATIONS  (STANDING):  dextrose 5%. 1000 milliLiter(s) (50 mL/Hr) IV Continuous <Continuous>  dextrose 5%. 1000 milliLiter(s) (50 mL/Hr) IV Continuous <Continuous>  dextrose 50% Injectable 12.5 Gram(s) IV Push once  dextrose 50% Injectable 25 Gram(s) IV Push once  dextrose 50% Injectable 25 Gram(s) IV Push once  dextrose 50% Injectable 12.5 Gram(s) IV Push once  dextrose 50% Injectable 25 Gram(s) IV Push once  dextrose 50% Injectable 25 Gram(s) IV Push once  diltiazem    milliGRAM(s) Oral daily  insulin lispro (HumaLOG) corrective regimen sliding scale   SubCutaneous three times a day before meals  insulin lispro (HumaLOG) corrective regimen sliding scale   SubCutaneous at bedtime  ketorolac   Injectable 15 milliGRAM(s) IV Push every 6 hours  metoprolol succinate ER 25 milliGRAM(s) Oral daily  pantoprazole    Tablet 40 milliGRAM(s) Oral before breakfast  PARoxetine 20 milliGRAM(s) Oral daily  rivaroxaban 15 milliGRAM(s) Oral every 24 hours  triamcinolone 0.1% Cream 1 Application(s) Topical two times a day    MEDICATIONS  (PRN):  acetaminophen   Tablet .. 650 milliGRAM(s) Oral every 6 hours PRN Temp greater or equal to 38C (100.4F)  artificial  tears Solution 1 Drop(s) Both EYES daily PRN Dry Eyes  dextrose 40% Gel 15 Gram(s) Oral once PRN Blood Glucose LESS THAN 70 milliGRAM(s)/deciLiter  dextrose 40% Gel 15 Gram(s) Oral once PRN Blood Glucose LESS THAN 70 milliGRAM(s)/deciliter  glucagon  Injectable 1 milliGRAM(s) IntraMuscular once PRN Glucose <70 milliGRAM(s)/deciLiter  glucagon  Injectable 1 milliGRAM(s) IntraMuscular once PRN Glucose LESS THAN 70 milligrams/deciliter  morphine  - Injectable 2 milliGRAM(s) IV Push every 4 hours PRN moderate and severe pain  ondansetron Injectable 4 milliGRAM(s) IV Push every 6 hours PRN Nausea        CAPILLARY BLOOD GLUCOSE      POCT Blood Glucose.: 276 mg/dL (21 Oct 2018 09:53)  POCT Blood Glucose.: 220 mg/dL (21 Oct 2018 08:18)  POCT Blood Glucose.: 183 mg/dL (20 Oct 2018 21:21)  POCT Blood Glucose.: 260 mg/dL (20 Oct 2018 17:20)    I&O's Summary      Vital Signs Last 24 Hrs  T(C): 36.5 (21 Oct 2018 04:53), Max: 36.9 (20 Oct 2018 15:36)  T(F): 97.7 (21 Oct 2018 04:53), Max: 98.5 (20 Oct 2018 15:36)  HR: 86 (21 Oct 2018 04:53) (86 - 95)  BP: 160/90 (21 Oct 2018 04:53) (135/87 - 160/90)  BP(mean): --  RR: 17 (21 Oct 2018 04:53) (17 - 18)  SpO2: 97% (21 Oct 2018 04:53) (96% - 98%)    PHYSICAL EXAM:  GENERAL: NAD, well-developed  HEAD:  Atraumatic, Normocephalic  EYES: EOMI, PERRLA, conjunctiva and sclera clear  NECK: Supple, No JVD  CHEST/LUNG: no rhonchi, no wheeze, clear to auscultation bilaterally  HEART: S1 S2; irreg? No rubs or gallop, soft ejection systolic murmur best heard at left sternal border   ABDOMEN: Soft, Nontender, Nondistended; Bowel sounds present  EXTREMITIES:  No clubbing or cyanosis, + Peripheral Pulses,  no edema  PSYCH: AO x 2 - 3 appropriate affect  NEUROLOGY: non-focal, motor and sensory systems intact  SKIN: No rashes or lesions    LABS:                        13.5   10.23 )-----------( 223      ( 21 Oct 2018 06:26 )             43.1     10-21    137  |  97<L>  |  23  ----------------------------<  244<H>  4.1   |  26  |  0.99    Ca    9.3      21 Oct 2018 06:26    TPro  6.8  /  Alb  3.1<L>  /  TBili  0.9  /  DBili  x   /  AST  17  /  ALT  11  /  AlkPhos  83  10-21                All consultant(s) notes reviewed and care discussed with other providers    Contact Number, Dr Heath [phone] Patient is a 88y old  Female who presents with a chief complaint of fall (21 Oct 2018 10:56)  patient not known to me, assigned this AM to assume care  chart reviewed and events thus far noted  admitted overnight by full time hospitalist service      SUBJECTIVE / OVERNIGHT EVENTS: no overnight events    ROS:  Resp: No cough no sputum production  CVS: No chest pain no palpitations no orthopnea  GI: no N/V/D  : no dysuria, no hematuria  Neuro: no weakness no paresthesias  Heme: No petechiae no easy bruising  Msk: No joint pain no swelling  Skin: No rash no itching        MEDICATIONS  (STANDING):  dextrose 5%. 1000 milliLiter(s) (50 mL/Hr) IV Continuous <Continuous>  dextrose 5%. 1000 milliLiter(s) (50 mL/Hr) IV Continuous <Continuous>  dextrose 50% Injectable 12.5 Gram(s) IV Push once  dextrose 50% Injectable 25 Gram(s) IV Push once  dextrose 50% Injectable 25 Gram(s) IV Push once  dextrose 50% Injectable 12.5 Gram(s) IV Push once  dextrose 50% Injectable 25 Gram(s) IV Push once  dextrose 50% Injectable 25 Gram(s) IV Push once  diltiazem    milliGRAM(s) Oral daily  insulin lispro (HumaLOG) corrective regimen sliding scale   SubCutaneous three times a day before meals  insulin lispro (HumaLOG) corrective regimen sliding scale   SubCutaneous at bedtime  ketorolac   Injectable 15 milliGRAM(s) IV Push every 6 hours  metoprolol succinate ER 25 milliGRAM(s) Oral daily  pantoprazole    Tablet 40 milliGRAM(s) Oral before breakfast  PARoxetine 20 milliGRAM(s) Oral daily  rivaroxaban 15 milliGRAM(s) Oral every 24 hours  triamcinolone 0.1% Cream 1 Application(s) Topical two times a day    MEDICATIONS  (PRN):  acetaminophen   Tablet .. 650 milliGRAM(s) Oral every 6 hours PRN Temp greater or equal to 38C (100.4F)  artificial  tears Solution 1 Drop(s) Both EYES daily PRN Dry Eyes  dextrose 40% Gel 15 Gram(s) Oral once PRN Blood Glucose LESS THAN 70 milliGRAM(s)/deciLiter  dextrose 40% Gel 15 Gram(s) Oral once PRN Blood Glucose LESS THAN 70 milliGRAM(s)/deciliter  glucagon  Injectable 1 milliGRAM(s) IntraMuscular once PRN Glucose <70 milliGRAM(s)/deciLiter  glucagon  Injectable 1 milliGRAM(s) IntraMuscular once PRN Glucose LESS THAN 70 milligrams/deciliter  morphine  - Injectable 2 milliGRAM(s) IV Push every 4 hours PRN moderate and severe pain  ondansetron Injectable 4 milliGRAM(s) IV Push every 6 hours PRN Nausea        CAPILLARY BLOOD GLUCOSE      POCT Blood Glucose.: 276 mg/dL (21 Oct 2018 09:53)  POCT Blood Glucose.: 220 mg/dL (21 Oct 2018 08:18)  POCT Blood Glucose.: 183 mg/dL (20 Oct 2018 21:21)  POCT Blood Glucose.: 260 mg/dL (20 Oct 2018 17:20)    I&O's Summary      Vital Signs Last 24 Hrs  T(C): 36.5 (21 Oct 2018 04:53), Max: 36.9 (20 Oct 2018 15:36)  T(F): 97.7 (21 Oct 2018 04:53), Max: 98.5 (20 Oct 2018 15:36)  HR: 86 (21 Oct 2018 04:53) (86 - 95)  BP: 160/90 (21 Oct 2018 04:53) (135/87 - 160/90)  BP(mean): --  RR: 17 (21 Oct 2018 04:53) (17 - 18)  SpO2: 97% (21 Oct 2018 04:53) (96% - 98%)    PHYSICAL EXAM:  GENERAL: NAD, well-developed  HEAD:  Atraumatic, Normocephalic  EYES: EOMI, PERRLA, conjunctiva and sclera clear  NECK: Supple, No JVD  CHEST/LUNG: no rhonchi, no wheeze, clear to auscultation bilaterally  HEART: S1 S2; irreg? No rubs or gallop, soft ejection systolic murmur best heard at left sternal border   ABDOMEN: Soft, Nontender, Nondistended; Bowel sounds present  EXTREMITIES:  No clubbing or cyanosis, + Peripheral Pulses,  no edema  PSYCH: AO x 2 - 3 appropriate affect  NEUROLOGY: non-focal, motor and sensory systems intact  SKIN: No rashes   MSK: left elbow bruise/ wrist medial left  knee swollen tender  ROM preserved    LABS:                        13.5   10.23 )-----------( 223      ( 21 Oct 2018 06:26 )             43.1     10-21    137  |  97<L>  |  23  ----------------------------<  244<H>  4.1   |  26  |  0.99    Ca    9.3      21 Oct 2018 06:26    TPro  6.8  /  Alb  3.1<L>  /  TBili  0.9  /  DBili  x   /  AST  17  /  ALT  11  /  AlkPhos  83  10-21      All consultant(s) notes reviewed and care discussed with other providers    Contact Number, Dr Heath [phone]

## 2018-10-21 NOTE — CONSULT NOTE ADULT - PROBLEM SELECTOR RECOMMENDATION 9
Will start  Lantus to  16 units qhs,  Humalog  6   units before each meal in addition to correction scale coverage, monitor FS will FU  Patient counseled for compliance with consistent low carb diet

## 2018-10-21 NOTE — CONSULT NOTE ADULT - ATTENDING COMMENTS
Patient seen and examined, agree with above assessment and plan as transcribed above.    - No LOC  - No CHF  - Echo can be done as an outpt with Kiesha Mcconnell MD, FACC

## 2018-10-21 NOTE — DISCHARGE NOTE ADULT - PROVIDER TOKENS
FREE:[LAST:[Moess],FIRST:[],PHONE:[(   )    -],FAX:[(   )    -],ADDRESS:[Cardiology]] FREE:[LAST:[Moses],FIRST:[],PHONE:[(   )    -],FAX:[(   )    -],ADDRESS:[Cardiology]],FREE:[LAST:[Primary Care Provider],PHONE:[(   )    -],FAX:[(   )    -]]

## 2018-10-21 NOTE — PHYSICAL THERAPY INITIAL EVALUATION ADULT - PATIENT PROFILE REVIEW, REHAB EVAL
No Formal Activity Order in the computer; spoke with JAYLEN Berrios prior to PT evaluation--> Pt OK for PT consult/OOB activity/yes

## 2018-10-21 NOTE — DISCHARGE NOTE ADULT - CARE PROVIDER_API CALL
Dr. Moses  Cardiology  Phone: (   )    -  Fax: (   )    - Dr. Moses  Cardiology  Phone: (   )    -  Fax: (   )    -    Primary Care Provider,   Phone: (   )    -  Fax: (   )    -

## 2018-10-21 NOTE — DISCHARGE NOTE ADULT - HOSPITAL COURSE
Pt is a 87 yo F w/ hx Afib (on DOAC), DM, HTN  p/w fall. 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  In ED she had multiple xray all neg for fracture she received morphine for pain. She denies LOC/palpitation/dizziness. She is being admitted for placement     COURSE______ Pt is a 89 yo F w/ hx Afib (on DOAC), DM, HTN  p/w fall. 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  In ED she had multiple xray all neg for fracture she received morphine for pain. She denies LOC/palpitation/dizziness. She is being admitted for placement. Evaluated by physical therapy recommended rehab placement.   Endocrine evaluated patient during admission, insulin during admission, as per attending no insulin upon discharge.    dispo: rehab

## 2018-10-21 NOTE — DISCHARGE NOTE ADULT - MEDICATION SUMMARY - MEDICATIONS TO TAKE
I will START or STAY ON the medications listed below when I get home from the hospital:    acetaminophen 325 mg oral tablet  -- 2 tab(s) by mouth every 6 hours, As needed, Temp greater or equal to 38C (100.4F)  -- Indication: For pain    DilTIAZem Hydrochloride  mg/24 hours oral capsule, extended release  -- 1 cap(s) by mouth once a day  -- Indication: For Hypertension, unspecified type    Xarelto 15 mg oral tablet  -- 1 tab(s) by mouth once a day (in the evening)  -- Indication: For Atrial fibrillation, unspecified type    PARoxetine 20 mg oral tablet  -- 1 tab(s) by mouth once a day  -- Indication: For depression    Januvia 50 mg oral tablet  -- 1 tab(s) by mouth once a day  -- Indication: For diabetes    pioglitazone 30 mg oral tablet  -- 1 tab(s) by mouth once a day  -- Indication: For diabetes    glipiZIDE 10 mg oral tablet  -- 1 tab(s) by mouth once a day  -- Indication: For diabetes    simvastatin 10 mg oral tablet  -- 1 tab(s) by mouth once a day (at bedtime)  -- Indication: For Hyperlipidemia, unspecified hyperlipidemia type    metoprolol succinate 25 mg oral tablet, extended release  -- 1 tab(s) by mouth once a day  -- Indication: For Hypertension, unspecified type    triamcinolone 0.1% topical cream  -- Apply on skin to affected area on arms, chest, and legs 2 times a day  -- Indication: For rash    hydroCHLOROthiazide 25 mg oral tablet  -- 1 tab(s) by mouth once a day  -- Indication: For Hypertension, unspecified type    Artificial Tears ophthalmic solution  -- 1 drop(s) to each affected eye 2 times a day, As Needed  -- Indication: For dry eye    pantoprazole 40 mg oral delayed release tablet  -- 1 tab(s) by mouth once a day (before a meal)  -- Indication: For GERD

## 2018-10-22 NOTE — PROGRESS NOTE ADULT - SUBJECTIVE AND OBJECTIVE BOX
Patient is a 88y old  Female who presents with a chief complaint of fall (21 Oct 2018 20:14)    SUBJECTIVE / OVERNIGHT EVENTS: no overnight events    ROS:  Resp: No cough no sputum production  CVS: No chest pain no palpitations no orthopnea  GI: no N/V/D  : no dysuria, no hematuria  Neuro: no weakness no paresthesias  Heme: No petechiae no easy bruising  Msk: No joint pain no swelling  Skin: No rash no itching        MEDICATIONS  (STANDING):  dextrose 5%. 1000 milliLiter(s) (50 mL/Hr) IV Continuous <Continuous>  dextrose 5%. 1000 milliLiter(s) (50 mL/Hr) IV Continuous <Continuous>  dextrose 50% Injectable 12.5 Gram(s) IV Push once  dextrose 50% Injectable 25 Gram(s) IV Push once  dextrose 50% Injectable 25 Gram(s) IV Push once  dextrose 50% Injectable 12.5 Gram(s) IV Push once  dextrose 50% Injectable 25 Gram(s) IV Push once  dextrose 50% Injectable 25 Gram(s) IV Push once  diltiazem    milliGRAM(s) Oral daily  insulin glargine Injectable (LANTUS) 16 Unit(s) SubCutaneous at bedtime  insulin lispro (HumaLOG) corrective regimen sliding scale   SubCutaneous three times a day before meals  insulin lispro (HumaLOG) corrective regimen sliding scale   SubCutaneous at bedtime  insulin lispro Injectable (HumaLOG) 6 Unit(s) SubCutaneous three times a day before meals  metoprolol succinate ER 25 milliGRAM(s) Oral daily  pantoprazole    Tablet 40 milliGRAM(s) Oral before breakfast  PARoxetine 20 milliGRAM(s) Oral daily  rivaroxaban 15 milliGRAM(s) Oral every 24 hours  sodium chloride 0.9%. 500 milliLiter(s) (100 mL/Hr) IV Continuous <Continuous>  triamcinolone 0.1% Cream 1 Application(s) Topical two times a day    MEDICATIONS  (PRN):  acetaminophen   Tablet .. 650 milliGRAM(s) Oral every 6 hours PRN Temp greater or equal to 38C (100.4F)  artificial  tears Solution 1 Drop(s) Both EYES daily PRN Dry Eyes  dextrose 40% Gel 15 Gram(s) Oral once PRN Blood Glucose LESS THAN 70 milliGRAM(s)/deciLiter  dextrose 40% Gel 15 Gram(s) Oral once PRN Blood Glucose LESS THAN 70 milliGRAM(s)/deciliter  glucagon  Injectable 1 milliGRAM(s) IntraMuscular once PRN Glucose <70 milliGRAM(s)/deciLiter  glucagon  Injectable 1 milliGRAM(s) IntraMuscular once PRN Glucose LESS THAN 70 milligrams/deciliter  morphine  - Injectable 2 milliGRAM(s) IV Push every 4 hours PRN moderate and severe pain  ondansetron Injectable 4 milliGRAM(s) IV Push every 6 hours PRN Nausea        CAPILLARY BLOOD GLUCOSE      POCT Blood Glucose.: 243 mg/dL (22 Oct 2018 08:21)  POCT Blood Glucose.: 174 mg/dL (21 Oct 2018 21:25)  POCT Blood Glucose.: 222 mg/dL (21 Oct 2018 17:29)  POCT Blood Glucose.: 232 mg/dL (21 Oct 2018 12:22)    I&O's Summary      Vital Signs Last 24 Hrs  T(C): 36.8 (22 Oct 2018 05:04), Max: 36.8 (22 Oct 2018 05:04)  T(F): 98.2 (22 Oct 2018 05:04), Max: 98.2 (22 Oct 2018 05:04)  HR: 76 (22 Oct 2018 05:04) (68 - 97)  BP: 160/90 (22 Oct 2018 05:04) (127/74 - 160/90)  BP(mean): --  RR: 17 (22 Oct 2018 05:04) (17 - 18)  SpO2: 95% (22 Oct 2018 05:04) (95% - 96%)    PHYSICAL EXAM:    GENERAL: NAD, well-developed  HEAD:  Atraumatic, Normocephalic  EYES: EOMI, PERRLA, conjunctiva and sclera clear  NECK: Supple, No JVD  CHEST/LUNG: no rhonchi, no wheeze, clear to auscultation bilaterally  HEART: S1 S2; irreg? No rubs or gallop, soft ejection systolic murmur best heard at left sternal border   ABDOMEN: Soft, Nontender, Nondistended; Bowel sounds present  EXTREMITIES:  No clubbing or cyanosis, + Peripheral Pulses,  no edema  PSYCH: AO x 3 appropriate affect   NEUROLOGY: non-focal, motor and sensory systems intact  SKIN: No rashes   MSK: left elbow bruise/ wrist medial left  knee swollen tender  ROM preserved    LABS:                        13.4   9.75  )-----------( 218      ( 22 Oct 2018 05:12 )             42.0     10-22    136  |  96<L>  |  33<H>  ----------------------------<  245<H>  4.0   |  26  |  1.33<H>    Ca    9.4      22 Oct 2018 05:12  Phos  3.2     10-22  Mg     1.7     10-22    TPro  6.8  /  Alb  3.1<L>  /  TBili  0.9  /  DBili  x   /  AST  17  /  ALT  11  /  AlkPhos  83  10-21      CARDIAC MARKERS ( 22 Oct 2018 05:12 )  x     / x     / 84 u/L / x     / x                All consultant(s) notes reviewed and care discussed with other providers    Contact Number, Dr Heath [phone]

## 2018-10-22 NOTE — PROGRESS NOTE ADULT - SUBJECTIVE AND OBJECTIVE BOX
SUBJECTIVE: Denies CP SOB Palps or syncope      MEDICATIONS  (STANDING):  diltiazem    milliGRAM(s) Oral daily  insulin glargine Injectable (LANTUS) 16 Unit(s) SubCutaneous at bedtime  insulin lispro (HumaLOG) corrective regimen sliding scale   SubCutaneous three times a day before meals  insulin lispro (HumaLOG) corrective regimen sliding scale   SubCutaneous at bedtime  insulin lispro Injectable (HumaLOG) 6 Unit(s) SubCutaneous three times a day before meals  metoprolol succinate ER 25 milliGRAM(s) Oral daily  pantoprazole    Tablet 40 milliGRAM(s) Oral before breakfast  PARoxetine 20 milliGRAM(s) Oral daily  rivaroxaban 15 milliGRAM(s) Oral every 24 hours  sodium chloride 0.9%. 500 milliLiter(s) (100 mL/Hr) IV Continuous <Continuous>  triamcinolone 0.1% Cream 1 Application(s) Topical two times a day    LABS:                        13.4   9.75  )-----------( 218      ( 22 Oct 2018 05:12 )             42.0     136  |  96<L>  |  33<H>  ----------------------------<  245<H>  4.0   |  26  |  1.33<H>    Ca    9.4      22 Oct 2018 05:12  Phos  3.2     10-22  Mg     1.7     10-22    TPro  6.8  /  Alb  3.1<L>  /  TBili  0.9  /  DBili  x   /  AST  17  /  ALT  11  /  AlkPhos  83  10-21    CARDIAC MARKERS ( 22 Oct 2018 05:12 )  x     / x     / 84 u/L / x     / x        PHYSICAL EXAM:  Vital Signs Last 24 Hrs  T(C): 36.7 (22 Oct 2018 12:23), Max: 36.8 (22 Oct 2018 05:04)  T(F): 98.1 (22 Oct 2018 12:23), Max: 98.2 (22 Oct 2018 05:04)  HR: 82 (22 Oct 2018 12:23) (68 - 97)  BP: 110/61 (22 Oct 2018 12:23) (110/61 - 160/90)  RR: 17 (22 Oct 2018 12:23) (17 - 18)  SpO2: 96% (22 Oct 2018 12:23) (95% - 96%)    Cardiovascular:  S1S2 RRR, No JVD  Respiratory: Lungs clear to auscultation, normal effort  Gastrointestinal: Abdomen soft, ND, NT, +BS  Skin: Warm, dry, intact. No rash.  Musculoskeletal: Normal ROM, normal strength  Ext: No C/C/E B/L LE    DIAGNOSTIC DATA    ECG: Afib, left axis deviation, old IWMI age undet, vrate 92 bpm 	    < from: CT Head No Cont (10.20.18 @ 05:02) >  IMPRESSION:    No acute intracranial hemorrhage, mass effect, or displaced calvarial   fracture.   < end of copied text >    ASSESSMENT AND PLAN:    Pt is a 87 yo F w/ hx Afib, on renally-dosed Xarelto for CVA prevention, HLD, DM, HTN  p/w fall.  Denies syncope.  Her Last NST in my office 1/2018 with no ischemia or infarct, EF 59%.  Her last TTE in my office1/2018 with Normal LV systolic fxn, Normal RV function, Mild MR, and moderate diastolic dysfunction.  She denies CP, SOB, palps, near syncope or syncope.     --Cont Xarelto   --PT eval  --Check orthostatics  --CHeck Echo as OP with Dr Lopes

## 2018-10-22 NOTE — PROGRESS NOTE ADULT - PROBLEM SELECTOR PLAN 2
continue Xarelto  HR controlled  cardiology attending evaluation for ? syncope continue Xarelto  HR controlled  cardiology attending evaluation noted  outpatient work up  recent echocardiogram wnl

## 2018-10-22 NOTE — PROGRESS NOTE ADULT - ATTENDING COMMENTS
discussed with patient in detail, all questions answered  discussed with daughter over the phone in detail ([phone] Leigh)

## 2018-10-22 NOTE — CONSULT NOTE ADULT - SUBJECTIVE AND OBJECTIVE BOX
[facility]        Patient is a 88y old  Female who presents with a chief complaint of fall (22 Oct 2018 14:12)      HPI:  Pt is a 89 yo F w/ hx Afib (on DOAC), DM, HTN  p/w fall. Pt states she was getting her mail from the mailroom and doesn't know how she fell but landed on LT side was unable to ambulate managed to call EMS and was brought to ED by ambulance.  In ED she had multiple xray all neg for fracture she received morphine for pain. She denies LOC/palpitation/dizziness. She is being admitted for placement     pt thinks that she tripped on her own feet which made her fall. She denies any prodrome prior to the fall.   She denies any recent illness.   She denies any prior falls.   She reports that her left knee is painful since the fall.   She denies any memory disturbances.   She reports that she lives at [facility].            *****PAST MEDICAL / Surgical  HISTORY:  PAST MEDICAL & SURGICAL HISTORY:  Atrial fibrillation, unspecified type  HLD (hyperlipidemia)  DM2 (diabetes mellitus, type 2)  HTN (hypertension)  Arthritis involving multiple sites  S/P hip replacement           *****FAMILY HISTORY:  FAMILY HISTORY:  No pertinent family history in first degree relatives           *****SOCIAL HISTORY:  Alcohol: None  Smoking: None  teacher.            *****ALLERGIES:   Allergies    No Known Allergies    Intolerances             *****MEDICATIONS: current medication reviewed and documented.   MEDICATIONS  (STANDING):  dextrose 5%. 1000 milliLiter(s) (50 mL/Hr) IV Continuous <Continuous>  dextrose 5%. 1000 milliLiter(s) (50 mL/Hr) IV Continuous <Continuous>  dextrose 50% Injectable 12.5 Gram(s) IV Push once  dextrose 50% Injectable 25 Gram(s) IV Push once  dextrose 50% Injectable 25 Gram(s) IV Push once  dextrose 50% Injectable 12.5 Gram(s) IV Push once  dextrose 50% Injectable 25 Gram(s) IV Push once  dextrose 50% Injectable 25 Gram(s) IV Push once  diltiazem    milliGRAM(s) Oral daily  insulin glargine Injectable (LANTUS) 22 Unit(s) SubCutaneous at bedtime  insulin lispro (HumaLOG) corrective regimen sliding scale   SubCutaneous three times a day before meals  insulin lispro (HumaLOG) corrective regimen sliding scale   SubCutaneous at bedtime  insulin lispro Injectable (HumaLOG) 8 Unit(s) SubCutaneous three times a day before meals  methylPREDNISolone sodium succinate Injectable 20 milliGRAM(s) IV Push once  metoprolol succinate ER 25 milliGRAM(s) Oral daily  pantoprazole    Tablet 40 milliGRAM(s) Oral before breakfast  PARoxetine 20 milliGRAM(s) Oral daily  rivaroxaban 15 milliGRAM(s) Oral every 24 hours  sodium chloride 0.9%. 500 milliLiter(s) (100 mL/Hr) IV Continuous <Continuous>  triamcinolone 0.1% Cream 1 Application(s) Topical two times a day    MEDICATIONS  (PRN):  acetaminophen   Tablet .. 650 milliGRAM(s) Oral every 6 hours PRN Temp greater or equal to 38C (100.4F)  artificial  tears Solution 1 Drop(s) Both EYES daily PRN Dry Eyes  dextrose 40% Gel 15 Gram(s) Oral once PRN Blood Glucose LESS THAN 70 milliGRAM(s)/deciLiter  dextrose 40% Gel 15 Gram(s) Oral once PRN Blood Glucose LESS THAN 70 milliGRAM(s)/deciliter  glucagon  Injectable 1 milliGRAM(s) IntraMuscular once PRN Glucose <70 milliGRAM(s)/deciLiter  glucagon  Injectable 1 milliGRAM(s) IntraMuscular once PRN Glucose LESS THAN 70 milligrams/deciliter  morphine  - Injectable 2 milliGRAM(s) IV Push every 4 hours PRN moderate and severe pain  ondansetron Injectable 4 milliGRAM(s) IV Push every 6 hours PRN Nausea           *****REVIEW OF SYSTEM:  GEN: no fever, no chills, no pain  RESP: no SOB, no cough, no sputum  CVS: no chest pain, no palpitations, no edema  GI: no abdominal pain, no nausea, no vomiting, no constipation, no diarrhea  : no dysurea, no frequency, no hematurea  Neuro: no headache, no dizziness  PSYCH: no anxiety, no depression  Derm : no itching, no rash         *****VITAL SIGNS:  T(C): 36.7 (10-22-18 @ 12:23), Max: 36.8 (10-22-18 @ 05:04)  HR: 82 (10-22-18 @ 12:23) (76 - 97)  BP: 110/61 (10-22-18 @ 12:23) (110/61 - 160/90)  RR: 17 (10-22-18 @ 12:23) (17 - 18)  SpO2: 96% (10-22-18 @ 12:23) (95% - 96%)  Wt(kg): --           *****PHYSICAL EXAM:     Alert oriented x2.5 thought it was oct 21st.  Attention comprehension are fair. Able to name, repeat, read without any difficulty.   Able to follow 3 step commands. able to name the last 3 presidents.   Able to reverse world.   Very verbiose, somewhat tangential.      EOMI fundi not visualized,  VFF to confrontration  No facial asymmetry   Tongue is midline   Palate elevates symmetrically   Moving all 4 ext symmetrically no pronator drift.  Reflexes are diminished in the ankles b/l   sensation is grossly symmetric  Gait : not assessed.  B/L down going toes               *****LAB AND IMAGIN.4   9.75  )-----------( 218      ( 22 Oct 2018 05:12 )             42.0               10-22    136  |  96<L>  |  33<H>  ----------------------------<  245<H>  4.0   |  26  |  1.33<H>    Ca    9.4      22 Oct 2018 05:12  Phos  3.2     10-  Mg     1.7     10-22    TPro  6.8  /  Alb  3.1<L>  /  TBili  0.9  /  DBili  x   /  AST  17  /  ALT  11  /  AlkPhos  83  10-                CARDIAC MARKERS ( 22 Oct 2018 05:12 )  x     / x     / 84 u/L / x     / x                    < from: CT Head No Cont (10.20.18 @ 05:02) >    There is no acute intra-axial or extra-axial hemorrhage. There is no mass   effect or shift of the midline. The basal cisterns arenot effaced. There   is mild cerebral volume loss with prominence of the ventricles and sulci.   There are patchy periventricular frontoparietal white matter   hypodensities, which are nonspecific, however likely secondary to chronic   microvascular ischemic changes. The gray-white matter differentiation is   grossly preserved.    The visualized paranasal sinuses and mastoid air cells are well aerated .   The bony calvarium is intact.  The patient is status post bilateral lens replacement for cataract   disease.    IMPRESSION:    No acute intracranial hemorrhage, mass effect, or displaced calvarial   fracture.         < end of copied text >    [All pertinent recent Imaging reports reviewed]         *****A S S E S S M E N T   A N D   P L A N :     Pt is a 89 yo F w/ hx Afib (on DOAC), DM, HTN  p/w fall. 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  In ED she had multiple xray all neg for fracture she received morphine for pain. She denies LOC/palpitation/dizziness. She is being admitted for placement      Problem/Recommendations 1:  memory disturbances likely age appropriate vs. mild cognitive impairment (* due to microvascular disease)   would get reversible dementia w/u  b12/ folate/tsh/rpr   PT for gait eval.   would get neuropsych testing, as outpt to get a baseline  ct head without any hydrocephalus + microvascular disease    Problem/Recommendations 2:    mild encephalopathy likely related to hospitalization, pain related delirium and renal insufficiency.    ___________________________  Will follow with you.  Thank you,  Betty Garnica MD  Diplomate of the American Board of Neurology and Psychiatry.  Diplomate of the American Board of Vascular Neurology.   [facility] ([facility]), [facility]   Ph: [phone]    Differential diagnosis and plan of care discussed with patient after the evaluation.   Advanced care planning options discussed.   Pain assessed and judicious use of narcotics when appropriate was discussed.  Importance of Fall prevention discussed.  Counseling on Smoking and Alcohol cessation was offered when appropriate.  Counseling on Diet, exercise, and medication compliance was done.     80 minutes spent on the total encounter;  more than 50 % of the visit was spent on counseling  and or coordinating care by the attending physician.    Thank you for allowing me to participate in the care of this audelia patient. Please do not hesitate to call me if you have any questions.     This and subsequent notes were partially created using voice recognition software and will  inherently be subject to errors including those of syntax and sound alike substitutions which may escape proofreading. In such instances original meaning may be extrapolated by contextual derivation.

## 2018-10-22 NOTE — PROGRESS NOTE ADULT - SUBJECTIVE AND OBJECTIVE BOX
Endocrinology Attending Covering for Dr. Alvarez      Chief complaint  Patient is a 88y old  Female who presents with a chief complaint of fall (22 Oct 2018 12:49)   Review of systems  Patient in bed, looks comfortable, no fever,  had no hypoglycemia.    Labs and Fingersticks  CAPILLARY BLOOD GLUCOSE      POCT Blood Glucose.: 206 mg/dL (22 Oct 2018 12:09)  POCT Blood Glucose.: 243 mg/dL (22 Oct 2018 08:21)  POCT Blood Glucose.: 174 mg/dL (21 Oct 2018 21:25)  POCT Blood Glucose.: 222 mg/dL (21 Oct 2018 17:29)        Hemoglobin A1C, Whole Blood: 10.8 <H> (10-21 @ 06:26)    Calcium, Total Serum: 9.4 (10-22 @ 05:12)  Calcium, Total Serum: 9.3 (10-21 @ 06:26)  Albumin, Serum: 3.1 <L> (10-21 @ 06:26)    Alanine Aminotransferase (ALT/SGPT): 11 (10-21 @ 06:26)  Alkaline Phosphatase, Serum: 83 (10-21 @ 06:26)  Aspartate Aminotransferase (AST/SGOT): 17 (10-21 @ 06:26)        10-22    136  |  96<L>  |  33<H>  ----------------------------<  245<H>  4.0   |  26  |  1.33<H>    Ca    9.4      22 Oct 2018 05:12  Phos  3.2     10-22  Mg     1.7     10-22    TPro  6.8  /  Alb  3.1<L>  /  TBili  0.9  /  DBili  x   /  AST  17  /  ALT  11  /  AlkPhos  83  10-21                        13.4   9.75  )-----------( 218      ( 22 Oct 2018 05:12 )             42.0     Medications  MEDICATIONS  (STANDING):  dextrose 5%. 1000 milliLiter(s) (50 mL/Hr) IV Continuous <Continuous>  dextrose 5%. 1000 milliLiter(s) (50 mL/Hr) IV Continuous <Continuous>  dextrose 50% Injectable 12.5 Gram(s) IV Push once  dextrose 50% Injectable 25 Gram(s) IV Push once  dextrose 50% Injectable 25 Gram(s) IV Push once  dextrose 50% Injectable 12.5 Gram(s) IV Push once  dextrose 50% Injectable 25 Gram(s) IV Push once  dextrose 50% Injectable 25 Gram(s) IV Push once  diltiazem    milliGRAM(s) Oral daily  insulin glargine Injectable (LANTUS) 16 Unit(s) SubCutaneous at bedtime  insulin lispro (HumaLOG) corrective regimen sliding scale   SubCutaneous three times a day before meals  insulin lispro (HumaLOG) corrective regimen sliding scale   SubCutaneous at bedtime  insulin lispro Injectable (HumaLOG) 6 Unit(s) SubCutaneous three times a day before meals  methylPREDNISolone sodium succinate Injectable 20 milliGRAM(s) IV Push once  metoprolol succinate ER 25 milliGRAM(s) Oral daily  pantoprazole    Tablet 40 milliGRAM(s) Oral before breakfast  PARoxetine 20 milliGRAM(s) Oral daily  rivaroxaban 15 milliGRAM(s) Oral every 24 hours  sodium chloride 0.9%. 500 milliLiter(s) (100 mL/Hr) IV Continuous <Continuous>  triamcinolone 0.1% Cream 1 Application(s) Topical two times a day      Physical Exam  General: Patient comfortable in bed  Vital Signs Last 12 Hrs  T(F): 98.1 (10-22-18 @ 12:23), Max: 98.2 (10-22-18 @ 05:04)  HR: 82 (10-22-18 @ 12:23) (76 - 82)  BP: 110/61 (10-22-18 @ 12:23) (110/61 - 160/90)  BP(mean): --  RR: 17 (10-22-18 @ 12:23) (17 - 17)  SpO2: 96% (10-22-18 @ 12:23) (95% - 96%)  Neck: No palpable thyroid nodules.  CVS: S1S2, No murmurs  Respiratory: No wheezing, no crepitations  GI: Abdomen soft, bowel sounds positive  Musculoskeletal:  edema lower extremities.   Skin: No skin rashes, no ecchymosis    Diagnostics

## 2018-10-22 NOTE — PROGRESS NOTE ADULT - ATTENDING COMMENTS
Patient seen and examined.  Agree with above.   -Pt. denies LOC  -TTE can be done as outpatient    Sloane Castillo MD

## 2018-10-23 NOTE — PROGRESS NOTE ADULT - PROBLEM SELECTOR PLAN 1
likely osteoarthritis  no evidence of septic arthritis   no h/o gout  still painful  left leg much improved  no further solumedrol IV for now'
likely osteoarthritis  no evidence of septic arthritis   no h/o gout  still painful  will give 20 of solumedrol IV x 1 only
likely osteoarthritis  no evidence of septic arthritis   no h/o gout  doubt steroids with help but will consider low dose solumedrol IV if no response to conservative management

## 2018-10-23 NOTE — PROGRESS NOTE ADULT - PROBLEM SELECTOR PROBLEM 1
Arthritis involving multiple sites
Arthritis involving multiple sites
Type 2 diabetes mellitus with hyperglycemia, unspecified whether long term insulin use
Type 2 diabetes mellitus with hyperglycemia, unspecified whether long term insulin use
Arthritis involving multiple sites

## 2018-10-23 NOTE — PROGRESS NOTE ADULT - PROBLEM SELECTOR PROBLEM 3
Mixed hyperlipidemia
Mixed hyperlipidemia
Type 2 diabetes mellitus with complication, without long-term current use of insulin

## 2018-10-23 NOTE — PROGRESS NOTE ADULT - PROBLEM SELECTOR PLAN 5
continue Diltiazem with hold parameters  resume HCTZ if needed  will continue to monitor

## 2018-10-23 NOTE — PROGRESS NOTE ADULT - PROBLEM SELECTOR PLAN 2
continue Xarelto  HR controlled  cardiology attending evaluation noted  outpatient work up  recent echocardiogram wnl

## 2018-10-23 NOTE — PROGRESS NOTE ADULT - PROBLEM SELECTOR PLAN 7
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

## 2018-10-23 NOTE — PROGRESS NOTE ADULT - SUBJECTIVE AND OBJECTIVE BOX
Endocrinology Attending Covering for Dr. Alvarez      Chief complaint  Patient is a 88y old  Female who presents with a chief complaint of fall (22 Oct 2018 14:12)   Review of systems  Patient in bed, looks comfortable, no fever,  had no hypoglycemia.    Labs and Fingersticks  CAPILLARY BLOOD GLUCOSE      POCT Blood Glucose.: 283 mg/dL (23 Oct 2018 08:31)  POCT Blood Glucose.: 264 mg/dL (22 Oct 2018 22:16)  POCT Blood Glucose.: 107 mg/dL (22 Oct 2018 17:26)  POCT Blood Glucose.: 206 mg/dL (22 Oct 2018 12:09)          Calcium, Total Serum: 9.1 (10-23 @ 06:56)  Calcium, Total Serum: 9.4 (10-22 @ 05:12)          10-23    135  |  95<L>  |  28<H>  ----------------------------<  332<H>  4.5   |  24  |  1.05    Ca    9.1      23 Oct 2018 06:56  Phos  3.3     10-23  Mg     1.8     10-23                          13.2   9.42  )-----------( 233      ( 23 Oct 2018 06:56 )             40.9     Medications  MEDICATIONS  (STANDING):  dextrose 5%. 1000 milliLiter(s) (50 mL/Hr) IV Continuous <Continuous>  dextrose 5%. 1000 milliLiter(s) (50 mL/Hr) IV Continuous <Continuous>  dextrose 50% Injectable 12.5 Gram(s) IV Push once  dextrose 50% Injectable 25 Gram(s) IV Push once  dextrose 50% Injectable 25 Gram(s) IV Push once  dextrose 50% Injectable 12.5 Gram(s) IV Push once  dextrose 50% Injectable 25 Gram(s) IV Push once  dextrose 50% Injectable 25 Gram(s) IV Push once  diltiazem    milliGRAM(s) Oral daily  insulin glargine Injectable (LANTUS) 22 Unit(s) SubCutaneous at bedtime  insulin lispro (HumaLOG) corrective regimen sliding scale   SubCutaneous three times a day before meals  insulin lispro (HumaLOG) corrective regimen sliding scale   SubCutaneous at bedtime  insulin lispro Injectable (HumaLOG) 8 Unit(s) SubCutaneous three times a day before meals  metoprolol succinate ER 25 milliGRAM(s) Oral daily  pantoprazole    Tablet 40 milliGRAM(s) Oral before breakfast  PARoxetine 20 milliGRAM(s) Oral daily  rivaroxaban 15 milliGRAM(s) Oral every 24 hours  sodium chloride 0.9%. 500 milliLiter(s) (100 mL/Hr) IV Continuous <Continuous>  triamcinolone 0.1% Cream 1 Application(s) Topical two times a day      Physical Exam  General: Patient comfortable in bed  Vital Signs Last 12 Hrs  T(F): 97.7 (10-23-18 @ 05:42), Max: 97.7 (10-23-18 @ 05:42)  HR: 95 (10-23-18 @ 08:50) (95 - 100)  BP: 151/89 (10-23-18 @ 08:50) (132/89 - 151/89)  BP(mean): --  RR: 18 (10-23-18 @ 08:50) (18 - 18)  SpO2: 98% (10-23-18 @ 08:50) (98% - 98%)  Neck: No palpable thyroid nodules.  CVS: S1S2, No murmurs  Respiratory: No wheezing, no crepitations  GI: Abdomen soft, bowel sounds positive  Musculoskeletal:  edema lower extremities.   Skin: No skin rashes, no ecchymosis    Diagnostics

## 2018-10-23 NOTE — PROGRESS NOTE ADULT - PROBLEM SELECTOR PROBLEM 2
Hypertension, unspecified type
Atrial fibrillation, unspecified type
Hypertension, unspecified type
Atrial fibrillation, unspecified type
Atrial fibrillation, unspecified type

## 2018-10-23 NOTE — PROGRESS NOTE ADULT - SUBJECTIVE AND OBJECTIVE BOX
[facility]        - Patient seen and examined.  - Today, patient is without complaints.         *****MEDICATIONS: Current medication reviewed and documented.    MEDICATIONS  (STANDING):    MEDICATIONS  (PRN):           ***** REVIEW OF SYSTEM:  GEN: no fever, no chills, no pain  RESP: no SOB, no cough, no sputum  CVS: no chest pain, no palpitations, no edema  GI: no abdominal pain, no nausea, no vomiting, no constipation, no diarrhea  : no dysurea, no frequency  NEURO: no headache, no diziness  PSYCH: no depression, not anxious  Derm : no itching, no rash         ***** VITAL SIGNS:  T(F): 97.7 (10-23-18 @ 13:42), Max: 97.7 (10-23-18 @ 13:42)  HR: 92 (10-23-18 @ 13:42) (92 - 95)  BP: 149/70 (10-23-18 @ 13:42) (149/70 - 151/89)  RR: 18 (10-23-18 @ 13:42) (18 - 18)  SpO2: 98% (10-23-18 @ 13:42) (98% - 98%)  Wt(kg): --  ,   I&O's Summary           *****PHYSICAL EXAM: Alert oriented x2.5 thought it was oct 21st.  Attention comprehension are fair. Able to name, repeat, read without any difficulty.   Able to follow 3 step commands. able to name the last 3 presidents.   Able to reverse world.   Very verbiose, somewhat tangential.      EOMI fundi not visualized,  VFF to confrontration  No facial asymmetry   Tongue is midline   Palate elevates symmetrically   Moving all 4 ext symmetrically no pronator drift.  Reflexes are diminished in the ankles b/l   sensation is grossly symmetric  Gait : not assessed.  B/L down going toes            *****LAB AND IMAGIN.2     )-----------( 233      ( 23 Oct 2018 06:56 )             40.9               10-23    135  |  95<L>  |  28<H>  ----------------------------<  332<H>  4.5   |  24  |  1.05    Ca    9.1      23 Oct 2018 06:56  Phos  3.3     10-  Mg     1.8     10-                           [All pertinent recent Imaging/Reports reviewed]           *****A S S E S S M E N T   A N D   P L A N :      Pt is a 89 yo F w/ hx Afib (on DOAC), DM, HTN  p/w fall. 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  In ED she had multiple xray all neg for fracture she received morphine for pain. She denies LOC/palpitation/dizziness. She is being admitted for placement      Problem/Recommendations 1:  memory disturbances likely age appropriate vs. mild cognitive impairment (* due to microvascular disease)   would get reversible dementia w/u  b12/ folate/tsh/rpr wnl  PT for gait eval.   would get neuropsych testing, as outpt to get a baseline  ct head without any hydrocephalus + microvascular disease    Thank you for allowing me to participate in the care of this patient. Please do not hesitate to call me if you have any  questions.        ________________  Betty Garnica MD  [facility] Emergency Department ([facility]  [phone]     30 minutes spent on total encounter; more than 50 % of the visit was  spent counseling and or  coordinating care by the attending physician.   At the present time, [facility] does not  provide outpatient followup, best to call the your insurance to find a participating provider.  This was explained to you at the time of the visit. Alternatively, if your insurance allows it, you can follow up with a neurologist  Dr. Ion Houston([facility]) [phone] or Dr. Selvin Roblero ( [place]) [phone]

## 2018-10-23 NOTE — PROGRESS NOTE ADULT - PROBLEM SELECTOR PROBLEM 4
Atrial fibrillation, unspecified type
Atrial fibrillation, unspecified type
Hyperlipidemia, unspecified hyperlipidemia type

## 2018-10-23 NOTE — PROGRESS NOTE ADULT - PROBLEM SELECTOR PLAN 6
possibly mild dementia  neurology to see
possibly mild dementia  neurology to see
likely mild dementia  neurology to see   called by me

## 2018-10-23 NOTE — PROGRESS NOTE ADULT - PROBLEM SELECTOR PLAN 3
uncontrolled DM2 with hyperglycemia   endocrinologist evaluation noted  will monitor
uncontrolled DM2 with hyperglycemia   endocrinologist evaluation noted  will monitor
uncontrolled DM2 with hyperglycemia   endocrinologist evaluation  will monitor

## 2018-10-23 NOTE — PROGRESS NOTE ADULT - ASSESSMENT
Pt is a 87 yo F w/ hx Afib (on DOAC), DM, HTN  p/w fall. 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 Currently comfortable no apparent distress
Assessment  DMT2: 88y Female with DM T2 with hyperglycemia on insulin, blood sugars running high, this    had no hypoglycemic episode,  eating meals,  non compliant with low carb diet. A1c-10.8  HTN: Controlled, On med.  HLD; On statin  Afib: rate stable      Plan:   DMT2:  increase   Lantus to  26 units qhs,  Humalog  10  pre -breakfast -6 pre-lunch, and 10 pte-dinner     units before each meal in addition to correction scale coverage, monitor FS will FU  Patient counseled for compliance with consistent low carb diet  HTN: Continue treatment, monitoring, Primary team FU  HLD; Continue statin  A fib: rate stable
Pt is a 87 yo F w/ hx Afib (on DOAC), DM, HTN  p/w fall. 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 Currently comfortable no apparent distress
Assessment  DMT2: 88y Female with DM T2 with hyperglycemia on insulin, blood sugars running high, this -206   had no hypoglycemic episode,  eating meals,  non compliant with low carb diet. A1c-10.8  HTN: Controlled, On med.  HLD; On statin  Afib: rate stable      Plan:   DMT2:  increase   Lantus to  22 units qhs,  Humalog  8   units before each meal in addition to correction scale coverage, monitor FS will FU  Patient counseled for compliance with consistent low carb diet  HTN: Continue treatment, monitoring, Primary team FU  HLD; Continue statin  A fib: rate stable
Pt is a 89 yo F w/ hx Afib (on DOAC), DM, HTN  p/w fall. 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 Currently comfortable no apparent distress

## 2018-10-23 NOTE — PROGRESS NOTE ADULT - SUBJECTIVE AND OBJECTIVE BOX
Patient is a 88y old  Female who presents with a chief complaint of fall (23 Oct 2018 11:28)    SUBJECTIVE / OVERNIGHT EVENTS: no overnight events    ROS:  Resp: No cough no sputum production  CVS: No chest pain no palpitations no orthopnea  GI: no N/V/D  : no dysuria, no hematuria  Neuro: no weakness no paresthesias  Heme: No petechiae no easy bruising  Msk: No joint pain no swelling  Skin: No rash no itching      MEDICATIONS  (STANDING):  dextrose 5%. 1000 milliLiter(s) (50 mL/Hr) IV Continuous <Continuous>  dextrose 5%. 1000 milliLiter(s) (50 mL/Hr) IV Continuous <Continuous>  dextrose 50% Injectable 12.5 Gram(s) IV Push once  dextrose 50% Injectable 25 Gram(s) IV Push once  dextrose 50% Injectable 25 Gram(s) IV Push once  dextrose 50% Injectable 12.5 Gram(s) IV Push once  dextrose 50% Injectable 25 Gram(s) IV Push once  dextrose 50% Injectable 25 Gram(s) IV Push once  diltiazem    milliGRAM(s) Oral daily  insulin glargine Injectable (LANTUS) 22 Unit(s) SubCutaneous at bedtime  insulin lispro (HumaLOG) corrective regimen sliding scale   SubCutaneous three times a day before meals  insulin lispro (HumaLOG) corrective regimen sliding scale   SubCutaneous at bedtime  insulin lispro Injectable (HumaLOG) 8 Unit(s) SubCutaneous three times a day before meals  metoprolol succinate ER 25 milliGRAM(s) Oral daily  pantoprazole    Tablet 40 milliGRAM(s) Oral before breakfast  PARoxetine 20 milliGRAM(s) Oral daily  rivaroxaban 15 milliGRAM(s) Oral every 24 hours  sodium chloride 0.9%. 500 milliLiter(s) (100 mL/Hr) IV Continuous <Continuous>  triamcinolone 0.1% Cream 1 Application(s) Topical two times a day    MEDICATIONS  (PRN):  acetaminophen   Tablet .. 650 milliGRAM(s) Oral every 6 hours PRN Temp greater or equal to 38C (100.4F)  artificial  tears Solution 1 Drop(s) Both EYES daily PRN Dry Eyes  dextrose 40% Gel 15 Gram(s) Oral once PRN Blood Glucose LESS THAN 70 milliGRAM(s)/deciLiter  dextrose 40% Gel 15 Gram(s) Oral once PRN Blood Glucose LESS THAN 70 milliGRAM(s)/deciliter  glucagon  Injectable 1 milliGRAM(s) IntraMuscular once PRN Glucose <70 milliGRAM(s)/deciLiter  glucagon  Injectable 1 milliGRAM(s) IntraMuscular once PRN Glucose LESS THAN 70 milligrams/deciliter  morphine  - Injectable 2 milliGRAM(s) IV Push every 4 hours PRN moderate and severe pain  ondansetron Injectable 4 milliGRAM(s) IV Push every 6 hours PRN Nausea      CAPILLARY BLOOD GLUCOSE      POCT Blood Glucose.: 283 mg/dL (23 Oct 2018 08:31)  POCT Blood Glucose.: 264 mg/dL (22 Oct 2018 22:16)  POCT Blood Glucose.: 107 mg/dL (22 Oct 2018 17:26)  POCT Blood Glucose.: 206 mg/dL (22 Oct 2018 12:09)    I&O's Summary      Vital Signs Last 24 Hrs  T(C): 36.5 (23 Oct 2018 05:42), Max: 36.9 (22 Oct 2018 20:58)  T(F): 97.7 (23 Oct 2018 05:42), Max: 98.5 (22 Oct 2018 20:58)  HR: 95 (23 Oct 2018 08:50) (82 - 100)  BP: 151/89 (23 Oct 2018 08:50) (110/61 - 151/89)  BP(mean): --  RR: 18 (23 Oct 2018 08:50) (17 - 19)  SpO2: 98% (23 Oct 2018 08:50) (96% - 98%)    GENERAL: NAD, well-developed  HEAD:  Atraumatic, Normocephalic  EYES: EOMI, PERRLA, conjunctiva and sclera clear  NECK: Supple, No JVD  CHEST/LUNG: no rhonchi, no wheeze, clear to auscultation bilaterally  HEART: S1 S2; irreg? No rubs or gallop, soft ejection systolic murmur best heard at left sternal border   ABDOMEN: Soft, Nontender, Nondistended; Bowel sounds present  EXTREMITIES:  No clubbing or cyanosis, + Peripheral Pulses,  no edema  PSYCH: AO x 3 appropriate affect   NEUROLOGY: non-focal, motor and sensory systems intact  SKIN: No rashes   MSK: left elbow bruise/ wrist medial left  knee swollen tender  ROM preserved    LABS:                        13.2   9.42  )-----------( 233      ( 23 Oct 2018 06:56 )             40.9     10-23    135  |  95<L>  |  28<H>  ----------------------------<  332<H>  4.5   |  24  |  1.05    Ca    9.1      23 Oct 2018 06:56  Phos  3.3     10-23  Mg     1.8     10-23        CARDIAC MARKERS ( 22 Oct 2018 05:12 )  x     / x     / 84 u/L / x     / x                All consultant(s) notes reviewed and care discussed with other providers    Contact Number, Dr Heath [phone]

## 2018-10-23 NOTE — PROGRESS NOTE ADULT - PROVIDER SPECIALTY LIST ADULT
Cardiology
Cardiology
Endocrinology
Internal Medicine
Internal Medicine
Neurology
Endocrinology
Internal Medicine

## 2018-10-23 NOTE — PROGRESS NOTE ADULT - SUBJECTIVE AND OBJECTIVE BOX
SUBJECTIVE: Denies CP SOB Palps or syncope      MEDICATIONS  (STANDING):  dextrose 5%. 1000 milliLiter(s) (50 mL/Hr) IV Continuous <Continuous>  dextrose 5%. 1000 milliLiter(s) (50 mL/Hr) IV Continuous <Continuous>  dextrose 50% Injectable 12.5 Gram(s) IV Push once  dextrose 50% Injectable 25 Gram(s) IV Push once  dextrose 50% Injectable 25 Gram(s) IV Push once  dextrose 50% Injectable 12.5 Gram(s) IV Push once  dextrose 50% Injectable 25 Gram(s) IV Push once  dextrose 50% Injectable 25 Gram(s) IV Push once  diltiazem    milliGRAM(s) Oral daily  insulin glargine Injectable (LANTUS) 22 Unit(s) SubCutaneous at bedtime  insulin lispro (HumaLOG) corrective regimen sliding scale   SubCutaneous three times a day before meals  insulin lispro (HumaLOG) corrective regimen sliding scale   SubCutaneous at bedtime  insulin lispro Injectable (HumaLOG) 8 Unit(s) SubCutaneous three times a day before meals  metoprolol succinate ER 25 milliGRAM(s) Oral daily  pantoprazole    Tablet 40 milliGRAM(s) Oral before breakfast  PARoxetine 20 milliGRAM(s) Oral daily  rivaroxaban 15 milliGRAM(s) Oral every 24 hours  sodium chloride 0.9%. 500 milliLiter(s) (100 mL/Hr) IV Continuous <Continuous>  triamcinolone 0.1% Cream 1 Application(s) Topical two times a day    MEDICATIONS  (PRN):  acetaminophen   Tablet .. 650 milliGRAM(s) Oral every 6 hours PRN Temp greater or equal to 38C (100.4F)  artificial  tears Solution 1 Drop(s) Both EYES daily PRN Dry Eyes  dextrose 40% Gel 15 Gram(s) Oral once PRN Blood Glucose LESS THAN 70 milliGRAM(s)/deciLiter  dextrose 40% Gel 15 Gram(s) Oral once PRN Blood Glucose LESS THAN 70 milliGRAM(s)/deciliter  glucagon  Injectable 1 milliGRAM(s) IntraMuscular once PRN Glucose <70 milliGRAM(s)/deciLiter  glucagon  Injectable 1 milliGRAM(s) IntraMuscular once PRN Glucose LESS THAN 70 milligrams/deciliter  morphine  - Injectable 2 milliGRAM(s) IV Push every 4 hours PRN moderate and severe pain  ondansetron Injectable 4 milliGRAM(s) IV Push every 6 hours PRN Nausea      LABS:                        13.2   9.42  )-----------( 233      ( 23 Oct 2018 06:56 )             40.9     135  |  95<L>  |  28<H>  ----------------------------<  332<H>  4.5   |  24  |  1.05    Ca    9.1      23 Oct 2018 06:56  Phos  3.3     10-23  Mg     1.8     10-23    Creatinine Trend: 1.05<--, 1.33<--, 0.99<--, 1.11<--     CARDIAC MARKERS ( 22 Oct 2018 05:12 )  x     / x     / 84 u/L / x     / x        PHYSICAL EXAM  Vital Signs Last 24 Hrs  T(C): 36.5 (23 Oct 2018 05:42), Max: 36.9 (22 Oct 2018 20:58)  T(F): 97.7 (23 Oct 2018 05:42), Max: 98.5 (22 Oct 2018 20:58)  HR: 95 (23 Oct 2018 08:50) (82 - 100)  BP: 151/89 (23 Oct 2018 08:50) (132/89 - 151/89)  RR: 18 (23 Oct 2018 08:50) (18 - 19)  SpO2: 98% (23 Oct 2018 08:50) (97% - 98%)    Cardiovascular:  S1S2 RRR, No JVD  Respiratory: Lungs clear to auscultation, normal effort  Gastrointestinal: Abdomen soft, ND, NT, +BS  Skin: Warm, dry, intact. No rash.  Musculoskeletal: Normal ROM, normal strength  Ext: No C/C/E B/L LE    DIAGNOSTIC DATA    ECG: Afib, left axis deviation, old IWMI age undet, vrate 92 bpm 	    < from: CT Head No Cont (10.20.18 @ 05:02) >  IMPRESSION:    No acute intracranial hemorrhage, mass effect, or displaced calvarial   fracture.   < end of copied text >    ASSESSMENT AND PLAN:    Pt is a 87 yo F w/ hx Afib, on renally-dosed Xarelto for CVA prevention, HLD, DM, HTN  p/w fall.  Denies syncope.  Her Last NST in my office 1/2018 with no ischemia or infarct, EF 59%.  Her last TTE in my office1/2018 with Normal LV systolic fxn, Normal RV function, Mild MR, and moderate diastolic dysfunction.  She denies CP, SOB, palps, near syncope or syncope.     --Cont Xarelto   --PT eval recc RAMIREZ at [facility]  --Orthostatics negastive  --CHeck Echo as OP with Dr Lopes

## 2019-01-01 ENCOUNTER — INPATIENT (INPATIENT)
Facility: HOSPITAL | Age: 84
LOS: 4 days | Discharge: NURSING, REHAB OR LONG-TERM CARE | End: 2019-05-01
Attending: ORTHOPAEDIC SURGERY | Admitting: ORTHOPAEDIC SURGERY
Payer: MEDICARE

## 2019-01-01 ENCOUNTER — INPATIENT (INPATIENT)
Facility: HOSPITAL | Age: 84
LOS: 2 days | End: 2019-06-11
Attending: HOSPITALIST | Admitting: HOSPITALIST
Payer: MEDICARE

## 2019-01-01 VITALS
HEART RATE: 160 BPM | SYSTOLIC BLOOD PRESSURE: 193 MMHG | TEMPERATURE: 98 F | RESPIRATION RATE: 20 BRPM | DIASTOLIC BLOOD PRESSURE: 110 MMHG | OXYGEN SATURATION: 99 %

## 2019-01-01 VITALS
SYSTOLIC BLOOD PRESSURE: 120 MMHG | RESPIRATION RATE: 17 BRPM | TEMPERATURE: 98 F | OXYGEN SATURATION: 97 % | DIASTOLIC BLOOD PRESSURE: 70 MMHG | HEART RATE: 83 BPM

## 2019-01-01 VITALS
TEMPERATURE: 104 F | SYSTOLIC BLOOD PRESSURE: 149 MMHG | RESPIRATION RATE: 20 BRPM | DIASTOLIC BLOOD PRESSURE: 92 MMHG | HEART RATE: 162 BPM

## 2019-01-01 VITALS
DIASTOLIC BLOOD PRESSURE: 121 MMHG | HEART RATE: 80 BPM | SYSTOLIC BLOOD PRESSURE: 147 MMHG | RESPIRATION RATE: 16 BRPM | TEMPERATURE: 98 F | OXYGEN SATURATION: 100 %

## 2019-01-01 DIAGNOSIS — R53.2 FUNCTIONAL QUADRIPLEGIA: ICD-10-CM

## 2019-01-01 DIAGNOSIS — I10 ESSENTIAL (PRIMARY) HYPERTENSION: ICD-10-CM

## 2019-01-01 DIAGNOSIS — E11.9 TYPE 2 DIABETES MELLITUS WITHOUT COMPLICATIONS: ICD-10-CM

## 2019-01-01 DIAGNOSIS — Z29.9 ENCOUNTER FOR PROPHYLACTIC MEASURES, UNSPECIFIED: ICD-10-CM

## 2019-01-01 DIAGNOSIS — I48.91 UNSPECIFIED ATRIAL FIBRILLATION: ICD-10-CM

## 2019-01-01 DIAGNOSIS — E11.65 TYPE 2 DIABETES MELLITUS WITH HYPERGLYCEMIA: ICD-10-CM

## 2019-01-01 DIAGNOSIS — S72.144A NONDISPLACED INTERTROCHANTERIC FRACTURE OF RIGHT FEMUR, INITIAL ENCOUNTER FOR CLOSED FRACTURE: ICD-10-CM

## 2019-01-01 DIAGNOSIS — S72.001A FRACTURE OF UNSPECIFIED PART OF NECK OF RIGHT FEMUR, INITIAL ENCOUNTER FOR CLOSED FRACTURE: ICD-10-CM

## 2019-01-01 DIAGNOSIS — K11.7 DISTURBANCES OF SALIVARY SECRETION: ICD-10-CM

## 2019-01-01 DIAGNOSIS — Z51.5 ENCOUNTER FOR PALLIATIVE CARE: ICD-10-CM

## 2019-01-01 DIAGNOSIS — Z96.649 PRESENCE OF UNSPECIFIED ARTIFICIAL HIP JOINT: Chronic | ICD-10-CM

## 2019-01-01 DIAGNOSIS — I61.9 NONTRAUMATIC INTRACEREBRAL HEMORRHAGE, UNSPECIFIED: ICD-10-CM

## 2019-01-01 DIAGNOSIS — R41.0 DISORIENTATION, UNSPECIFIED: ICD-10-CM

## 2019-01-01 DIAGNOSIS — F05 DELIRIUM DUE TO KNOWN PHYSIOLOGICAL CONDITION: ICD-10-CM

## 2019-01-01 DIAGNOSIS — G93.40 ENCEPHALOPATHY, UNSPECIFIED: ICD-10-CM

## 2019-01-01 DIAGNOSIS — R06.82 TACHYPNEA, NOT ELSEWHERE CLASSIFIED: ICD-10-CM

## 2019-01-01 DIAGNOSIS — I63.9 CEREBRAL INFARCTION, UNSPECIFIED: ICD-10-CM

## 2019-01-01 DIAGNOSIS — E87.2 ACIDOSIS: ICD-10-CM

## 2019-01-01 DIAGNOSIS — E78.5 HYPERLIPIDEMIA, UNSPECIFIED: ICD-10-CM

## 2019-01-01 DIAGNOSIS — R41.82 ALTERED MENTAL STATUS, UNSPECIFIED: ICD-10-CM

## 2019-01-01 DIAGNOSIS — Z71.89 OTHER SPECIFIED COUNSELING: ICD-10-CM

## 2019-01-01 DIAGNOSIS — G93.41 METABOLIC ENCEPHALOPATHY: ICD-10-CM

## 2019-01-01 DIAGNOSIS — E11.8 TYPE 2 DIABETES MELLITUS WITH UNSPECIFIED COMPLICATIONS: ICD-10-CM

## 2019-01-01 LAB
ALBUMIN SERPL ELPH-MCNC: 3.6 G/DL — SIGNIFICANT CHANGE UP (ref 3.3–5)
ALBUMIN SERPL ELPH-MCNC: 3.9 G/DL — SIGNIFICANT CHANGE UP (ref 3.3–5)
ALP SERPL-CCNC: 134 U/L — HIGH (ref 40–120)
ALP SERPL-CCNC: 436 U/L — HIGH (ref 40–120)
ALT FLD-CCNC: 28 U/L — SIGNIFICANT CHANGE UP (ref 4–33)
ALT FLD-CCNC: 35 U/L — HIGH (ref 4–33)
ANION GAP SERPL CALC-SCNC: 12 MMO/L — SIGNIFICANT CHANGE UP (ref 7–14)
ANION GAP SERPL CALC-SCNC: 14 MMO/L — SIGNIFICANT CHANGE UP (ref 7–14)
ANION GAP SERPL CALC-SCNC: 15 MMO/L — HIGH (ref 7–14)
ANION GAP SERPL CALC-SCNC: 16 MMO/L — HIGH (ref 7–14)
ANION GAP SERPL CALC-SCNC: 18 MMO/L — HIGH (ref 7–14)
ANION GAP SERPL CALC-SCNC: 23 MMO/L — HIGH (ref 7–14)
ANION GAP SERPL CALC-SCNC: 24 MMO/L — HIGH (ref 7–14)
APAP SERPL-MCNC: < 15 UG/ML — LOW (ref 15–25)
APPEARANCE UR: CLEAR — SIGNIFICANT CHANGE UP
APPEARANCE UR: CLEAR — SIGNIFICANT CHANGE UP
APTT BLD: 27.8 SEC — SIGNIFICANT CHANGE UP (ref 27.5–36.3)
APTT BLD: 28.7 SEC — SIGNIFICANT CHANGE UP (ref 27.5–36.3)
APTT BLD: 29 SEC — SIGNIFICANT CHANGE UP (ref 27.5–36.3)
APTT BLD: 30 SEC — SIGNIFICANT CHANGE UP (ref 27.5–36.3)
AST SERPL-CCNC: 22 U/L — SIGNIFICANT CHANGE UP (ref 4–32)
AST SERPL-CCNC: 72 U/L — HIGH (ref 4–32)
B-OH-BUTYR SERPL-SCNC: 1.2 MMOL/L — HIGH (ref 0–0.4)
B-OH-BUTYR SERPL-SCNC: 3.2 MMOL/L — HIGH (ref 0–0.4)
BACTERIA # UR AUTO: NEGATIVE — SIGNIFICANT CHANGE UP
BACTERIA # UR AUTO: NEGATIVE — SIGNIFICANT CHANGE UP
BACTERIA UR CULT: SIGNIFICANT CHANGE UP
BASE EXCESS BLDV CALC-SCNC: 1.4 MMOL/L — SIGNIFICANT CHANGE UP
BASE EXCESS BLDV CALC-SCNC: 3.6 MMOL/L — SIGNIFICANT CHANGE UP
BASOPHILS # BLD AUTO: 0.03 K/UL — SIGNIFICANT CHANGE UP (ref 0–0.2)
BASOPHILS # BLD AUTO: 0.09 K/UL — SIGNIFICANT CHANGE UP (ref 0–0.2)
BASOPHILS # BLD AUTO: 0.09 K/UL — SIGNIFICANT CHANGE UP (ref 0–0.2)
BASOPHILS NFR BLD AUTO: 0.3 % — SIGNIFICANT CHANGE UP (ref 0–2)
BASOPHILS NFR BLD AUTO: 0.6 % — SIGNIFICANT CHANGE UP (ref 0–2)
BASOPHILS NFR BLD AUTO: 0.8 % — SIGNIFICANT CHANGE UP (ref 0–2)
BILIRUB SERPL-MCNC: 1 MG/DL — SIGNIFICANT CHANGE UP (ref 0.2–1.2)
BILIRUB SERPL-MCNC: 1.3 MG/DL — HIGH (ref 0.2–1.2)
BILIRUB UR-MCNC: NEGATIVE — SIGNIFICANT CHANGE UP
BILIRUB UR-MCNC: NEGATIVE — SIGNIFICANT CHANGE UP
BLD GP AB SCN SERPL QL: NEGATIVE — SIGNIFICANT CHANGE UP
BLOOD GAS VENOUS - CREATININE: 0.87 MG/DL — SIGNIFICANT CHANGE UP (ref 0.5–1.3)
BLOOD GAS VENOUS - CREATININE: 1.02 MG/DL — SIGNIFICANT CHANGE UP (ref 0.5–1.3)
BLOOD GAS VENOUS - FIO2: 21 — SIGNIFICANT CHANGE UP
BLOOD GAS VENOUS - FIO2: 21 — SIGNIFICANT CHANGE UP
BLOOD UR QL VISUAL: HIGH
BLOOD UR QL VISUAL: NEGATIVE — SIGNIFICANT CHANGE UP
BUN SERPL-MCNC: 15 MG/DL — SIGNIFICANT CHANGE UP (ref 7–23)
BUN SERPL-MCNC: 17 MG/DL — SIGNIFICANT CHANGE UP (ref 7–23)
BUN SERPL-MCNC: 18 MG/DL — SIGNIFICANT CHANGE UP (ref 7–23)
BUN SERPL-MCNC: 19 MG/DL — SIGNIFICANT CHANGE UP (ref 7–23)
BUN SERPL-MCNC: 24 MG/DL — HIGH (ref 7–23)
CALCIUM SERPL-MCNC: 10.1 MG/DL — SIGNIFICANT CHANGE UP (ref 8.4–10.5)
CALCIUM SERPL-MCNC: 10.2 MG/DL — SIGNIFICANT CHANGE UP (ref 8.4–10.5)
CALCIUM SERPL-MCNC: 10.3 MG/DL — SIGNIFICANT CHANGE UP (ref 8.4–10.5)
CALCIUM SERPL-MCNC: 9 MG/DL — SIGNIFICANT CHANGE UP (ref 8.4–10.5)
CALCIUM SERPL-MCNC: 9.4 MG/DL — SIGNIFICANT CHANGE UP (ref 8.4–10.5)
CALCIUM SERPL-MCNC: 9.6 MG/DL — SIGNIFICANT CHANGE UP (ref 8.4–10.5)
CALCIUM SERPL-MCNC: 9.7 MG/DL — SIGNIFICANT CHANGE UP (ref 8.4–10.5)
CHLORIDE BLDV-SCNC: 103 MMOL/L — SIGNIFICANT CHANGE UP (ref 96–108)
CHLORIDE BLDV-SCNC: 110 MMOL/L — HIGH (ref 96–108)
CHLORIDE SERPL-SCNC: 102 MMOL/L — SIGNIFICANT CHANGE UP (ref 98–107)
CHLORIDE SERPL-SCNC: 102 MMOL/L — SIGNIFICANT CHANGE UP (ref 98–107)
CHLORIDE SERPL-SCNC: 90 MMOL/L — LOW (ref 98–107)
CHLORIDE SERPL-SCNC: 92 MMOL/L — LOW (ref 98–107)
CHLORIDE SERPL-SCNC: 93 MMOL/L — LOW (ref 98–107)
CHLORIDE SERPL-SCNC: 93 MMOL/L — LOW (ref 98–107)
CHLORIDE SERPL-SCNC: 94 MMOL/L — LOW (ref 98–107)
CHOLEST SERPL-MCNC: 192 MG/DL — SIGNIFICANT CHANGE UP (ref 120–199)
CO2 SERPL-SCNC: 18 MMOL/L — LOW (ref 22–31)
CO2 SERPL-SCNC: 21 MMOL/L — LOW (ref 22–31)
CO2 SERPL-SCNC: 22 MMOL/L — SIGNIFICANT CHANGE UP (ref 22–31)
CO2 SERPL-SCNC: 27 MMOL/L — SIGNIFICANT CHANGE UP (ref 22–31)
CO2 SERPL-SCNC: 28 MMOL/L — SIGNIFICANT CHANGE UP (ref 22–31)
CO2 SERPL-SCNC: 29 MMOL/L — SIGNIFICANT CHANGE UP (ref 22–31)
CO2 SERPL-SCNC: 31 MMOL/L — SIGNIFICANT CHANGE UP (ref 22–31)
COLOR SPEC: YELLOW — SIGNIFICANT CHANGE UP
COLOR SPEC: YELLOW — SIGNIFICANT CHANGE UP
CREAT SERPL-MCNC: 0.81 MG/DL — SIGNIFICANT CHANGE UP (ref 0.5–1.3)
CREAT SERPL-MCNC: 0.9 MG/DL — SIGNIFICANT CHANGE UP (ref 0.5–1.3)
CREAT SERPL-MCNC: 0.95 MG/DL — SIGNIFICANT CHANGE UP (ref 0.5–1.3)
CREAT SERPL-MCNC: 1 MG/DL — SIGNIFICANT CHANGE UP (ref 0.5–1.3)
CREAT SERPL-MCNC: 1.03 MG/DL — SIGNIFICANT CHANGE UP (ref 0.5–1.3)
CREAT SERPL-MCNC: 1.09 MG/DL — SIGNIFICANT CHANGE UP (ref 0.5–1.3)
CREAT SERPL-MCNC: 1.13 MG/DL — SIGNIFICANT CHANGE UP (ref 0.5–1.3)
EOSINOPHIL # BLD AUTO: 0 K/UL — SIGNIFICANT CHANGE UP (ref 0–0.5)
EOSINOPHIL # BLD AUTO: 0.04 K/UL — SIGNIFICANT CHANGE UP (ref 0–0.5)
EOSINOPHIL # BLD AUTO: 0.04 K/UL — SIGNIFICANT CHANGE UP (ref 0–0.5)
EOSINOPHIL NFR BLD AUTO: 0 % — SIGNIFICANT CHANGE UP (ref 0–6)
EOSINOPHIL NFR BLD AUTO: 0.3 % — SIGNIFICANT CHANGE UP (ref 0–6)
EOSINOPHIL NFR BLD AUTO: 0.4 % — SIGNIFICANT CHANGE UP (ref 0–6)
ETHANOL BLD-MCNC: < 10 MG/DL — SIGNIFICANT CHANGE UP
FOLATE SERPL-MCNC: 18.2 NG/ML — SIGNIFICANT CHANGE UP (ref 4.7–20)
GAS PNL BLDV: 141 MMOL/L — SIGNIFICANT CHANGE UP (ref 136–146)
GAS PNL BLDV: 144 MMOL/L — SIGNIFICANT CHANGE UP (ref 136–146)
GLUCOSE BLDV-MCNC: 218 MG/DL — HIGH (ref 70–99)
GLUCOSE BLDV-MCNC: 411 MG/DL — HIGH (ref 70–99)
GLUCOSE SERPL-MCNC: 306 MG/DL — HIGH (ref 70–99)
GLUCOSE SERPL-MCNC: 317 MG/DL — HIGH (ref 70–99)
GLUCOSE SERPL-MCNC: 327 MG/DL — HIGH (ref 70–99)
GLUCOSE SERPL-MCNC: 360 MG/DL — HIGH (ref 70–99)
GLUCOSE SERPL-MCNC: 384 MG/DL — HIGH (ref 70–99)
GLUCOSE SERPL-MCNC: 410 MG/DL — HIGH (ref 70–99)
GLUCOSE SERPL-MCNC: 462 MG/DL — CRITICAL HIGH (ref 70–99)
GLUCOSE UR-MCNC: >1000 — HIGH
GLUCOSE UR-MCNC: >1000 — HIGH
HBA1C BLD-MCNC: 11.6 % — HIGH (ref 4–5.6)
HCO3 BLDV-SCNC: 25 MMOL/L — SIGNIFICANT CHANGE UP (ref 20–27)
HCO3 BLDV-SCNC: 26 MMOL/L — SIGNIFICANT CHANGE UP (ref 20–27)
HCT VFR BLD CALC: 40.5 % — SIGNIFICANT CHANGE UP (ref 34.5–45)
HCT VFR BLD CALC: 43 % — SIGNIFICANT CHANGE UP (ref 34.5–45)
HCT VFR BLD CALC: 45.4 % — HIGH (ref 34.5–45)
HCT VFR BLD CALC: 48.2 % — HIGH (ref 34.5–45)
HCT VFR BLD CALC: 48.5 % — HIGH (ref 34.5–45)
HCT VFR BLD CALC: 55.6 % — HIGH (ref 34.5–45)
HCT VFR BLDV CALC: 48.6 % — HIGH (ref 34.5–45)
HCT VFR BLDV CALC: 52.3 % — HIGH (ref 34.5–45)
HDLC SERPL-MCNC: 45 MG/DL — SIGNIFICANT CHANGE UP (ref 45–65)
HGB BLD-MCNC: 12.6 G/DL — SIGNIFICANT CHANGE UP (ref 11.5–15.5)
HGB BLD-MCNC: 13.2 G/DL — SIGNIFICANT CHANGE UP (ref 11.5–15.5)
HGB BLD-MCNC: 14.3 G/DL — SIGNIFICANT CHANGE UP (ref 11.5–15.5)
HGB BLD-MCNC: 14.8 G/DL — SIGNIFICANT CHANGE UP (ref 11.5–15.5)
HGB BLD-MCNC: 15 G/DL — SIGNIFICANT CHANGE UP (ref 11.5–15.5)
HGB BLD-MCNC: 17.4 G/DL — HIGH (ref 11.5–15.5)
HGB BLDV-MCNC: 15.9 G/DL — HIGH (ref 11.5–15.5)
HGB BLDV-MCNC: 17.1 G/DL — HIGH (ref 11.5–15.5)
HYALINE CASTS # UR AUTO: NEGATIVE — SIGNIFICANT CHANGE UP
IMM GRANULOCYTES NFR BLD AUTO: 0.5 % — SIGNIFICANT CHANGE UP (ref 0–1.5)
IMM GRANULOCYTES NFR BLD AUTO: 0.7 % — SIGNIFICANT CHANGE UP (ref 0–1.5)
IMM GRANULOCYTES NFR BLD AUTO: 0.7 % — SIGNIFICANT CHANGE UP (ref 0–1.5)
INR BLD: 1.07 — SIGNIFICANT CHANGE UP (ref 0.88–1.17)
INR BLD: 1.21 — HIGH (ref 0.88–1.17)
INR BLD: 1.23 — HIGH (ref 0.88–1.17)
INR BLD: 1.26 — HIGH (ref 0.88–1.17)
INR BLD: 1.46 — HIGH (ref 0.88–1.17)
KETONES UR-MCNC: HIGH
KETONES UR-MCNC: NEGATIVE — SIGNIFICANT CHANGE UP
LACTATE BLDV-MCNC: 3.2 MMOL/L — HIGH (ref 0.5–2)
LACTATE BLDV-MCNC: 6 MMOL/L — CRITICAL HIGH (ref 0.5–2)
LEUKOCYTE ESTERASE UR-ACNC: NEGATIVE — SIGNIFICANT CHANGE UP
LEUKOCYTE ESTERASE UR-ACNC: NEGATIVE — SIGNIFICANT CHANGE UP
LIPID PNL WITH DIRECT LDL SERPL: 140 MG/DL — SIGNIFICANT CHANGE UP
LYMPHOCYTES # BLD AUTO: 0.71 K/UL — LOW (ref 1–3.3)
LYMPHOCYTES # BLD AUTO: 1.07 K/UL — SIGNIFICANT CHANGE UP (ref 1–3.3)
LYMPHOCYTES # BLD AUTO: 1.73 K/UL — SIGNIFICANT CHANGE UP (ref 1–3.3)
LYMPHOCYTES # BLD AUTO: 15.4 % — SIGNIFICANT CHANGE UP (ref 13–44)
LYMPHOCYTES # BLD AUTO: 6.4 % — LOW (ref 13–44)
LYMPHOCYTES # BLD AUTO: 6.9 % — LOW (ref 13–44)
MAGNESIUM SERPL-MCNC: 1.4 MG/DL — LOW (ref 1.6–2.6)
MCHC RBC-ENTMCNC: 27.8 PG — SIGNIFICANT CHANGE UP (ref 27–34)
MCHC RBC-ENTMCNC: 28.1 PG — SIGNIFICANT CHANGE UP (ref 27–34)
MCHC RBC-ENTMCNC: 28.1 PG — SIGNIFICANT CHANGE UP (ref 27–34)
MCHC RBC-ENTMCNC: 28.2 PG — SIGNIFICANT CHANGE UP (ref 27–34)
MCHC RBC-ENTMCNC: 28.3 PG — SIGNIFICANT CHANGE UP (ref 27–34)
MCHC RBC-ENTMCNC: 28.3 PG — SIGNIFICANT CHANGE UP (ref 27–34)
MCHC RBC-ENTMCNC: 30.5 % — LOW (ref 32–36)
MCHC RBC-ENTMCNC: 30.7 % — LOW (ref 32–36)
MCHC RBC-ENTMCNC: 31.1 % — LOW (ref 32–36)
MCHC RBC-ENTMCNC: 31.1 % — LOW (ref 32–36)
MCHC RBC-ENTMCNC: 31.3 % — LOW (ref 32–36)
MCHC RBC-ENTMCNC: 31.5 % — LOW (ref 32–36)
MCV RBC AUTO: 89.4 FL — SIGNIFICANT CHANGE UP (ref 80–100)
MCV RBC AUTO: 89.9 FL — SIGNIFICANT CHANGE UP (ref 80–100)
MCV RBC AUTO: 90.3 FL — SIGNIFICANT CHANGE UP (ref 80–100)
MCV RBC AUTO: 90.4 FL — SIGNIFICANT CHANGE UP (ref 80–100)
MCV RBC AUTO: 92 FL — SIGNIFICANT CHANGE UP (ref 80–100)
MCV RBC AUTO: 92.1 FL — SIGNIFICANT CHANGE UP (ref 80–100)
MONOCYTES # BLD AUTO: 0.62 K/UL — SIGNIFICANT CHANGE UP (ref 0–0.9)
MONOCYTES # BLD AUTO: 1.09 K/UL — HIGH (ref 0–0.9)
MONOCYTES # BLD AUTO: 1.16 K/UL — HIGH (ref 0–0.9)
MONOCYTES NFR BLD AUTO: 10.3 % — SIGNIFICANT CHANGE UP (ref 2–14)
MONOCYTES NFR BLD AUTO: 5.6 % — SIGNIFICANT CHANGE UP (ref 2–14)
MONOCYTES NFR BLD AUTO: 7 % — SIGNIFICANT CHANGE UP (ref 2–14)
NEUTROPHILS # BLD AUTO: 13.21 K/UL — HIGH (ref 1.8–7.4)
NEUTROPHILS # BLD AUTO: 8.17 K/UL — HIGH (ref 1.8–7.4)
NEUTROPHILS # BLD AUTO: 9.74 K/UL — HIGH (ref 1.8–7.4)
NEUTROPHILS NFR BLD AUTO: 72.4 % — SIGNIFICANT CHANGE UP (ref 43–77)
NEUTROPHILS NFR BLD AUTO: 84.5 % — HIGH (ref 43–77)
NEUTROPHILS NFR BLD AUTO: 87.2 % — HIGH (ref 43–77)
NITRITE UR-MCNC: NEGATIVE — SIGNIFICANT CHANGE UP
NITRITE UR-MCNC: NEGATIVE — SIGNIFICANT CHANGE UP
NRBC # FLD: 0 K/UL — SIGNIFICANT CHANGE UP (ref 0–0)
PCO2 BLDV: 44 MMHG — SIGNIFICANT CHANGE UP (ref 41–51)
PCO2 BLDV: 45 MMHG — SIGNIFICANT CHANGE UP (ref 41–51)
PH BLDV: 7.38 PH — SIGNIFICANT CHANGE UP (ref 7.32–7.43)
PH BLDV: 7.42 PH — SIGNIFICANT CHANGE UP (ref 7.32–7.43)
PH UR: 6 — SIGNIFICANT CHANGE UP (ref 5–8)
PH UR: 6.5 — SIGNIFICANT CHANGE UP (ref 5–8)
PHOSPHATE SERPL-MCNC: 2.4 MG/DL — LOW (ref 2.5–4.5)
PLATELET # BLD AUTO: 213 K/UL — SIGNIFICANT CHANGE UP (ref 150–400)
PLATELET # BLD AUTO: 228 K/UL — SIGNIFICANT CHANGE UP (ref 150–400)
PLATELET # BLD AUTO: 229 K/UL — SIGNIFICANT CHANGE UP (ref 150–400)
PLATELET # BLD AUTO: 235 K/UL — SIGNIFICANT CHANGE UP (ref 150–400)
PLATELET # BLD AUTO: 378 K/UL — SIGNIFICANT CHANGE UP (ref 150–400)
PLATELET # BLD AUTO: 419 K/UL — HIGH (ref 150–400)
PMV BLD: 11.3 FL — SIGNIFICANT CHANGE UP (ref 7–13)
PMV BLD: 11.8 FL — SIGNIFICANT CHANGE UP (ref 7–13)
PMV BLD: 12.4 FL — SIGNIFICANT CHANGE UP (ref 7–13)
PMV BLD: 12.7 FL — SIGNIFICANT CHANGE UP (ref 7–13)
PMV BLD: 12.8 FL — SIGNIFICANT CHANGE UP (ref 7–13)
PMV BLD: 12.9 FL — SIGNIFICANT CHANGE UP (ref 7–13)
PO2 BLDV: 32 MMHG — LOW (ref 35–40)
PO2 BLDV: 58 MMHG — HIGH (ref 35–40)
POTASSIUM BLDV-SCNC: 3.4 MMOL/L — SIGNIFICANT CHANGE UP (ref 3.4–4.5)
POTASSIUM BLDV-SCNC: 4.2 MMOL/L — SIGNIFICANT CHANGE UP (ref 3.4–4.5)
POTASSIUM SERPL-MCNC: 3.6 MMOL/L — SIGNIFICANT CHANGE UP (ref 3.5–5.3)
POTASSIUM SERPL-MCNC: 3.6 MMOL/L — SIGNIFICANT CHANGE UP (ref 3.5–5.3)
POTASSIUM SERPL-MCNC: 3.7 MMOL/L — SIGNIFICANT CHANGE UP (ref 3.5–5.3)
POTASSIUM SERPL-MCNC: 3.8 MMOL/L — SIGNIFICANT CHANGE UP (ref 3.5–5.3)
POTASSIUM SERPL-MCNC: 4.2 MMOL/L — SIGNIFICANT CHANGE UP (ref 3.5–5.3)
POTASSIUM SERPL-MCNC: 4.8 MMOL/L — SIGNIFICANT CHANGE UP (ref 3.5–5.3)
POTASSIUM SERPL-MCNC: 7.5 MMOL/L — CRITICAL HIGH (ref 3.5–5.3)
POTASSIUM SERPL-SCNC: 3.6 MMOL/L — SIGNIFICANT CHANGE UP (ref 3.5–5.3)
POTASSIUM SERPL-SCNC: 3.6 MMOL/L — SIGNIFICANT CHANGE UP (ref 3.5–5.3)
POTASSIUM SERPL-SCNC: 3.7 MMOL/L — SIGNIFICANT CHANGE UP (ref 3.5–5.3)
POTASSIUM SERPL-SCNC: 3.8 MMOL/L — SIGNIFICANT CHANGE UP (ref 3.5–5.3)
POTASSIUM SERPL-SCNC: 4.2 MMOL/L — SIGNIFICANT CHANGE UP (ref 3.5–5.3)
POTASSIUM SERPL-SCNC: 4.8 MMOL/L — SIGNIFICANT CHANGE UP (ref 3.5–5.3)
POTASSIUM SERPL-SCNC: 7.5 MMOL/L — CRITICAL HIGH (ref 3.5–5.3)
PROT SERPL-MCNC: 7.1 G/DL — SIGNIFICANT CHANGE UP (ref 6–8.3)
PROT SERPL-MCNC: 8.8 G/DL — HIGH (ref 6–8.3)
PROT UR-MCNC: 300 — HIGH
PROT UR-MCNC: >600 — HIGH
PROTHROM AB SERPL-ACNC: 12.2 SEC — SIGNIFICANT CHANGE UP (ref 9.8–13.1)
PROTHROM AB SERPL-ACNC: 13.5 SEC — HIGH (ref 9.8–13.1)
PROTHROM AB SERPL-ACNC: 13.7 SEC — HIGH (ref 9.8–13.1)
PROTHROM AB SERPL-ACNC: 14.5 SEC — HIGH (ref 9.8–13.1)
PROTHROM AB SERPL-ACNC: 16.8 SEC — HIGH (ref 9.8–13.1)
RBC # BLD: 4.53 M/UL — SIGNIFICANT CHANGE UP (ref 3.8–5.2)
RBC # BLD: 4.67 M/UL — SIGNIFICANT CHANGE UP (ref 3.8–5.2)
RBC # BLD: 5.05 M/UL — SIGNIFICANT CHANGE UP (ref 3.8–5.2)
RBC # BLD: 5.27 M/UL — HIGH (ref 3.8–5.2)
RBC # BLD: 5.33 M/UL — HIGH (ref 3.8–5.2)
RBC # BLD: 6.16 M/UL — HIGH (ref 3.8–5.2)
RBC # FLD: 13.8 % — SIGNIFICANT CHANGE UP (ref 10.3–14.5)
RBC # FLD: 13.9 % — SIGNIFICANT CHANGE UP (ref 10.3–14.5)
RBC # FLD: 14 % — SIGNIFICANT CHANGE UP (ref 10.3–14.5)
RBC # FLD: 14 % — SIGNIFICANT CHANGE UP (ref 10.3–14.5)
RBC # FLD: 15.2 % — HIGH (ref 10.3–14.5)
RBC # FLD: 15.6 % — HIGH (ref 10.3–14.5)
RBC CASTS # UR COMP ASSIST: HIGH (ref 0–?)
RBC CASTS # UR COMP ASSIST: SIGNIFICANT CHANGE UP (ref 0–?)
RH IG SCN BLD-IMP: POSITIVE — SIGNIFICANT CHANGE UP
SALICYLATES SERPL-MCNC: < 5 MG/DL — LOW (ref 15–30)
SAO2 % BLDV: 56 % — LOW (ref 60–85)
SAO2 % BLDV: 86.5 % — HIGH (ref 60–85)
SODIUM SERPL-SCNC: 133 MMOL/L — LOW (ref 135–145)
SODIUM SERPL-SCNC: 135 MMOL/L — SIGNIFICANT CHANGE UP (ref 135–145)
SODIUM SERPL-SCNC: 136 MMOL/L — SIGNIFICANT CHANGE UP (ref 135–145)
SODIUM SERPL-SCNC: 137 MMOL/L — SIGNIFICANT CHANGE UP (ref 135–145)
SODIUM SERPL-SCNC: 138 MMOL/L — SIGNIFICANT CHANGE UP (ref 135–145)
SODIUM SERPL-SCNC: 142 MMOL/L — SIGNIFICANT CHANGE UP (ref 135–145)
SODIUM SERPL-SCNC: 143 MMOL/L — SIGNIFICANT CHANGE UP (ref 135–145)
SP GR SPEC: 1.02 — SIGNIFICANT CHANGE UP (ref 1–1.04)
SP GR SPEC: 1.03 — SIGNIFICANT CHANGE UP (ref 1–1.04)
SPECIMEN SOURCE: SIGNIFICANT CHANGE UP
SQUAMOUS # UR AUTO: SIGNIFICANT CHANGE UP
SQUAMOUS # UR AUTO: SIGNIFICANT CHANGE UP
TRIGL SERPL-MCNC: 90 MG/DL — SIGNIFICANT CHANGE UP (ref 10–149)
TROPONIN T, HIGH SENSITIVITY: 94 NG/L — CRITICAL HIGH (ref ?–14)
TROPONIN T, HIGH SENSITIVITY: 97 NG/L — CRITICAL HIGH (ref ?–14)
TSH SERPL-MCNC: 1.3 UIU/ML — SIGNIFICANT CHANGE UP (ref 0.27–4.2)
UROBILINOGEN FLD QL: NORMAL — SIGNIFICANT CHANGE UP
UROBILINOGEN FLD QL: NORMAL — SIGNIFICANT CHANGE UP
VIT B12 SERPL-MCNC: 776 PG/ML — SIGNIFICANT CHANGE UP (ref 200–900)
WBC # BLD: 11.16 K/UL — HIGH (ref 3.8–10.5)
WBC # BLD: 11.27 K/UL — HIGH (ref 3.8–10.5)
WBC # BLD: 13.87 K/UL — HIGH (ref 3.8–10.5)
WBC # BLD: 15.61 K/UL — HIGH (ref 3.8–10.5)
WBC # BLD: 16.14 K/UL — HIGH (ref 3.8–10.5)
WBC # BLD: 17.54 K/UL — HIGH (ref 3.8–10.5)
WBC # FLD AUTO: 11.16 K/UL — HIGH (ref 3.8–10.5)
WBC # FLD AUTO: 11.27 K/UL — HIGH (ref 3.8–10.5)
WBC # FLD AUTO: 13.87 K/UL — HIGH (ref 3.8–10.5)
WBC # FLD AUTO: 15.61 K/UL — HIGH (ref 3.8–10.5)
WBC # FLD AUTO: 16.14 K/UL — HIGH (ref 3.8–10.5)
WBC # FLD AUTO: 17.54 K/UL — HIGH (ref 3.8–10.5)
WBC UR QL: SIGNIFICANT CHANGE UP (ref 0–?)
WBC UR QL: SIGNIFICANT CHANGE UP (ref 0–?)

## 2019-01-01 PROCEDURE — 73562 X-RAY EXAM OF KNEE 3: CPT | Mod: 26,RT

## 2019-01-01 PROCEDURE — 99233 SBSQ HOSP IP/OBS HIGH 50: CPT | Mod: GC

## 2019-01-01 PROCEDURE — 99233 SBSQ HOSP IP/OBS HIGH 50: CPT

## 2019-01-01 PROCEDURE — 73502 X-RAY EXAM HIP UNI 2-3 VIEWS: CPT | Mod: 26,RT

## 2019-01-01 PROCEDURE — 70450 CT HEAD/BRAIN W/O DYE: CPT | Mod: 26

## 2019-01-01 PROCEDURE — 99239 HOSP IP/OBS DSCHRG MGMT >30: CPT | Mod: GC

## 2019-01-01 PROCEDURE — 99223 1ST HOSP IP/OBS HIGH 75: CPT | Mod: GC

## 2019-01-01 PROCEDURE — 71045 X-RAY EXAM CHEST 1 VIEW: CPT | Mod: 26

## 2019-01-01 PROCEDURE — 99223 1ST HOSP IP/OBS HIGH 75: CPT

## 2019-01-01 PROCEDURE — 12345: CPT | Mod: NC,GC

## 2019-01-01 PROCEDURE — 99232 SBSQ HOSP IP/OBS MODERATE 35: CPT | Mod: GC

## 2019-01-01 PROCEDURE — 73552 X-RAY EXAM OF FEMUR 2/>: CPT | Mod: 26,RT

## 2019-01-01 PROCEDURE — 73501 X-RAY EXAM HIP UNI 1 VIEW: CPT | Mod: 26,59,RT

## 2019-01-01 PROCEDURE — 27245 TREAT THIGH FRACTURE: CPT | Mod: RT

## 2019-01-01 PROCEDURE — 93010 ELECTROCARDIOGRAM REPORT: CPT

## 2019-01-01 PROCEDURE — 99222 1ST HOSP IP/OBS MODERATE 55: CPT | Mod: AI,57

## 2019-01-01 RX ORDER — INSULIN GLARGINE 100 [IU]/ML
18 INJECTION, SOLUTION SUBCUTANEOUS AT BEDTIME
Qty: 0 | Refills: 0 | Status: DISCONTINUED | OUTPATIENT
Start: 2019-01-01 | End: 2019-01-01

## 2019-01-01 RX ORDER — DEXTROSE 50 % IN WATER 50 %
25 SYRINGE (ML) INTRAVENOUS ONCE
Qty: 0 | Refills: 0 | Status: DISCONTINUED | OUTPATIENT
Start: 2019-01-01 | End: 2019-01-01

## 2019-01-01 RX ORDER — DEXMEDETOMIDINE HYDROCHLORIDE IN 0.9% SODIUM CHLORIDE 4 UG/ML
25 INJECTION INTRAVENOUS ONCE
Qty: 0 | Refills: 0 | Status: DISCONTINUED | OUTPATIENT
Start: 2019-01-01 | End: 2019-01-01

## 2019-01-01 RX ORDER — OXYCODONE HYDROCHLORIDE 5 MG/1
5 TABLET ORAL EVERY 4 HOURS
Qty: 0 | Refills: 0 | Status: DISCONTINUED | OUTPATIENT
Start: 2019-01-01 | End: 2019-01-01

## 2019-01-01 RX ORDER — DOCUSATE SODIUM 100 MG
1 CAPSULE ORAL
Qty: 0 | Refills: 0 | DISCHARGE
Start: 2019-01-01

## 2019-01-01 RX ORDER — MORPHINE SULFATE 50 MG/1
0 CAPSULE, EXTENDED RELEASE ORAL
Qty: 0 | Refills: 0 | DISCHARGE
Start: 2019-01-01

## 2019-01-01 RX ORDER — DILTIAZEM HCL 120 MG
240 CAPSULE, EXT RELEASE 24 HR ORAL DAILY
Qty: 0 | Refills: 0 | Status: DISCONTINUED | OUTPATIENT
Start: 2019-01-01 | End: 2019-01-01

## 2019-01-01 RX ORDER — INSULIN LISPRO 100/ML
VIAL (ML) SUBCUTANEOUS
Qty: 0 | Refills: 0 | Status: DISCONTINUED | OUTPATIENT
Start: 2019-01-01 | End: 2019-01-01

## 2019-01-01 RX ORDER — DEXTROSE 50 % IN WATER 50 %
12.5 SYRINGE (ML) INTRAVENOUS ONCE
Qty: 0 | Refills: 0 | Status: DISCONTINUED | OUTPATIENT
Start: 2019-01-01 | End: 2019-01-01

## 2019-01-01 RX ORDER — SODIUM CHLORIDE 9 MG/ML
1000 INJECTION, SOLUTION INTRAVENOUS
Refills: 0 | Status: DISCONTINUED | OUTPATIENT
Start: 2019-01-01 | End: 2019-01-01

## 2019-01-01 RX ORDER — ACETAMINOPHEN 500 MG
650 TABLET ORAL EVERY 6 HOURS
Refills: 0 | Status: DISCONTINUED | OUTPATIENT
Start: 2019-01-01 | End: 2019-01-01

## 2019-01-01 RX ORDER — MAGNESIUM HYDROXIDE 400 MG/1
30 TABLET, CHEWABLE ORAL DAILY
Qty: 0 | Refills: 0 | Status: DISCONTINUED | OUTPATIENT
Start: 2019-01-01 | End: 2019-01-01

## 2019-01-01 RX ORDER — ROBINUL 0.2 MG/ML
0.4 INJECTION INTRAMUSCULAR; INTRAVENOUS EVERY 6 HOURS
Refills: 0 | Status: DISCONTINUED | OUTPATIENT
Start: 2019-01-01 | End: 2019-01-01

## 2019-01-01 RX ORDER — MORPHINE SULFATE 50 MG/1
1 CAPSULE, EXTENDED RELEASE ORAL
Refills: 0 | Status: DISCONTINUED | OUTPATIENT
Start: 2019-01-01 | End: 2019-01-01

## 2019-01-01 RX ORDER — FONDAPARINUX SODIUM 2.5 MG/.5ML
1 INJECTION, SOLUTION SUBCUTANEOUS
Qty: 0 | Refills: 0 | DISCHARGE

## 2019-01-01 RX ORDER — OXYCODONE HYDROCHLORIDE 5 MG/1
2.5 TABLET ORAL EVERY 4 HOURS
Qty: 0 | Refills: 0 | Status: DISCONTINUED | OUTPATIENT
Start: 2019-01-01 | End: 2019-01-01

## 2019-01-01 RX ORDER — ASCORBIC ACID 60 MG
500 TABLET,CHEWABLE ORAL DAILY
Qty: 0 | Refills: 0 | Status: DISCONTINUED | OUTPATIENT
Start: 2019-01-01 | End: 2019-01-01

## 2019-01-01 RX ORDER — SODIUM CHLORIDE 9 MG/ML
1000 INJECTION INTRAMUSCULAR; INTRAVENOUS; SUBCUTANEOUS ONCE
Refills: 0 | Status: COMPLETED | OUTPATIENT
Start: 2019-01-01 | End: 2019-01-01

## 2019-01-01 RX ORDER — HALOPERIDOL DECANOATE 100 MG/ML
0.5 INJECTION INTRAMUSCULAR ONCE
Qty: 0 | Refills: 0 | Status: COMPLETED | OUTPATIENT
Start: 2019-01-01 | End: 2019-01-01

## 2019-01-01 RX ORDER — MORPHINE SULFATE 50 MG/1
1.5 CAPSULE, EXTENDED RELEASE ORAL
Qty: 100 | Refills: 0 | Status: DISCONTINUED | OUTPATIENT
Start: 2019-01-01 | End: 2019-01-01

## 2019-01-01 RX ORDER — SODIUM CHLORIDE 9 MG/ML
1000 INJECTION, SOLUTION INTRAVENOUS
Qty: 0 | Refills: 0 | Status: DISCONTINUED | OUTPATIENT
Start: 2019-01-01 | End: 2019-01-01

## 2019-01-01 RX ORDER — PIOGLITAZONE HYDROCHLORIDE 15 MG/1
1 TABLET ORAL
Qty: 0 | Refills: 0 | DISCHARGE

## 2019-01-01 RX ORDER — INSULIN LISPRO 100/ML
8 VIAL (ML) SUBCUTANEOUS
Qty: 0 | Refills: 0 | DISCHARGE

## 2019-01-01 RX ORDER — METOCLOPRAMIDE HCL 10 MG
10 TABLET ORAL ONCE
Refills: 0 | Status: COMPLETED | OUTPATIENT
Start: 2019-01-01 | End: 2019-01-01

## 2019-01-01 RX ORDER — HEPARIN SODIUM 5000 [USP'U]/ML
5000 INJECTION INTRAVENOUS; SUBCUTANEOUS ONCE
Qty: 0 | Refills: 0 | Status: COMPLETED | OUTPATIENT
Start: 2019-01-01 | End: 2019-01-01

## 2019-01-01 RX ORDER — INSULIN LISPRO 100/ML
8 VIAL (ML) SUBCUTANEOUS
Qty: 0 | Refills: 0 | Status: DISCONTINUED | OUTPATIENT
Start: 2019-01-01 | End: 2019-01-01

## 2019-01-01 RX ORDER — OXYCODONE HYDROCHLORIDE 5 MG/1
2.5 TABLET ORAL
Qty: 0 | Refills: 0 | DISCHARGE
Start: 2019-01-01

## 2019-01-01 RX ORDER — ONDANSETRON 8 MG/1
4 TABLET, FILM COATED ORAL ONCE
Qty: 0 | Refills: 0 | Status: COMPLETED | OUTPATIENT
Start: 2019-01-01 | End: 2019-01-01

## 2019-01-01 RX ORDER — SODIUM CHLORIDE 9 MG/ML
1000 INJECTION INTRAMUSCULAR; INTRAVENOUS; SUBCUTANEOUS
Qty: 0 | Refills: 0 | Status: DISCONTINUED | OUTPATIENT
Start: 2019-01-01 | End: 2019-01-01

## 2019-01-01 RX ORDER — ACETAMINOPHEN 500 MG
650 TABLET ORAL EVERY 6 HOURS
Qty: 0 | Refills: 0 | Status: DISCONTINUED | OUTPATIENT
Start: 2019-01-01 | End: 2019-01-01

## 2019-01-01 RX ORDER — MORPHINE SULFATE 50 MG/1
2 CAPSULE, EXTENDED RELEASE ORAL ONCE
Qty: 0 | Refills: 0 | Status: DISCONTINUED | OUTPATIENT
Start: 2019-01-01 | End: 2019-01-01

## 2019-01-01 RX ORDER — PROTHROMBIN COMPLEX CONCENTRATE (HUMAN) 25.5; 16.5; 24; 22; 22; 26 [IU]/ML; [IU]/ML; [IU]/ML; [IU]/ML; [IU]/ML; [IU]/ML
1500 POWDER, FOR SOLUTION INTRAVENOUS ONCE
Refills: 0 | Status: COMPLETED | OUTPATIENT
Start: 2019-01-01 | End: 2019-01-01

## 2019-01-01 RX ORDER — ROBINUL 0.2 MG/ML
0 INJECTION INTRAMUSCULAR; INTRAVENOUS
Qty: 0 | Refills: 0 | DISCHARGE
Start: 2019-01-01

## 2019-01-01 RX ORDER — ACETAMINOPHEN 500 MG
1000 TABLET ORAL ONCE
Refills: 0 | Status: COMPLETED | OUTPATIENT
Start: 2019-01-01 | End: 2019-01-01

## 2019-01-01 RX ORDER — DEXTROSE MONOHYDRATE, SODIUM CHLORIDE, AND POTASSIUM CHLORIDE 50; .745; 4.5 G/1000ML; G/1000ML; G/1000ML
1000 INJECTION, SOLUTION INTRAVENOUS
Refills: 0 | Status: DISCONTINUED | OUTPATIENT
Start: 2019-01-01 | End: 2019-01-01

## 2019-01-01 RX ORDER — ROBINUL 0.2 MG/ML
0.4 INJECTION INTRAMUSCULAR; INTRAVENOUS ONCE
Refills: 0 | Status: COMPLETED | OUTPATIENT
Start: 2019-01-01 | End: 2019-01-01

## 2019-01-01 RX ORDER — CHLORHEXIDINE GLUCONATE 213 G/1000ML
1 SOLUTION TOPICAL ONCE
Qty: 0 | Refills: 0 | Status: DISCONTINUED | OUTPATIENT
Start: 2019-01-01 | End: 2019-01-01

## 2019-01-01 RX ORDER — DOCUSATE SODIUM 100 MG
1 CAPSULE ORAL
Qty: 0 | Refills: 0 | COMMUNITY
Start: 2019-01-01

## 2019-01-01 RX ORDER — SITAGLIPTIN 50 MG/1
1 TABLET, FILM COATED ORAL
Qty: 0 | Refills: 0 | DISCHARGE

## 2019-01-01 RX ORDER — DEXTROSE 50 % IN WATER 50 %
25 SYRINGE (ML) INTRAVENOUS ONCE
Refills: 0 | Status: DISCONTINUED | OUTPATIENT
Start: 2019-01-01 | End: 2019-01-01

## 2019-01-01 RX ORDER — MORPHINE SULFATE 50 MG/1
0.5 CAPSULE, EXTENDED RELEASE ORAL
Qty: 100 | Refills: 0 | Status: DISCONTINUED | OUTPATIENT
Start: 2019-01-01 | End: 2019-01-01

## 2019-01-01 RX ORDER — DILTIAZEM HCL 120 MG
20 CAPSULE, EXT RELEASE 24 HR ORAL
Qty: 125 | Refills: 0 | Status: DISCONTINUED | OUTPATIENT
Start: 2019-01-01 | End: 2019-01-01

## 2019-01-01 RX ORDER — MORPHINE SULFATE 50 MG/1
2 CAPSULE, EXTENDED RELEASE ORAL ONCE
Refills: 0 | Status: DISCONTINUED | OUTPATIENT
Start: 2019-01-01 | End: 2019-01-01

## 2019-01-01 RX ORDER — INSULIN GLARGINE 100 [IU]/ML
22 INJECTION, SOLUTION SUBCUTANEOUS AT BEDTIME
Qty: 0 | Refills: 0 | Status: DISCONTINUED | OUTPATIENT
Start: 2019-01-01 | End: 2019-01-01

## 2019-01-01 RX ORDER — MORPHINE SULFATE 50 MG/1
0.2 CAPSULE, EXTENDED RELEASE ORAL
Qty: 100 | Refills: 0 | Status: DISCONTINUED | OUTPATIENT
Start: 2019-01-01 | End: 2019-01-01

## 2019-01-01 RX ORDER — ENOXAPARIN SODIUM 100 MG/ML
40 INJECTION SUBCUTANEOUS EVERY 24 HOURS
Qty: 0 | Refills: 0 | Status: DISCONTINUED | OUTPATIENT
Start: 2019-01-01 | End: 2019-01-01

## 2019-01-01 RX ORDER — PANTOPRAZOLE SODIUM 20 MG/1
40 TABLET, DELAYED RELEASE ORAL ONCE
Refills: 0 | Status: COMPLETED | OUTPATIENT
Start: 2019-01-01 | End: 2019-01-01

## 2019-01-01 RX ORDER — ASCORBIC ACID 60 MG
1 TABLET,CHEWABLE ORAL
Qty: 0 | Refills: 0 | DISCHARGE
Start: 2019-01-01

## 2019-01-01 RX ORDER — MORPHINE SULFATE 50 MG/1
1 CAPSULE, EXTENDED RELEASE ORAL EVERY 4 HOURS
Refills: 0 | Status: DISCONTINUED | OUTPATIENT
Start: 2019-01-01 | End: 2019-01-01

## 2019-01-01 RX ORDER — INSULIN HUMAN 100 [IU]/ML
3 INJECTION, SOLUTION SUBCUTANEOUS
Qty: 100 | Refills: 0 | Status: DISCONTINUED | OUTPATIENT
Start: 2019-01-01 | End: 2019-01-01

## 2019-01-01 RX ORDER — ACETAMINOPHEN 500 MG
975 TABLET ORAL EVERY 6 HOURS
Qty: 0 | Refills: 0 | Status: DISCONTINUED | OUTPATIENT
Start: 2019-01-01 | End: 2019-01-01

## 2019-01-01 RX ORDER — HALOPERIDOL DECANOATE 100 MG/ML
0.5 INJECTION INTRAMUSCULAR EVERY 6 HOURS
Qty: 0 | Refills: 0 | Status: DISCONTINUED | OUTPATIENT
Start: 2019-01-01 | End: 2019-01-01

## 2019-01-01 RX ORDER — MORPHINE SULFATE 50 MG/1
1 CAPSULE, EXTENDED RELEASE ORAL
Qty: 100 | Refills: 0 | Status: DISCONTINUED | OUTPATIENT
Start: 2019-01-01 | End: 2019-01-01

## 2019-01-01 RX ORDER — CEFAZOLIN SODIUM 1 G
2000 VIAL (EA) INJECTION EVERY 8 HOURS
Qty: 0 | Refills: 0 | Status: COMPLETED | OUTPATIENT
Start: 2019-01-01 | End: 2019-01-01

## 2019-01-01 RX ORDER — INSULIN GLARGINE 100 [IU]/ML
22 INJECTION, SOLUTION SUBCUTANEOUS
Qty: 0 | Refills: 0 | DISCHARGE

## 2019-01-01 RX ORDER — INSULIN GLARGINE 100 [IU]/ML
15 INJECTION, SOLUTION SUBCUTANEOUS AT BEDTIME
Qty: 0 | Refills: 0 | Status: DISCONTINUED | OUTPATIENT
Start: 2019-01-01 | End: 2019-01-01

## 2019-01-01 RX ORDER — SENNA PLUS 8.6 MG/1
2 TABLET ORAL AT BEDTIME
Qty: 0 | Refills: 0 | Status: DISCONTINUED | OUTPATIENT
Start: 2019-01-01 | End: 2019-01-01

## 2019-01-01 RX ORDER — CHLORHEXIDINE GLUCONATE 213 G/1000ML
1 SOLUTION TOPICAL DAILY
Qty: 0 | Refills: 0 | Status: DISCONTINUED | OUTPATIENT
Start: 2019-01-01 | End: 2019-01-01

## 2019-01-01 RX ORDER — INSULIN LISPRO 100/ML
VIAL (ML) SUBCUTANEOUS AT BEDTIME
Qty: 0 | Refills: 0 | Status: DISCONTINUED | OUTPATIENT
Start: 2019-01-01 | End: 2019-01-01

## 2019-01-01 RX ORDER — MORPHINE SULFATE 50 MG/1
2 CAPSULE, EXTENDED RELEASE ORAL
Refills: 0 | Status: DISCONTINUED | OUTPATIENT
Start: 2019-01-01 | End: 2019-01-01

## 2019-01-01 RX ORDER — INSULIN LISPRO 100/ML
5 VIAL (ML) SUBCUTANEOUS
Qty: 0 | Refills: 0 | Status: DISCONTINUED | OUTPATIENT
Start: 2019-01-01 | End: 2019-01-01

## 2019-01-01 RX ORDER — HYDROCHLOROTHIAZIDE 25 MG
25 TABLET ORAL DAILY
Qty: 0 | Refills: 0 | Status: DISCONTINUED | OUTPATIENT
Start: 2019-01-01 | End: 2019-01-01

## 2019-01-01 RX ORDER — METOPROLOL TARTRATE 50 MG
12.5 TABLET ORAL
Qty: 0 | Refills: 0 | DISCHARGE
Start: 2019-01-01

## 2019-01-01 RX ORDER — METOPROLOL TARTRATE 50 MG
1 TABLET ORAL
Qty: 0 | Refills: 0 | COMMUNITY

## 2019-01-01 RX ORDER — INSULIN GLARGINE 100 [IU]/ML
8 INJECTION, SOLUTION SUBCUTANEOUS AT BEDTIME
Refills: 0 | Status: DISCONTINUED | OUTPATIENT
Start: 2019-01-01 | End: 2019-01-01

## 2019-01-01 RX ORDER — DEXTROSE 50 % IN WATER 50 %
12.5 SYRINGE (ML) INTRAVENOUS ONCE
Refills: 0 | Status: DISCONTINUED | OUTPATIENT
Start: 2019-01-01 | End: 2019-01-01

## 2019-01-01 RX ORDER — METOPROLOL TARTRATE 50 MG
12.5 TABLET ORAL
Qty: 0 | Refills: 0 | Status: DISCONTINUED | OUTPATIENT
Start: 2019-01-01 | End: 2019-01-01

## 2019-01-01 RX ORDER — METOPROLOL TARTRATE 50 MG
5 TABLET ORAL ONCE
Refills: 0 | Status: DISCONTINUED | OUTPATIENT
Start: 2019-01-01 | End: 2019-01-01

## 2019-01-01 RX ORDER — DILTIAZEM HCL 120 MG
5 CAPSULE, EXT RELEASE 24 HR ORAL
Qty: 125 | Refills: 0 | Status: DISCONTINUED | OUTPATIENT
Start: 2019-01-01 | End: 2019-01-01

## 2019-01-01 RX ORDER — MAGNESIUM SULFATE 500 MG/ML
2 VIAL (ML) INJECTION ONCE
Refills: 0 | Status: COMPLETED | OUTPATIENT
Start: 2019-01-01 | End: 2019-01-01

## 2019-01-01 RX ORDER — IPRATROPIUM/ALBUTEROL SULFATE 18-103MCG
3 AEROSOL WITH ADAPTER (GRAM) INHALATION EVERY 6 HOURS
Refills: 0 | Status: DISCONTINUED | OUTPATIENT
Start: 2019-01-01 | End: 2019-01-01

## 2019-01-01 RX ORDER — OXYCODONE HYDROCHLORIDE 5 MG/1
1 TABLET ORAL
Qty: 0 | Refills: 0 | DISCHARGE
Start: 2019-01-01

## 2019-01-01 RX ORDER — GLUCAGON INJECTION, SOLUTION 0.5 MG/.1ML
1 INJECTION, SOLUTION SUBCUTANEOUS ONCE
Refills: 0 | Status: DISCONTINUED | OUTPATIENT
Start: 2019-01-01 | End: 2019-01-01

## 2019-01-01 RX ORDER — MORPHINE SULFATE 50 MG/1
2 CAPSULE, EXTENDED RELEASE ORAL
Qty: 100 | Refills: 0 | Status: DISCONTINUED | OUTPATIENT
Start: 2019-01-01 | End: 2019-01-01

## 2019-01-01 RX ORDER — METOPROLOL TARTRATE 50 MG
1 TABLET ORAL
Qty: 0 | Refills: 0 | DISCHARGE

## 2019-01-01 RX ORDER — METOPROLOL TARTRATE 50 MG
5 TABLET ORAL ONCE
Refills: 0 | Status: COMPLETED | OUTPATIENT
Start: 2019-01-01 | End: 2019-01-01

## 2019-01-01 RX ORDER — DEXTROSE 50 % IN WATER 50 %
15 SYRINGE (ML) INTRAVENOUS ONCE
Refills: 0 | Status: DISCONTINUED | OUTPATIENT
Start: 2019-01-01 | End: 2019-01-01

## 2019-01-01 RX ORDER — INSULIN LISPRO 100/ML
2 VIAL (ML) SUBCUTANEOUS EVERY 6 HOURS
Refills: 0 | Status: DISCONTINUED | OUTPATIENT
Start: 2019-01-01 | End: 2019-01-01

## 2019-01-01 RX ORDER — DILTIAZEM HCL 120 MG
15 CAPSULE, EXT RELEASE 24 HR ORAL ONCE
Refills: 0 | Status: COMPLETED | OUTPATIENT
Start: 2019-01-01 | End: 2019-01-01

## 2019-01-01 RX ORDER — INSULIN GLARGINE 100 [IU]/ML
5 INJECTION, SOLUTION SUBCUTANEOUS AT BEDTIME
Refills: 0 | Status: DISCONTINUED | OUTPATIENT
Start: 2019-01-01 | End: 2019-01-01

## 2019-01-01 RX ORDER — ACETAMINOPHEN 500 MG
975 TABLET ORAL EVERY 8 HOURS
Qty: 0 | Refills: 0 | Status: DISCONTINUED | OUTPATIENT
Start: 2019-01-01 | End: 2019-01-01

## 2019-01-01 RX ORDER — RIVAROXABAN 15 MG-20MG
15 KIT ORAL EVERY 24 HOURS
Qty: 0 | Refills: 0 | Status: DISCONTINUED | OUTPATIENT
Start: 2019-01-01 | End: 2019-01-01

## 2019-01-01 RX ORDER — GLUCAGON INJECTION, SOLUTION 0.5 MG/.1ML
1 INJECTION, SOLUTION SUBCUTANEOUS ONCE
Qty: 0 | Refills: 0 | Status: DISCONTINUED | OUTPATIENT
Start: 2019-01-01 | End: 2019-01-01

## 2019-01-01 RX ORDER — DOCUSATE SODIUM 100 MG
100 CAPSULE ORAL THREE TIMES A DAY
Qty: 0 | Refills: 0 | Status: DISCONTINUED | OUTPATIENT
Start: 2019-01-01 | End: 2019-01-01

## 2019-01-01 RX ORDER — METOPROLOL TARTRATE 50 MG
25 TABLET ORAL DAILY
Qty: 0 | Refills: 0 | Status: DISCONTINUED | OUTPATIENT
Start: 2019-01-01 | End: 2019-01-01

## 2019-01-01 RX ORDER — INSULIN GLARGINE 100 [IU]/ML
5 INJECTION, SOLUTION SUBCUTANEOUS ONCE
Refills: 0 | Status: COMPLETED | OUTPATIENT
Start: 2019-01-01 | End: 2019-01-01

## 2019-01-01 RX ORDER — SIMVASTATIN 20 MG/1
10 TABLET, FILM COATED ORAL AT BEDTIME
Qty: 0 | Refills: 0 | Status: DISCONTINUED | OUTPATIENT
Start: 2019-01-01 | End: 2019-01-01

## 2019-01-01 RX ORDER — INSULIN LISPRO 100/ML
VIAL (ML) SUBCUTANEOUS EVERY 6 HOURS
Refills: 0 | Status: DISCONTINUED | OUTPATIENT
Start: 2019-01-01 | End: 2019-01-01

## 2019-01-01 RX ORDER — ONDANSETRON 8 MG/1
4 TABLET, FILM COATED ORAL EVERY 6 HOURS
Qty: 0 | Refills: 0 | Status: DISCONTINUED | OUTPATIENT
Start: 2019-01-01 | End: 2019-01-01

## 2019-01-01 RX ORDER — SITAGLIPTIN 50 MG/1
1 TABLET, FILM COATED ORAL
Qty: 0 | Refills: 0 | COMMUNITY

## 2019-01-01 RX ORDER — PANTOPRAZOLE SODIUM 20 MG/1
40 TABLET, DELAYED RELEASE ORAL
Qty: 0 | Refills: 0 | Status: DISCONTINUED | OUTPATIENT
Start: 2019-01-01 | End: 2019-01-01

## 2019-01-01 RX ORDER — HYDROMORPHONE HYDROCHLORIDE 2 MG/ML
0.25 INJECTION INTRAMUSCULAR; INTRAVENOUS; SUBCUTANEOUS
Qty: 0 | Refills: 0 | Status: DISCONTINUED | OUTPATIENT
Start: 2019-01-01 | End: 2019-01-01

## 2019-01-01 RX ORDER — DEXTROSE 50 % IN WATER 50 %
15 SYRINGE (ML) INTRAVENOUS ONCE
Qty: 0 | Refills: 0 | Status: DISCONTINUED | OUTPATIENT
Start: 2019-01-01 | End: 2019-01-01

## 2019-01-01 RX ORDER — DILTIAZEM HCL 120 MG
20 CAPSULE, EXT RELEASE 24 HR ORAL ONCE
Refills: 0 | Status: COMPLETED | OUTPATIENT
Start: 2019-01-01 | End: 2019-01-01

## 2019-01-01 RX ORDER — IPRATROPIUM/ALBUTEROL SULFATE 18-103MCG
3 AEROSOL WITH ADAPTER (GRAM) INHALATION ONCE
Refills: 0 | Status: COMPLETED | OUTPATIENT
Start: 2019-01-01 | End: 2019-01-01

## 2019-01-01 RX ADMIN — Medication 8 UNIT(S): at 07:51

## 2019-01-01 RX ADMIN — Medication 650 MILLIGRAM(S): at 03:35

## 2019-01-01 RX ADMIN — Medication 20 MG/HR: at 13:06

## 2019-01-01 RX ADMIN — Medication 100 MILLIGRAM(S): at 21:31

## 2019-01-01 RX ADMIN — SIMVASTATIN 10 MILLIGRAM(S): 20 TABLET, FILM COATED ORAL at 21:56

## 2019-01-01 RX ADMIN — Medication 2: at 21:57

## 2019-01-01 RX ADMIN — PANTOPRAZOLE SODIUM 40 MILLIGRAM(S): 20 TABLET, DELAYED RELEASE ORAL at 06:30

## 2019-01-01 RX ADMIN — Medication 12.5 MILLIGRAM(S): at 16:51

## 2019-01-01 RX ADMIN — Medication 8 UNIT(S): at 16:48

## 2019-01-01 RX ADMIN — Medication 650 MILLIGRAM(S): at 15:22

## 2019-01-01 RX ADMIN — SENNA PLUS 2 TABLET(S): 8.6 TABLET ORAL at 21:03

## 2019-01-01 RX ADMIN — Medication 8 UNIT(S): at 18:02

## 2019-01-01 RX ADMIN — Medication 240 MILLIGRAM(S): at 06:00

## 2019-01-01 RX ADMIN — Medication 12.5 MILLIGRAM(S): at 17:24

## 2019-01-01 RX ADMIN — DEXTROSE MONOHYDRATE, SODIUM CHLORIDE, AND POTASSIUM CHLORIDE 125 MILLILITER(S): 50; .745; 4.5 INJECTION, SOLUTION INTRAVENOUS at 00:47

## 2019-01-01 RX ADMIN — Medication 650 MILLIGRAM(S): at 03:10

## 2019-01-01 RX ADMIN — Medication 3 MILLILITER(S): at 16:11

## 2019-01-01 RX ADMIN — MORPHINE SULFATE 1 MILLIGRAM(S): 50 CAPSULE, EXTENDED RELEASE ORAL at 01:06

## 2019-01-01 RX ADMIN — MORPHINE SULFATE 1 MILLIGRAM(S): 50 CAPSULE, EXTENDED RELEASE ORAL at 01:40

## 2019-01-01 RX ADMIN — MORPHINE SULFATE 1 MILLIGRAM(S): 50 CAPSULE, EXTENDED RELEASE ORAL at 17:12

## 2019-01-01 RX ADMIN — HALOPERIDOL DECANOATE 0.5 MILLIGRAM(S): 100 INJECTION INTRAMUSCULAR at 11:42

## 2019-01-01 RX ADMIN — ROBINUL 0.4 MILLIGRAM(S): 0.2 INJECTION INTRAMUSCULAR; INTRAVENOUS at 13:20

## 2019-01-01 RX ADMIN — Medication 3: at 06:16

## 2019-01-01 RX ADMIN — HEPARIN SODIUM 5000 UNIT(S): 5000 INJECTION INTRAVENOUS; SUBCUTANEOUS at 21:02

## 2019-01-01 RX ADMIN — Medication 6: at 21:17

## 2019-01-01 RX ADMIN — OXYCODONE HYDROCHLORIDE 5 MILLIGRAM(S): 5 TABLET ORAL at 03:09

## 2019-01-01 RX ADMIN — Medication 20 MILLIGRAM(S): at 21:08

## 2019-01-01 RX ADMIN — Medication 6: at 08:39

## 2019-01-01 RX ADMIN — OXYCODONE HYDROCHLORIDE 2.5 MILLIGRAM(S): 5 TABLET ORAL at 06:26

## 2019-01-01 RX ADMIN — MORPHINE SULFATE 2 MILLIGRAM(S): 50 CAPSULE, EXTENDED RELEASE ORAL at 20:00

## 2019-01-01 RX ADMIN — PANTOPRAZOLE SODIUM 40 MILLIGRAM(S): 20 TABLET, DELAYED RELEASE ORAL at 08:41

## 2019-01-01 RX ADMIN — Medication 3 MILLILITER(S): at 21:42

## 2019-01-01 RX ADMIN — SODIUM CHLORIDE 1000 MILLILITER(S): 9 INJECTION INTRAMUSCULAR; INTRAVENOUS; SUBCUTANEOUS at 20:29

## 2019-01-01 RX ADMIN — MORPHINE SULFATE 1 MILLIGRAM(S): 50 CAPSULE, EXTENDED RELEASE ORAL at 13:15

## 2019-01-01 RX ADMIN — Medication 3: at 18:46

## 2019-01-01 RX ADMIN — Medication 3: at 13:06

## 2019-01-01 RX ADMIN — Medication 650 MILLIGRAM(S): at 21:56

## 2019-01-01 RX ADMIN — RIVAROXABAN 15 MILLIGRAM(S): KIT at 06:30

## 2019-01-01 RX ADMIN — Medication 1 TABLET(S): at 12:09

## 2019-01-01 RX ADMIN — OXYCODONE HYDROCHLORIDE 2.5 MILLIGRAM(S): 5 TABLET ORAL at 01:56

## 2019-01-01 RX ADMIN — Medication 240 MILLIGRAM(S): at 05:21

## 2019-01-01 RX ADMIN — HALOPERIDOL DECANOATE 0.5 MILLIGRAM(S): 100 INJECTION INTRAMUSCULAR at 09:54

## 2019-01-01 RX ADMIN — INSULIN GLARGINE 15 UNIT(S): 100 INJECTION, SOLUTION SUBCUTANEOUS at 21:51

## 2019-01-01 RX ADMIN — PROTHROMBIN COMPLEX CONCENTRATE (HUMAN) 1500 INTERNATIONAL UNIT(S): 25.5; 16.5; 24; 22; 22; 26 POWDER, FOR SOLUTION INTRAVENOUS at 21:00

## 2019-01-01 RX ADMIN — Medication 4: at 00:44

## 2019-01-01 RX ADMIN — MORPHINE SULFATE 2 MILLIGRAM(S): 50 CAPSULE, EXTENDED RELEASE ORAL at 10:05

## 2019-01-01 RX ADMIN — MORPHINE SULFATE 1.5 MG/HR: 50 CAPSULE, EXTENDED RELEASE ORAL at 11:57

## 2019-01-01 RX ADMIN — Medication 12.5 MILLIGRAM(S): at 18:02

## 2019-01-01 RX ADMIN — MORPHINE SULFATE 2 MILLIGRAM(S): 50 CAPSULE, EXTENDED RELEASE ORAL at 11:39

## 2019-01-01 RX ADMIN — PROTHROMBIN COMPLEX CONCENTRATE (HUMAN) 400 INTERNATIONAL UNIT(S): 25.5; 16.5; 24; 22; 22; 26 POWDER, FOR SOLUTION INTRAVENOUS at 20:51

## 2019-01-01 RX ADMIN — Medication 6: at 07:45

## 2019-01-01 RX ADMIN — ROBINUL 0.4 MILLIGRAM(S): 0.2 INJECTION INTRAMUSCULAR; INTRAVENOUS at 11:23

## 2019-01-01 RX ADMIN — HYDROMORPHONE HYDROCHLORIDE 0.25 MILLIGRAM(S): 2 INJECTION INTRAMUSCULAR; INTRAVENOUS; SUBCUTANEOUS at 15:35

## 2019-01-01 RX ADMIN — Medication 20 MILLIGRAM(S): at 11:55

## 2019-01-01 RX ADMIN — Medication 20 MG/HR: at 01:32

## 2019-01-01 RX ADMIN — Medication 12.5 MILLIGRAM(S): at 06:29

## 2019-01-01 RX ADMIN — HYDROMORPHONE HYDROCHLORIDE 0.25 MILLIGRAM(S): 2 INJECTION INTRAMUSCULAR; INTRAVENOUS; SUBCUTANEOUS at 15:40

## 2019-01-01 RX ADMIN — Medication 1 TABLET(S): at 11:55

## 2019-01-01 RX ADMIN — Medication 3 MILLILITER(S): at 07:27

## 2019-01-01 RX ADMIN — INSULIN GLARGINE 8 UNIT(S): 100 INJECTION, SOLUTION SUBCUTANEOUS at 21:52

## 2019-01-01 RX ADMIN — SODIUM CHLORIDE 125 MILLILITER(S): 9 INJECTION INTRAMUSCULAR; INTRAVENOUS; SUBCUTANEOUS at 20:50

## 2019-01-01 RX ADMIN — Medication 12.5 MILLIGRAM(S): at 05:20

## 2019-01-01 RX ADMIN — Medication 975 MILLIGRAM(S): at 21:03

## 2019-01-01 RX ADMIN — Medication 650 MILLIGRAM(S): at 15:40

## 2019-01-01 RX ADMIN — Medication 3: at 01:05

## 2019-01-01 RX ADMIN — PANTOPRAZOLE SODIUM 40 MILLIGRAM(S): 20 TABLET, DELAYED RELEASE ORAL at 07:33

## 2019-01-01 RX ADMIN — OXYCODONE HYDROCHLORIDE 2.5 MILLIGRAM(S): 5 TABLET ORAL at 00:59

## 2019-01-01 RX ADMIN — SODIUM CHLORIDE 1000 MILLILITER(S): 9 INJECTION INTRAMUSCULAR; INTRAVENOUS; SUBCUTANEOUS at 22:40

## 2019-01-01 RX ADMIN — Medication 12: at 16:49

## 2019-01-01 RX ADMIN — Medication 25 MILLIGRAM(S): at 05:17

## 2019-01-01 RX ADMIN — MORPHINE SULFATE 1 MILLIGRAM(S): 50 CAPSULE, EXTENDED RELEASE ORAL at 09:12

## 2019-01-01 RX ADMIN — MORPHINE SULFATE 1 MILLIGRAM(S): 50 CAPSULE, EXTENDED RELEASE ORAL at 21:01

## 2019-01-01 RX ADMIN — Medication 5 MG/HR: at 21:29

## 2019-01-01 RX ADMIN — HALOPERIDOL DECANOATE 0.5 MILLIGRAM(S): 100 INJECTION INTRAMUSCULAR at 02:28

## 2019-01-01 RX ADMIN — MORPHINE SULFATE 1 MILLIGRAM(S): 50 CAPSULE, EXTENDED RELEASE ORAL at 21:55

## 2019-01-01 RX ADMIN — Medication 3 MILLILITER(S): at 10:31

## 2019-01-01 RX ADMIN — Medication 650 MILLIGRAM(S): at 13:41

## 2019-01-01 RX ADMIN — Medication 4: at 07:53

## 2019-01-01 RX ADMIN — INSULIN GLARGINE 18 UNIT(S): 100 INJECTION, SOLUTION SUBCUTANEOUS at 21:57

## 2019-01-01 RX ADMIN — Medication 1 MILLIGRAM(S): at 23:33

## 2019-01-01 RX ADMIN — Medication 5 MILLIGRAM(S): at 14:14

## 2019-01-01 RX ADMIN — Medication 3 MILLILITER(S): at 05:05

## 2019-01-01 RX ADMIN — MORPHINE SULFATE 1 MILLIGRAM(S): 50 CAPSULE, EXTENDED RELEASE ORAL at 06:00

## 2019-01-01 RX ADMIN — Medication 975 MILLIGRAM(S): at 05:17

## 2019-01-01 RX ADMIN — Medication 4: at 06:39

## 2019-01-01 RX ADMIN — Medication 3 MILLILITER(S): at 22:59

## 2019-01-01 RX ADMIN — RIVAROXABAN 15 MILLIGRAM(S): KIT at 05:04

## 2019-01-01 RX ADMIN — Medication 15 MILLIGRAM(S): at 20:45

## 2019-01-01 RX ADMIN — Medication 8 UNIT(S): at 12:00

## 2019-01-01 RX ADMIN — MORPHINE SULFATE 1 MILLIGRAM(S): 50 CAPSULE, EXTENDED RELEASE ORAL at 16:42

## 2019-01-01 RX ADMIN — INSULIN GLARGINE 22 UNIT(S): 100 INJECTION, SOLUTION SUBCUTANEOUS at 21:31

## 2019-01-01 RX ADMIN — Medication 8 UNIT(S): at 12:08

## 2019-01-01 RX ADMIN — MORPHINE SULFATE 1 MILLIGRAM(S): 50 CAPSULE, EXTENDED RELEASE ORAL at 05:00

## 2019-01-01 RX ADMIN — Medication 3 MILLILITER(S): at 15:47

## 2019-01-01 RX ADMIN — Medication 400 MILLIGRAM(S): at 11:39

## 2019-01-01 RX ADMIN — MORPHINE SULFATE 0.2 MG/HR: 50 CAPSULE, EXTENDED RELEASE ORAL at 12:14

## 2019-01-01 RX ADMIN — Medication 20 MG/HR: at 09:55

## 2019-01-01 RX ADMIN — Medication 650 MILLIGRAM(S): at 09:54

## 2019-01-01 RX ADMIN — HYDROMORPHONE HYDROCHLORIDE 0.25 MILLIGRAM(S): 2 INJECTION INTRAMUSCULAR; INTRAVENOUS; SUBCUTANEOUS at 15:00

## 2019-01-01 RX ADMIN — Medication 240 MILLIGRAM(S): at 16:49

## 2019-01-01 RX ADMIN — Medication 8: at 07:33

## 2019-01-01 RX ADMIN — RIVAROXABAN 15 MILLIGRAM(S): KIT at 05:21

## 2019-01-01 RX ADMIN — CHLORHEXIDINE GLUCONATE 1 APPLICATION(S): 213 SOLUTION TOPICAL at 06:30

## 2019-01-01 RX ADMIN — Medication 85 MILLIMOLE(S): at 13:05

## 2019-01-01 RX ADMIN — Medication 3 MILLILITER(S): at 10:13

## 2019-01-01 RX ADMIN — SIMVASTATIN 10 MILLIGRAM(S): 20 TABLET, FILM COATED ORAL at 21:02

## 2019-01-01 RX ADMIN — Medication 8 UNIT(S): at 11:50

## 2019-01-01 RX ADMIN — Medication 12.5 MILLIGRAM(S): at 06:00

## 2019-01-01 RX ADMIN — Medication 8 UNIT(S): at 07:54

## 2019-01-01 RX ADMIN — Medication 100 MILLIGRAM(S): at 15:40

## 2019-01-01 RX ADMIN — OXYCODONE HYDROCHLORIDE 5 MILLIGRAM(S): 5 TABLET ORAL at 04:05

## 2019-01-01 RX ADMIN — Medication 500 MILLIGRAM(S): at 11:55

## 2019-01-01 RX ADMIN — Medication 20 MG/HR: at 16:34

## 2019-01-01 RX ADMIN — INSULIN HUMAN 3 UNIT(S)/HR: 100 INJECTION, SOLUTION SUBCUTANEOUS at 21:17

## 2019-01-01 RX ADMIN — Medication 100 MILLIGRAM(S): at 05:18

## 2019-01-01 RX ADMIN — MORPHINE SULFATE 1 MG/HR: 50 CAPSULE, EXTENDED RELEASE ORAL at 10:10

## 2019-01-01 RX ADMIN — Medication 5 UNIT(S): at 07:33

## 2019-01-01 RX ADMIN — Medication 10 MILLIGRAM(S): at 21:02

## 2019-01-01 RX ADMIN — Medication 20 MG/HR: at 12:16

## 2019-01-01 RX ADMIN — OXYCODONE HYDROCHLORIDE 2.5 MILLIGRAM(S): 5 TABLET ORAL at 05:05

## 2019-01-01 RX ADMIN — Medication 20 MG/HR: at 04:21

## 2019-01-01 RX ADMIN — ONDANSETRON 4 MILLIGRAM(S): 8 TABLET, FILM COATED ORAL at 14:30

## 2019-01-01 RX ADMIN — Medication 5 UNIT(S): at 16:49

## 2019-01-01 RX ADMIN — Medication 100 MILLIGRAM(S): at 06:30

## 2019-01-01 RX ADMIN — Medication 500 MILLIGRAM(S): at 12:09

## 2019-01-01 RX ADMIN — SIMVASTATIN 10 MILLIGRAM(S): 20 TABLET, FILM COATED ORAL at 21:31

## 2019-01-01 RX ADMIN — Medication 100 MILLIGRAM(S): at 23:00

## 2019-01-01 RX ADMIN — RIVAROXABAN 15 MILLIGRAM(S): KIT at 06:22

## 2019-01-01 RX ADMIN — Medication 25 MILLIGRAM(S): at 05:18

## 2019-01-01 RX ADMIN — MORPHINE SULFATE 1 MILLIGRAM(S): 50 CAPSULE, EXTENDED RELEASE ORAL at 07:43

## 2019-01-01 RX ADMIN — Medication 100 MILLIGRAM(S): at 21:56

## 2019-01-01 RX ADMIN — Medication 50 GRAM(S): at 09:55

## 2019-01-01 RX ADMIN — Medication 100 MILLIGRAM(S): at 15:22

## 2019-01-01 RX ADMIN — Medication 100 MILLIGRAM(S): at 01:38

## 2019-01-01 RX ADMIN — Medication 4: at 11:50

## 2019-01-01 RX ADMIN — HYDROMORPHONE HYDROCHLORIDE 0.25 MILLIGRAM(S): 2 INJECTION INTRAMUSCULAR; INTRAVENOUS; SUBCUTANEOUS at 14:30

## 2019-01-01 RX ADMIN — MORPHINE SULFATE 2 MILLIGRAM(S): 50 CAPSULE, EXTENDED RELEASE ORAL at 19:59

## 2019-01-01 RX ADMIN — HALOPERIDOL DECANOATE 0.5 MILLIGRAM(S): 100 INJECTION INTRAMUSCULAR at 19:42

## 2019-01-01 RX ADMIN — Medication 650 MILLIGRAM(S): at 21:31

## 2019-01-01 RX ADMIN — Medication 2: at 12:08

## 2019-01-01 RX ADMIN — Medication 240 MILLIGRAM(S): at 06:30

## 2019-01-01 RX ADMIN — Medication 6: at 16:25

## 2019-01-01 RX ADMIN — MORPHINE SULFATE 2 MILLIGRAM(S): 50 CAPSULE, EXTENDED RELEASE ORAL at 18:00

## 2019-01-01 RX ADMIN — Medication 20 MILLIGRAM(S): at 21:31

## 2019-01-01 RX ADMIN — Medication 650 MILLIGRAM(S): at 07:33

## 2019-01-01 RX ADMIN — MORPHINE SULFATE 1 MILLIGRAM(S): 50 CAPSULE, EXTENDED RELEASE ORAL at 13:45

## 2019-01-01 RX ADMIN — Medication 240 MILLIGRAM(S): at 05:18

## 2019-01-01 RX ADMIN — INSULIN GLARGINE 5 UNIT(S): 100 INJECTION, SOLUTION SUBCUTANEOUS at 01:12

## 2019-01-01 RX ADMIN — PANTOPRAZOLE SODIUM 40 MILLIGRAM(S): 20 TABLET, DELAYED RELEASE ORAL at 20:46

## 2019-01-09 NOTE — ED BEHAVIORAL HEALTH ASSESSMENT NOTE - NS ED BHA PLAN TR REFERRANT YN
SUBJECTIVE:  Jaydon Jj is a 4 year old male who is accompanied by with Father.    Chief Complaint   Patient presents with   • Follow-up     ear recheck, right eye pink today       Conjunctivitis    The current episode started today. The onset was gradual. The problem occurs continuously. The problem has been gradually worsening. The problem is mild. Nothing aggravates the symptoms. Associated symptoms include congestion, rhinorrhea and eye discharge. Pertinent negatives include no fever, no abdominal pain, no constipation, no diarrhea, no ear discharge, no ear pain, no sore throat, no cough, no wheezing, no rash, no eye pain and no eye redness. He has been eating and drinking normally. Urine output has been normal. There were sick contacts at .       Review of Systems   Constitutional: Negative for activity change, appetite change and fever.   HENT: Positive for congestion and rhinorrhea. Negative for ear discharge, ear pain and sore throat.    Eyes: Positive for discharge. Negative for pain and redness.   Respiratory: Negative for cough and wheezing.    Cardiovascular: Negative.    Gastrointestinal: Negative for abdominal pain, constipation and diarrhea.   Genitourinary: Negative.    Musculoskeletal: Negative.    Skin: Negative for rash.   Neurological: Negative.    Hematological: Negative.    Psychiatric/Behavioral: Negative.      Current Outpatient Medications   Medication Sig Dispense Refill   • amoxicillin (AMOXIL) 400 MG/5ML suspension Take 400 mg by mouth 2 times daily.     • trimethoprim-polymyxin b (POLYTRIM) ophthalmic solution Instill1 gtt in each eye bid 1 Bottle 0     No current facility-administered medications for this visit.        Patient's medications, allergies, past medical, surgical, social and family histories were reviewed and updated as appropriate.    OBJECTIVE:  Visit Vitals  Temp 98.2 °F (36.8 °C) (Temporal)   Ht 3' 1.1\" (0.942 m)   Wt 15.4 kg (33 lb 13.5 oz)   BMI 17.29  kg/m²     Physical Exam   Constitutional: He appears well-developed. He is active.   HENT:   Right Ear: Tympanic membrane normal.   Left Ear: Tympanic membrane normal.   Nose: No nasal discharge.   Mouth/Throat: Mucous membranes are moist. No tonsillar exudate. Oropharynx is clear. Pharynx is normal.   Eyes: EOM are normal. Pupils are equal, round, and reactive to light. Right eye exhibits discharge and erythema. Left eye exhibits erythema. Left eye exhibits no discharge.   Neck: Neck supple. No neck adenopathy.   Cardiovascular: Regular rhythm, S1 normal and S2 normal.   No murmur heard.  Pulmonary/Chest: Effort normal. No respiratory distress. He has no wheezes. He has no rales.   Abdominal: Full and soft. There is no hepatosplenomegaly. There is no tenderness.   Musculoskeletal: Normal range of motion.   Neurological: He is alert.   Skin: Skin is moist.       ASSESSMENT/PLAN:  Jaydon was seen today for follow-up.    Diagnoses and all orders for this visit:    Down syndrome    Acute bacterial conjunctivitis of both eyes    Viral URI    Atrioventricular canal (AVC)    Hypothyroidism, congenital    Other orders  -     trimethoprim-polymyxin b (POLYTRIM) ophthalmic solution; Instill1 gtt in each eye bid        Instructed to call if problem worsens or does not improve within the next 24 hours otherwise follow-up.     The patient and father indicated understanding of the diagnosis and agreed with the plan of care.      Deepa Wolfe MD     No

## 2019-04-26 NOTE — ED PROVIDER NOTE - CLINICAL SUMMARY MEDICAL DECISION MAKING FREE TEXT BOX
89yo male atrial fibrillation on Xarelto presenting with R hip pain s/p mechanical fall, will obtain XR, CT head, labs, reassess. Emily Chambers DO

## 2019-04-26 NOTE — ED PROVIDER NOTE - ATTENDING CONTRIBUTION TO CARE
Locurto   pt s/p fall (rolled out of bed)  ?head injury  no vomiting no chest pain  no dizziness  unable to stand due to rt hip pain    exam  NCAT  moves all ext well except rt leg due to pain  no rib tenderness no abd tenderness  RLE ext rotated   card irreg  HR 90 (h/o afib)    Plain film  hip fx rt   CT head performed (pt on ae=relto)  no bleed   Pt seen by ortho  to be admitted  NPO after MN

## 2019-04-26 NOTE — H&P ADULT - HISTORY OF PRESENT ILLNESS
89y Female hx of afib on xarelto, DM [facility] pain and inability to ambulate after mechanical fall from bed today. She has a history of left hip fracture s/p IMN by Dr. Vigil in 3 years ago.  Patient denies headstrike or LOC. Patient denies radiation of pain. Patient denies numbness/tingling/burning in the RLE. No other bone/joint complaints. Patient is a community ambulator    PAST MEDICAL & SURGICAL HISTORY:  Atrial fibrillation, unspecified type  HLD (hyperlipidemia)  DM2 (diabetes mellitus, type 2)  HTN (hypertension)  Arthritis involving multiple sites  S/P hip replacement    MEDICATIONS  (STANDING):    MEDICATIONS  (PRN):    Allergies    No Known Allergies    Intolerances                              14.3   15.61 )-----------( 235      ( 26 Apr 2019 17:50 )             45.4     04-26    133<L>  |  90<L>  |  19  ----------------------------<  462<HH>  3.6   |  29  |  1.09    Ca    10.1      26 Apr 2019 17:50    TPro  7.1  /  Alb  3.6  /  TBili  1.0  /  DBili  x   /  AST  22  /  ALT  35<H>  /  AlkPhos  134<H>  04-26    PT/INR - ( 26 Apr 2019 17:50 )   PT: 13.7 SEC;   INR: 1.23          PTT - ( 26 Apr 2019 17:50 )  PTT:27.8 SEC    T(C): 36.4 (04-26-19 @ 15:59), Max: 36.4 (04-26-19 @ 15:59)  HR: 75 (04-26-19 @ 18:00) (75 - 80)  BP: 168/85 (04-26-19 @ 18:00) (147/121 - 168/85)  RR: 14 (04-26-19 @ 18:00) (14 - 16)  SpO2: 100% (04-26-19 @ 18:00) (100% - 100%)  Wt(kg): --    PE   RLE:  Skin intact; No ecchymosis/soft tissue swelling  Compartments soft; + TTP about hip. No TTP to knee/leg/ankle/foot   ROM lmited 2/2 pain   Unable to SLR; + Log Roll/Heel Strike  Motor intact GS/TA/FHL/EHL  SILT L2-S1  DP/PT pulses 2+    LLE/BUE:   No bony TTP; Good ROM w/o pain; Exam Unremarkable    Imaging:  XR demonstrating R femoral intertrochanteric  fracture    89y Female with R. intertroch fracture  - Pain control  - NPO/IVF  - [place] catheter  - CBC/BMP/Coags/UA/T+S x2  - EKG/CXR  - Medical clearance pending  - Hold Xarelto  - Plan for OR for ORIF tomorrow AM  - consent in chart 89y Female hx of afib on xarelto, [facility] pain and inability to ambulate after mechanical fall from bed today. She has a history of left hip fracture s/p IMN by Dr. Vigil in 3 years ago.  Patient denies headstrike or LOC. Patient denies radiation of pain. Patient denies numbness/tingling/burning in the RLE. No other bone/joint complaints. Patient is a community ambulator    PAST MEDICAL & SURGICAL HISTORY:  Atrial fibrillation, unspecified type  HLD (hyperlipidemia)  DM2 (diabetes mellitus, type 2)  HTN (hypertension)  Arthritis involving multiple sites  S/P hip replacement    MEDICATIONS  (STANDING):    MEDICATIONS  (PRN):    Allergies    No Known Allergies    Intolerances                              14.3   15.61 )-----------( 235      ( 26 Apr 2019 17:50 )             45.4     04-26    133<L>  |  90<L>  |  19  ----------------------------<  462<HH>  3.6   |  29  |  1.09    Ca    10.1      26 Apr 2019 17:50    TPro  7.1  /  Alb  3.6  /  TBili  1.0  /  DBili  x   /  AST  22  /  ALT  35<H>  /  AlkPhos  134<H>  04-26    PT/INR - ( 26 Apr 2019 17:50 )   PT: 13.7 SEC;   INR: 1.23          PTT - ( 26 Apr 2019 17:50 )  PTT:27.8 SEC    T(C): 36.4 (04-26-19 @ 15:59), Max: 36.4 (04-26-19 @ 15:59)  HR: 75 (04-26-19 @ 18:00) (75 - 80)  BP: 168/85 (04-26-19 @ 18:00) (147/121 - 168/85)  RR: 14 (04-26-19 @ 18:00) (14 - 16)  SpO2: 100% (04-26-19 @ 18:00) (100% - 100%)  Wt(kg): --    PE   RLE:  Skin intact; No ecchymosis/soft tissue swelling  Compartments soft; + TTP about hip. No TTP to knee/leg/ankle/foot   ROM lmited 2/2 pain   Unable to SLR; + Log Roll/Heel Strike  Motor intact GS/TA/FHL/EHL  SILT L2-S1  DP/PT pulses 2+    LLE/BUE:   No bony TTP; Good ROM w/o pain; Exam Unremarkable    Imaging:  XR demonstrating R femoral intertrochanteric  fracture    89y Female with R. intertroch fracture  - Pain control  - NPO/IVF  - CBC/BMP/Coags/UA/T+S x2  - EKG/CXR  - Medical clearance pending  - Hold Xarelto  - Plan for OR for ORIF tomorrow AM  - consent in chart 89y Female hx of afib on xarelto, [facility] pain and inability to ambulate after mechanical fall from bed today. She has a history of left hip fracture s/p IMN by Dr. Vigil in 3 years ago.  Patient denies headstrike or LOC. Patient denies radiation of pain. Patient denies numbness/tingling/burning in the RLE. No other bone/joint complaints. Patient is a community ambulator    PAST MEDICAL & SURGICAL HISTORY:  Atrial fibrillation, unspecified type  HLD (hyperlipidemia)  DM2 (diabetes mellitus, type 2)  HTN (hypertension)  Arthritis involving multiple sites  S/P hip replacement    MEDICATIONS  (STANDING):    MEDICATIONS  (PRN):    Allergies    No Known Allergies    Intolerances                              14.3   15.61 )-----------( 235      ( 26 Apr 2019 17:50 )             45.4     04-26    133<L>  |  90<L>  |  19  ----------------------------<  462<HH>  3.6   |  29  |  1.09    Ca    10.1      26 Apr 2019 17:50    TPro  7.1  /  Alb  3.6  /  TBili  1.0  /  DBili  x   /  AST  22  /  ALT  35<H>  /  AlkPhos  134<H>  04-26    PT/INR - ( 26 Apr 2019 17:50 )   PT: 13.7 SEC;   INR: 1.23          PTT - ( 26 Apr 2019 17:50 )  PTT:27.8 SEC    T(C): 36.4 (04-26-19 @ 15:59), Max: 36.4 (04-26-19 @ 15:59)  HR: 75 (04-26-19 @ 18:00) (75 - 80)  BP: 168/85 (04-26-19 @ 18:00) (147/121 - 168/85)  RR: 14 (04-26-19 @ 18:00) (14 - 16)  SpO2: 100% (04-26-19 @ 18:00) (100% - 100%)  Wt(kg): --    PE   RLE:  Skin intact; No ecchymosis/soft tissue swelling  Compartments soft; + TTP about hip. No TTP to knee/leg/ankle/foot   ROM lmited 2/2 pain   Unable to SLR; + Log Roll/Heel Strike  Motor intact GS/TA/FHL/EHL  SILT L2-S1  DP/PT pulses 2+    LLE/BUE:   No bony TTP; Good ROM w/o pain; Exam Unremarkable    Imaging:  XR demonstrating R femoral intertrochanteric  fracture    89y Female with R. intertroch fracture, past history of left IMN  - Pain control  - NPO/IVF  - CBC/BMP/Coags/UA/T+S x2  - EKG/CXR  - Medical clearance pending  - Hold Xarelto  - Plan for OR for ORIF tomorrow AM  - consent in chart

## 2019-04-26 NOTE — ED PROVIDER NOTE - OBJECTIVE STATEMENT
88yo female PMH atrial fibrillation on xarelto, DM, presenting with R groin pain s/p mechanical fall out of bed. Patient was taking a nap, rolled out of bed onto R side, unsure if hit head, no loss of consciousness. Patient was on ground for 30 minutes then called EMS. No blurred or double vision, no vomiting, no neck pain. Unable to ambulate after fall. 89yo female PMH atrial fibrillation on Xarelto, DM, presenting with R groin pain s/p mechanical fall out of bed. Patient was taking a nap, rolled out of bed onto R side, unsure if hit head, no loss of consciousness. Patient was on ground for 30 minutes then called EMS. No blurred or double vision, no vomiting, no neck pain. Unable to ambulate after fall.

## 2019-04-26 NOTE — ED PROVIDER NOTE - PHYSICAL EXAMINATION
Gen: NAD, AOx3  Head: NCAT  HEENT: EOMI, oral mucosa moist, normal conjunctiva  Lung: CTAB, no respiratory distress, no wheezing, rales, rhonchi  CV: normal s1/s2, rrr, no murmurs, Normal perfusion, pulses 2+ throughout  Abd: soft, NTND  MSK: R hip TTP, right leg lengthened and externally rotated, no other deformity noted, moving other 3 extremities  Neuro: No focal neurologic deficits  Skin: No rash   Psych: normal affect

## 2019-04-26 NOTE — H&P ADULT - NSCORESITESY/N_GEN_A_CORE_RD
1120: Infant discharged to home with parents. Infant placed in car seat by parent. Discharge instructions and educational materials reviewed by parents, and parents reported they have no further questions. Bands verified on mom and infant, see footprint sheet. Infant's follow up appointment confirmed for 10 AM on September 5, 2018 with Serenity Clemens at [facility]. Importance of keeping all follow up appointments explained to patient, patient verbalizes understanding. No

## 2019-04-26 NOTE — CONSULT NOTE ADULT - PROBLEM SELECTOR RECOMMENDATION 9
- Patient is intermediate risk for intermediate risk procedure and is at present, medically optimized for urgent procedure.

## 2019-04-26 NOTE — H&P ADULT - ATTENDING COMMENTS
Patient seen and discussed with resident.  Films reviewed.  Agree with assessment and plan.  For OR today- R hip short IMN.   General anesthesia as on anticoagulation

## 2019-04-26 NOTE — CONSULT NOTE ADULT - SUBJECTIVE AND OBJECTIVE BOX
HPI:  89 y.o. woman with history of afib on xarelto, DM, and essential hypertension who came to the ER for evaluation of right hip pain s/p fall from bed. Patient states that she rolled out of bed and fell. In ER patient was noted to have right trochanteric fracture. Presently, patient reports ongoing right hip pain. Patient states that prior to her fall, she had no significant decrease in her ability to ambulate. No reports of shortness of breath, nausea, or vomiting.     PAST MEDICAL & SURGICAL HISTORY:  Atrial fibrillation, unspecified type  HLD (hyperlipidemia)  DM2 (diabetes mellitus, type 2)  HTN (hypertension)  Arthritis involving multiple sites  S/P hip replacement    Home Medications:  acetaminophen 325 mg oral tablet: 2 tab(s) orally every 6 hours, As needed, Temp greater or equal to 38C (100.4F) (23 Oct 2018 12:22)  Artificial Tears ophthalmic solution: 1 drop(s) to each affected eye 2 times a day, As Needed (20 Oct 2018 12:12)  DilTIAZem Hydrochloride  mg/24 hours oral capsule, extended release: 1 cap(s) orally once a day (20 Oct 2018 12:12)  glipiZIDE 10 mg oral tablet: 1 tab(s) orally once a day (23 Oct 2018 15:06)  hydroCHLOROthiazide 25 mg oral tablet: 1 tab(s) orally once a day (23 Oct 2018 15:07)  Januvia 50 mg oral tablet: 1 tab(s) orally once a day (20 Oct 2018 12:12)  metoprolol succinate 25 mg oral tablet, extended release: 1 tab(s) orally once a day (20 Oct 2018 12:12)  pantoprazole 40 mg oral delayed release tablet: 1 tab(s) orally once a day (before a meal) (23 Oct 2018 15:01)  PARoxetine 20 mg oral tablet: 1 tab(s) orally once a day (20 Oct 2018 12:12)  pioglitazone 30 mg oral tablet: 1 tab(s) orally once a day (23 Oct 2018 15:06)  simvastatin 10 mg oral tablet: 1 tab(s) orally once a day (at bedtime) (20 Oct 2018 12:12)  triamcinolone 0.1% topical cream: Apply topically to affected area on arms, chest, and legs 2 times a day (20 Oct 2018 12:12)  Xarelto 15 mg oral tablet: 1 tab(s) orally once a day (in the evening) (20 Oct 2018 12:12)    MEDICATIONS  (PRN):  dextrose 40% Gel 15 Gram(s) Oral once PRN Blood Glucose LESS THAN 70 milliGRAM(s)/deciliter  glucagon  Injectable 1 milliGRAM(s) IntraMuscular once PRN Glucose LESS THAN 70 milligrams/deciliter  magnesium hydroxide Suspension 30 milliLiter(s) Oral daily PRN Constipation  oxyCODONE    IR 2.5 milliGRAM(s) Oral every 4 hours PRN Moderate Pain (4 - 6)  oxyCODONE    IR 5 milliGRAM(s) Oral every 4 hours PRN Severe Pain (7 - 10)  polyvinyl alcohol 1.4%/povidone 0.6% Ophthalmic Solution - Peds 1 Drop(s) Both EYES two times a day PRN Dry Eyes    Allergies:  No Known Allergies    Past surgical history  - Left femur ORIF  - tonsillectomy    PMH: As above    FMHX:   Parents are     ROS: All negative except as listed above     Vitals:  T(C): 36.4 (19 @ 15:59), Max: 36.4 (19 @ 15:59)  HR: 75 (19 @ 18:00) (75 - 80)  BP: 168/85 (19 @ 18:00) (147/121 - 168/85)  RR: 14 (19 @ 18:00) (14 - 16)  SpO2: 100% (19 @ 18:00) (100% - 100%)  Wt(kg): --    PHYSICAL EXAM:  GENERAL: NAD, well-developed  HEAD:  Atraumatic, Normocephalic  EYES: EOMI, PERRLA, conjunctiva and sclera clear  NECK: Supple, No JVD  CHEST/LUNG: Clear to auscultation bilaterally; No wheeze  HEART: Regular rate and rhythm; No murmurs, rubs, or gallops  ABDOMEN: Soft, Nontender, Nondistended; Bowel sounds present  EXTREMITIES: ROM lmited 2/2 pain   Unable to SLR; + Log Roll/Heel Strike  PSYCH: AAOx3  NEUROLOGY: non-focal  SKIN: No rashes or lesions

## 2019-04-26 NOTE — ED ADULT TRIAGE NOTE - CHIEF COMPLAINT QUOTE
pt bibems from home, pt rolled off the bed while napping, fell on the ground and hit her head. pt also right hip, groin, and knee pain. pt was on the ground for 30 mins, pt currently A&Ox3, able to bear weight with assistance. pmhx: afib on xarelto

## 2019-04-26 NOTE — ED ADULT NURSE NOTE - OBJECTIVE STATEMENT
89 year old alert and oriented female, PAULA,  lives alone, decided to take a nap today before her PMD appointment. Pt states she went to nap, layed in bed, and the next thing she remembers is waking up on the floor. Pt states she must have rolled out of bed and onto a wood floor. Pt c/o right shoulder. right knee, and right hip pain. No contusions or abrasions noted. PIV placed labs drawn and sent as ordered Plan of care discussed with pt   PMH: on Xarelto for afib, HTN, DM ll, High cholesterol, arthritis

## 2019-04-27 NOTE — PROGRESS NOTE ADULT - SUBJECTIVE AND OBJECTIVE BOX
Patient seen and examined.  No acute events in PACU.  Pain well controlled.     Vital Signs Last 24 Hrs  T(C): 36.5 (27 Apr 2019 16:00), Max: 36.8 (27 Apr 2019 14:05)  T(F): 97.7 (27 Apr 2019 16:00), Max: 98.2 (27 Apr 2019 14:05)  HR: 97 (27 Apr 2019 16:45) (74 - 107)  BP: 102/61 (27 Apr 2019 16:30) (98/63 - 160/141)  BP(mean): 70 (27 Apr 2019 16:30) (70 - 145)  RR: 11 (27 Apr 2019 16:45) (11 - 28)  SpO2: 99% (27 Apr 2019 16:45) (88% - 100%)    04-27 @ 07:01  -  04-27 @ 18:01  --------------------------------------------------------  IN: 90 mL / OUT: 0 mL / NET: 90 mL                            13.2   16.14 )-----------( 228      ( 27 Apr 2019 15:00 )             43.0   04-27    136  |  93<L>  |  17  ----------------------------<  306<H>  3.8   |  27  |  1.00    Ca    9.7      27 Apr 2019 15:00    TPro  7.1  /  Alb  3.6  /  TBili  1.0  /  DBili  x   /  AST  22  /  ALT  35<H>  /  AlkPhos  134<H>  04-26      Exam:  Gen: NAD  RLE:  Dressing c/d/i  Motor: 5/5 EHL/FHL/TA/Gastrocnemius  Sensory: SILT DP/SP/S/S/T nerve distributions  Vascular: 2+ Dorsalis Pedis pulse        Assessment/Plan:  This is a 89yFemale admitted for R IT fx s/p IMN    -pain control  -PT  -WBAT  -OOB  -DVT ppx  -dispo plan

## 2019-04-27 NOTE — CHART NOTE - NSCHARTNOTEFT_GEN_A_CORE
Patient seen and examined.  No acute events in PACU.  Pain well controlled.     Vital Signs Last 24 Hrs  T(C): 36.5 (27 Apr 2019 16:00), Max: 36.8 (27 Apr 2019 14:05)  T(F): 97.7 (27 Apr 2019 16:00), Max: 98.2 (27 Apr 2019 14:05)  HR: 97 (27 Apr 2019 16:45) (74 - 107)  BP: 102/61 (27 Apr 2019 16:30) (98/63 - 160/141)  BP(mean): 70 (27 Apr 2019 16:30) (70 - 145)  RR: 11 (27 Apr 2019 16:45) (11 - 28)  SpO2: 99% (27 Apr 2019 16:45) (88% - 100%)    04-27 @ 07:01  -  04-27 @ 18:00  --------------------------------------------------------  IN: 90 mL / OUT: 0 mL / NET: 90 mL                            13.2   16.14 )-----------( 228      ( 27 Apr 2019 15:00 )             43.0   04-27    136  |  93<L>  |  17  ----------------------------<  306<H>  3.8   |  27  |  1.00    Ca    9.7      27 Apr 2019 15:00    TPro  7.1  /  Alb  3.6  /  TBili  1.0  /  DBili  x   /  AST  22  /  ALT  35<H>  /  AlkPhos  134<H>  04-26      Awake, alert. Pleasant and cooperative. No acute distress      Assessment/Plan:  This is a 89yFemale admitted for

## 2019-04-27 NOTE — PRE-OP CHECKLIST - SELECT TESTS ORDERED
PT/PTT/Type and Cross/CMP/Urinalysis/HCG/Type and Screen/CBC/INR/UCG/EKG/POCT Blood Glucose/BMP INR/Urinalysis/EKG/CMP/Type and Cross/Type and Screen/CXR/BMP/PT/PTT/HCG/UCG/POCT Blood Glucose/CBC

## 2019-04-27 NOTE — PRE-OP CHECKLIST - TO WHOM
Elizabeth YORK Elizabeth YORK [facility]illary[facility] [place]

## 2019-04-27 NOTE — PROGRESS NOTE ADULT - SUBJECTIVE AND OBJECTIVE BOX
Orthopedic Surgery Progress Note    Patient seen and examined this morning. No acute events. Pain is well controlled. Plan for OR today    O:  Vital Signs Last 24 Hrs  T(C): 36.4 (26 Apr 2019 23:26), Max: 36.6 (26 Apr 2019 20:12)  T(F): 97.5 (26 Apr 2019 23:26), Max: 97.9 (26 Apr 2019 20:12)  HR: 74 (26 Apr 2019 23:26) (74 - 96)  BP: 153/84 (26 Apr 2019 23:26) (146/94 - 168/85)  BP(mean): --  RR: 16 (26 Apr 2019 23:26) (14 - 17)  SpO2: 98% (26 Apr 2019 23:26) (96% - 100%)    PE   RLE:  Skin intact; No ecchymosis/soft tissue swelling  Compartments soft; + TTP about hip. No TTP to knee/leg/ankle/foot   ROM lmited 2/2 pain   Unable to SLR; + Log Roll/Heel Strike  Motor intact GS/TA/FHL/EHL  SILT L2-S1  DP/PT pulses 2+    Labs:                        14.3   15.61 )-----------( 235      ( 26 Apr 2019 17:50 )             45.4       04-26    133<L>  |  90<L>  |  19  ----------------------------<  462<HH>  3.6   |  29  |  1.09        PT/INR - ( 26 Apr 2019 17:50 )   PT: 13.7 SEC;   INR: 1.23          PTT - ( 26 Apr 2019 17:50 )  PTT:27.8 SEC    89y Female with R. intertroch fracture  - Pain control  - NPO for OR today for IMN w/ Dr. Barba  - FU AM labs  - FU UA  - Patient cleared by medicine  - Hold Xarelto Orthopedic Surgery Progress Note    Patient seen and examined this morning. No acute events. Pain is well controlled. Plan for OR today     O:  Vital Signs Last 24 Hrs  T(C): 36.4 (26 Apr 2019 23:26), Max: 36.6 (26 Apr 2019 20:12)  T(F): 97.5 (26 Apr 2019 23:26), Max: 97.9 (26 Apr 2019 20:12)  HR: 74 (26 Apr 2019 23:26) (74 - 96)  BP: 153/84 (26 Apr 2019 23:26) (146/94 - 168/85)  BP(mean): --  RR: 16 (26 Apr 2019 23:26) (14 - 17)  SpO2: 98% (26 Apr 2019 23:26) (96% - 100%)    PE   RLE:  Skin intact; No ecchymosis/soft tissue swelling  Compartments soft; + TTP about hip. No TTP to knee/leg/ankle/foot   ROM lmited 2/2 pain   Unable to SLR; + Log Roll/Heel Strike  Motor intact GS/TA/FHL/EHL  SILT L2-S1  DP/PT pulses 2+    Labs:                        14.3   15.61 )-----------( 235      ( 26 Apr 2019 17:50 )             45.4       04-26    133<L>  |  90<L>  |  19  ----------------------------<  462<HH>  3.6   |  29  |  1.09        PT/INR - ( 26 Apr 2019 17:50 )   PT: 13.7 SEC;   INR: 1.23          PTT - ( 26 Apr 2019 17:50 )  PTT:27.8 SEC    89y Female with R. intertroch fracture  - Pain control  - NPO for OR today for IMN w/ Dr. Barba  - FU AM labs  - FU UA  - Patient cleared by medicine  - Hold Xarelto

## 2019-04-28 NOTE — BEHAVIORAL HEALTH ASSESSMENT NOTE - HPI (INCLUDE ILLNESS QUALITY, SEVERITY, DURATION, TIMING, CONTEXT, MODIFYING FACTORS, ASSOCIATED SIGNS AND SYMPTOMS)
89 y.o. woman with history of afib on xarelto, DM, and essential hypertension who came to the ER for evaluation of right hip pain s/p fall from bed found to have right trochanteric fracture, now surgical fixation 4/27/19. Psych consulted for increased agitation, confusion, and paranoid statements about the nurse being "a [place] spy." Patient received Haldol 0.5 mg IM x 3 (2:28, 9:54, 11:42) today for agitation. On evaluation patient is calm in bed but appears suspicious of the nurses. She is AOx4. She reports feeling fine and wants to leave because she believes the nurses are "up to no good." No psych hx known. No current SI/HI. Interview limited to minimize agitation. 89 y.o. woman domiciled alone, 2 adult daughters who live out of state, partially retired teacher, PMH of afib on xarelto, DM, and essential hypertension who came to the ER for evaluation of right hip pain s/p fall from bed found to have right trochanteric fracture, now surgical fixation 4/27/19. Psych consulted for increased agitation, confusion, and paranoid statements about the nurse being "a [place] spy." Patient received Haldol 0.5 mg IM x 3 (2:28, 9:54, 11:42) today for agitation. On evaluation patient is calm in bed but appears suspicious of the nurses. She is AOx4. She reports feeling fine and wants to leave because she believes the nurses are "up to no good." No psych hx known. No current SI/HI. Interview limited to minimize agitation.    Per chart review  assessment note from January 2018 patient has no psych history and was seen at that time for anxiety while at [facility] for Afib and recommended follow up with outpatient therapist. At that time patient appeared fully oriented with no memory issues.

## 2019-04-28 NOTE — PROGRESS NOTE ADULT - PROBLEM SELECTOR PLAN 3
Cardiology consulted   resume metoprolol for now  rate controlled   c/w xarelto Cardiology consulted   resume metoprolol and Cardizem   rate controlled for now   c/w xarelto

## 2019-04-28 NOTE — PHYSICAL THERAPY INITIAL EVALUATION ADULT - AMBULATION SKILLS, REHAB EVAL
2/26/2018 GI consult scheduled .    2/22/2018 Per sofatutor, Message was delivered on an answering machine  
unable to obtain

## 2019-04-28 NOTE — CONSULT NOTE ADULT - SUBJECTIVE AND OBJECTIVE BOX
HPI:  89y Female hx of afib on xarelto, HTN, HLD, DM presents s/p [facility] fall c/o severe R hip pain and inability to ambulate after mechanical fall from bed today. She has a history of left hip fracture s/p IMN by Dr. Vigil 3 years ago.  Patient denies headstrike or LOC. Patient denies radiation of pain. Patient denies numbness/tingling/burning in the RLE. No other bone/joint complaints. She is now s/p R IMN of a R IT fracture.  She is pleasantly confused with a 1:1 in her room.  She denies pain or SOB.      PAST MEDICAL & SURGICAL HISTORY:  Atrial fibrillation, unspecified type  HLD (hyperlipidemia)  DM2 (diabetes mellitus, type 2)  HTN (hypertension)  Arthritis involving multiple sites  S/P hip replacement      MEDICATIONS  (STANDING):  diltiazem    milliGRAM(s) Oral daily  insulin glargine Injectable (LANTUS) 15 Unit(s) SubCutaneous at bedtime  insulin lispro (HumaLOG) corrective regimen sliding scale   SubCutaneous three times a day before meals  insulin lispro (HumaLOG) corrective regimen sliding scale   SubCutaneous at bedtime  insulin lispro Injectable (HumaLOG) 5 Unit(s) SubCutaneous three times a day with meals  lactated ringers. 1000 milliLiter(s) (30 mL/Hr) IV Continuous <Continuous>  metoprolol tartrate 12.5 milliGRAM(s) Oral two times a day  rivaroxaban 15 milliGRAM(s) Oral every 24 hours    Allergies  No Known Allergies    FAMILY HISTORY:  No pertinent family history in first degree relatives    Noncontributory for premature coronary disease or sudden cardiac death    SOCIAL HISTORY:    [x ] Non-smoker  [ ] Smoker  [ ] Alcohol      REVIEW OF SYSTEMS:  [ ]chest pain  [  ]shortness of breath  [  ]palpitations  [  ]syncope  [ ]near syncope [ ]upper extremity weakness   [ ] lower extremity weakness  [  ]diplopia  [  ]altered mental status   [  ]fevers  [ ]chills [ ]nausea  [ ]vomitting  [  ]dysphagia    [ ]abdominal pain  [ ]melena  [ ]BRBPR    [  ]epistaxis  [  ]rash  [ ]lower extremity edema      [ ] All others negative	  [x] Unable to obtain    PHYSICAL EXAM:  T(C): 37.6 (04-28-19 @ 09:42), Max: 37.6 (04-28-19 @ 09:42)  HR: 97 (04-28-19 @ 09:42) (81 - 105)  BP: 128/66 (04-28-19 @ 09:42) (98/66 - 128/66)  RR: 17 (04-28-19 @ 09:42) (11 - 17)  SpO2: 95% (04-28-19 @ 09:42) (92% - 100%)    HEENT:   Normal oral mucosa, PERRL, EOMI	  Lymphatic: No lymphadenopathy , no edema  Cardiovascular: Normal S1 S2, No JVD, No murmurs , Peripheral pulses palpable 2+ bilaterally  Respiratory: Lungs clear to auscultation, normal effort 	  Gastrointestinal:  Soft, Non-tender, + BS	  Skin: No rashes, No ecchymoses, No cyanosis, warm to touch    DIAGNOSTIC DATA:    ECG:  	AF, Vrate 74bpm, Left axis, old IWMI age indet, NS ST-T changes    < from: CT Head No Cont (04.26.19 @ 18:29) >  IMPRESSION:     No acute intracranial hemorrhage, extra-axial fluid collection,   hydrocephalus, mass effect or midline shift. Atrophy and ischemic   changes. 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    < end of copied text >    	  LABS:                        12.6   13.87 )-----------( 213      ( 28 Apr 2019 06:20 )             40.5     137  |  94<L>  |  24<H>  ----------------------------<  317<H>  4.8   |  28  |  1.13    Ca    9.4      28 Apr 2019 06:20    TPro  7.1  /  Alb  3.6  /  TBili  1.0  /  DBili  x   /  AST  22  /  ALT  35<H>  /  AlkPhos  134<H>  04-26    HgA1c: Hemoglobin A1C, Whole Blood: 11.6 % (04-28 @ 06:20)      ASSESSMENT/PLAN: 89y Female hx of afib on xarelto, HTN, HLD, DM presents s/p [facility] fall c/o severe R hip pain and inability to ambulate after mechanical fall from bed today. She has a history of left hip fracture s/p IMN by Dr. Vigil 3 years ago.  She is now s/p R IMN of a R IT fracture on 4/27    --tolerated procedure well from CV perspective  --her last echo in my office revealed preserved LV function  --She is not in clinical CHF  --f/u psych eval  --no further inpatient cardiac work up plammed

## 2019-04-28 NOTE — PROVIDER CONTACT NOTE (OTHER) - SITUATION
Patient's blood sugar before bedtime was 300. Online ordered for standing lantus insulin which was given.

## 2019-04-28 NOTE — PROVIDER CONTACT NOTE (OTHER) - SITUATION
Pt blood sugar was 447 and . pt was refusing alll medications and very agitated. Pt allowed us to do fingetstick, insulin and meds

## 2019-04-28 NOTE — BEHAVIORAL HEALTH ASSESSMENT NOTE - SUMMARY
89 y.o. woman with history of afib on xarelto, DM, and essential hypertension who came to the ER for evaluation of right hip pain s/p fall from bed found to have right trochanteric fracture, now surgical fixation 4/27/19. Psych consulted for increased agitation, confusion, and paranoid statements about the nurse being "a [place] spy." Patient received Haldol 0.5 mg IM x 3 (2:28, 9:54, 11:42) today for agitation. On evaluation patient is calm in bed but appears suspicious of the nurses. She is AOx4. She reports feeling fine and wants to leave because she believes the nurses are "up to no good." No psych hx known. No current SI/HI. Interview limited to minimize agitation.

## 2019-04-28 NOTE — PROVIDER CONTACT NOTE (OTHER) - SITUATION
throughout shift pt has been refusing to take any type of medication or allow fingersticks. Pt is extremelly agitated and restless. pt gets aggressive and does not allow RN or PCA to do anything.

## 2019-04-28 NOTE — BEHAVIORAL HEALTH ASSESSMENT NOTE - NSBHCHARTREVIEWVS_PSY_A_CORE FT
ICU Vital Signs Last 24 Hrs  T(C): 37.6 (28 Apr 2019 09:42), Max: 37.6 (28 Apr 2019 09:42)  T(F): 99.6 (28 Apr 2019 09:42), Max: 99.6 (28 Apr 2019 09:42)  HR: 97 (28 Apr 2019 09:42) (81 - 107)  BP: 128/66 (28 Apr 2019 09:42) (98/63 - 160/141)  BP(mean): 70 (27 Apr 2019 16:30) (70 - 145)  ABP: --  ABP(mean): --  RR: 17 (28 Apr 2019 09:42) (11 - 28)  SpO2: 95% (28 Apr 2019 09:42) (88% - 100%)

## 2019-04-28 NOTE — PROGRESS NOTE ADULT - SUBJECTIVE AND OBJECTIVE BOX
Patient is a 89y old  Female who presents with a chief complaint of fall (2019 00:50)      SUBJECTIVE / OVERNIGHT EVENTS:  Pt was seen at noon. Pt was awake, alert, knows she is in the hospital, has to check the phone to know the date. Very tangential on interview. She said she think the nurse could be a [place] spy. denied cp/sob  Pt was agitated in AM.     MEDICATIONS  (STANDING):  insulin lispro (HumaLOG) corrective regimen sliding scale   SubCutaneous three times a day before meals  insulin lispro (HumaLOG) corrective regimen sliding scale   SubCutaneous at bedtime  lactated ringers. 1000 milliLiter(s) (30 mL/Hr) IV Continuous <Continuous>  metoprolol tartrate 12.5 milliGRAM(s) Oral two times a day  rivaroxaban 15 milliGRAM(s) Oral every 24 hours    MEDICATIONS  (PRN):  acetaminophen   Tablet .. 975 milliGRAM(s) Oral every 6 hours PRN Mild Pain (1 - 3)  haloperidol    Injectable 0.5 milliGRAM(s) IntraMuscular every 6 hours PRN Agitation  oxyCODONE    IR 2.5 milliGRAM(s) Oral every 4 hours PRN Moderate Pain (4 - 6)  oxyCODONE    IR 5 milliGRAM(s) Oral every 4 hours PRN Severe Pain (7 - 10)      T(C): 37.6 (19 @ 09:42), Max: 37.6 (19 @ 09:42)  HR: 97 (19 @ 09:42) (81 - 107)  BP: 128/66 (19 @ 09:42) (98/63 - 128/66)  RR: 17 (19 @ 09:42) (11 - 19)  SpO2: 95% (19 @ 09:42) (89% - 100%)  CAPILLARY BLOOD GLUCOSE      POCT Blood Glucose.: 272 mg/dL (2019 07:39)  POCT Blood Glucose.: 307 mg/dL (2019 00:39)  POCT Blood Glucose.: 298 mg/dL (2019 22:55)  POCT Blood Glucose.: 297 mg/dL (2019 16:20)    I&O's Summary    2019 07:01  -  2019 07:00  --------------------------------------------------------  IN: 90 mL / OUT: 700 mL / NET: -610 mL    2019 07:01  -  2019 15:11  --------------------------------------------------------  IN: 0 mL / OUT: 400 mL / NET: -400 mL        PHYSICAL EXAM:  GENERAL: NAD, well-developed  HEAD:  Atraumatic, Normocephalic  EYES: EOMI, PERRLA, conjunctiva and sclera clear  NECK: Supple, No JVD  CHEST/LUNG: Clear to auscultation bilaterally; No wheeze  HEART: s1 s2, regular rhythm and rate   ABDOMEN: Soft, Nontender, Nondistended; Bowel sounds present  EXTREMITIES:  2+ Peripheral Pulses, No clubbing, cyanosis, or edema  PSYCH: awake, alert, calm, confused   NEUROLOGY: non-focal  SKIN: right hip dressing c/d/i    LABS:                        12.6   13.87 )-----------( 213      ( 2019 06:20 )             40.5     04-28    137  |  94<L>  |  24<H>  ----------------------------<  317<H>  4.8   |  28  |  1.13    Ca    9.4      2019 06:20    TPro  7.1  /  Alb  3.6  /  TBili  1.0  /  DBili  x   /  AST  22  /  ALT  35<H>  /  AlkPhos  134<H>  04-26    PT/INR - ( 2019 06:34 )   PT: 12.2 SEC;   INR: 1.07          PTT - ( 2019 06:34 )  PTT:29.0 SEC      Urinalysis Basic - ( 2019 05:47 )    Color: YELLOW / Appearance: CLEAR / S.030 / pH: 6.0  Gluc: >1000 / Ketone: NEGATIVE  / Bili: NEGATIVE / Urobili: NORMAL   Blood: NEGATIVE / Protein: 300 / Nitrite: NEGATIVE   Leuk Esterase: NEGATIVE / RBC: 3-5 / WBC 0-2   Sq Epi: OCC / Non Sq Epi: x / Bacteria: NEGATIVE        RADIOLOGY & ADDITIONAL TESTS:    Imaging Personally Reviewed:    Consultant(s) Notes Reviewed:      Care Discussed with Consultants/Other Providers:

## 2019-04-28 NOTE — PROGRESS NOTE ADULT - SUBJECTIVE AND OBJECTIVE BOX
Patient seen and examined.  No acute events overnight.  Pain well controlled.   Refused Xarelto yesterday evening.    Vital Signs Last 24 Hrs  T(C): 36.5 (27 Apr 2019 16:00), Max: 36.8 (27 Apr 2019 14:05)  T(F): 97.7 (27 Apr 2019 16:00), Max: 98.2 (27 Apr 2019 14:05)  HR: 97 (27 Apr 2019 16:45) (74 - 107)  BP: 102/61 (27 Apr 2019 16:30) (98/63 - 160/141)  BP(mean): 70 (27 Apr 2019 16:30) (70 - 145)  RR: 11 (27 Apr 2019 16:45) (11 - 28)  SpO2: 99% (27 Apr 2019 16:45) (88% - 99%)    04-27 @ 07:01  -  04-28 @ 00:50  --------------------------------------------------------  IN: 90 mL / OUT: 300 mL / NET: -210 mL                            13.2   16.14 )-----------( 228      ( 27 Apr 2019 15:00 )             43.0   04-27    136  |  93<L>  |  17  ----------------------------<  306<H>  3.8   |  27  |  1.00    Ca    9.7      27 Apr 2019 15:00    TPro  7.1  /  Alb  3.6  /  TBili  1.0  /  DBili  x   /  AST  22  /  ALT  35<H>  /  AlkPhos  134<H>  04-26      Awake, alert. Pleasant and cooperative. No acute distress  Exam:  Gen: NAD  RLE:  Motor: 5/5 EHL/FHL/TA/Gastrocnemius  Sensory: SILT DP/SP/S/S/T nerve distributions  Vascular: 2+ Dorsalis Pedis pulse      Assessment/Plan:  This is a 89yFemale admitted for R IT fx s/p IMN, doing well o/n    -pain control  -PT  -WBAT  -OOB  -DVT ppx  -dispo plan

## 2019-04-28 NOTE — BEHAVIORAL HEALTH ASSESSMENT NOTE - RISK ASSESSMENT
Risk factors include disorientation and irritability. Protective factors include no known psych hx and no active SI/HI. Patient requires further stabilization and reassessment prior to discharge.

## 2019-04-28 NOTE — BEHAVIORAL HEALTH ASSESSMENT NOTE - NSBHCHARTREVIEWLAB_PSY_A_CORE FT
LABS:                        12.6   13.87 )-----------( 213      ( 28 Apr 2019 06:20 )             40.5     28 Apr 2019 06:20    137    |  94     |  24     ----------------------------<  317    4.8     |  28     |  1.13     Ca    9.4        28 Apr 2019 06:20      PT/INR - ( 27 Apr 2019 06:34 )   PT: 12.2 SEC;   INR: 1.07          PTT - ( 27 Apr 2019 06:34 )  PTT:29.0 SEC

## 2019-04-28 NOTE — PROGRESS NOTE ADULT - PROBLEM SELECTOR PLAN 4
A1C 11.6  endo consult in AM  start lantus and premeal humalog  monitor FS, ISS A1C 11.6  endo consult in AM  start lantus 15 and premeal humalog 5  monitor FS, ISS

## 2019-04-28 NOTE — PHYSICAL THERAPY INITIAL EVALUATION ADULT - ACTIVE RANGE OF MOTION EXAMINATION, REHAB EVAL
active assistive ROM of right hip flexion 0-50 degrees/Left LE Active ROM was WFL (within functional limits)/bilateral upper extremity Active ROM was WFL (within functional limits)

## 2019-04-28 NOTE — PHYSICAL THERAPY INITIAL EVALUATION ADULT - PERTINENT HX OF CURRENT PROBLEM, REHAB EVAL
Patient is 89 year old female admitted with history of afib, DM, presented after a fall, s/p right intertochanteric fracture  s/p IMN

## 2019-04-28 NOTE — PROGRESS NOTE ADULT - PROBLEM SELECTOR PLAN 1
Pt likely has post op delirium  appreciate psych input   Monitor QTc   c/w Haldol prn if QTc < 500ms  fall precaution

## 2019-04-28 NOTE — BEHAVIORAL HEALTH ASSESSMENT NOTE - NSBHCONSULTSUBST_PSY_A_CORE
Called pt and discussed lab test results  discussed  him that we should strting considering Insulin  we will discus further on his next appointment in Feb       Electronically signed by:SHAWNA GUEVARA M.D.  Jan 19 2017 11:22AM CST    
no

## 2019-04-29 NOTE — DISCHARGE NOTE PROVIDER - HOSPITAL COURSE
89year old female is admitted for fall sustained Right hip intertrochanteric fracture underwent surgery Right hip intramedullary nail 4/27/2019 without any intraoperative complications.  Patient is doing well and stable for discharge.  Patient is tolerating physical therapy: weight bearing to Right leg, out of bed for gait training.  Keep dressing on as is until day of office visit.  Staples/sutures if applicable, to be removed in Dr. Barba' office 14 days from date of surgery.  Patient is on home med Xarelto for anticoagulation.  Orthopaedic Surgeon Dr. Barba would like patient to call private MD/Internist for appointment postop to maintain continuity of care.  Follow up with Dr. Barba' office in 1-2 weeks. 89year old female is admitted for fall sustained Right hip intertrochanteric fracture underwent surgery Right hip intramedullary nail 4/27/2019 without any intraoperative complications.  Patient is doing well and stable for discharge.  Patient is tolerating physical therapy: weight bearing to Right leg, out of bed for gait training.  Keep dressing on as is until day of office visit.  Staples/sutures if applicable, to be removed in Dr. Barba' office 14 days from date of surgery.  Patient is on home med Xarelto for anticoagulation.  Patient followed by medicine hospitalist and endocrinology during hospital stay. Diabetes medication was adjusted. Actos was discontinued. Patient is on Lantus 22units at bedtime and Humalog 8U three times a day prior to meals. Orthopaedic Surgeon Dr. Barba would like patient to call private MD/Internist for appointment postop to maintain continuity of care.  Follow up with Dr. Barba' office in 1-2 weeks. 89year old female is admitted for fall sustained Right hip intertrochanteric fracture underwent surgery Right hip intramedullary nail 4/27/2019 without any intraoperative complications.  Patient is doing well and stable for discharge.  Patient is tolerating physical therapy: weight bearing to Right leg, out of bed for gait training.  Keep dressing on as is until day of office visit.  Staples/sutures if applicable, to be removed in Dr. Barba' office 14 days from date of surgery.  Patient is on home med Xarelto for anticoagulation.  Patient followed by medicine hospitalist and endocrinology during hospital stay.  Diabetes medication was adjusted: Actos was discontinued and modified Lantus 22units at bedtime and Humalog 8U three times a day prior to meals.  Orthopaedic Surgeon Dr. Barba would like patient to call private MD/Internist for appointment postop to maintain continuity of care.  Follow up with Dr. Barba' office in 1-2 weeks. 89year old female is admitted for fall sustained Right hip intertrochanteric fracture underwent surgery Right hip intramedullary nail 4/27/2019 without any intraoperative complications.  Patient is doing well and stable for discharge.  Patient is tolerating physical therapy: weight bearing to Right leg, out of bed for gait training.  Keep dressing on as is until day of office visit.  Staples/sutures if applicable, to be removed in Dr. Barba' office 14 days from date of surgery.  Patient is on home med Xarelto for anticoagulation.  Patient followed by medicine hospitalist and endocrinology during hospital stay.  Diabetes medication was adjusted: Actos was discontinued and modified Lantus 22units at bedtime and Humalog 8U three times a day prior to meals.  Orthopaedic Surgeon Dr. Barba would like patient to call private MD/Internist for appointment postop to maintain continuity of care.  Follow up with Dr. Barba' office in 1-2 weeks.        As per ENDOCRINOLOGY DR CASSI CHAVARRIA:        please closely monitor patients fingersticks and blood sugars while in rehab    Patient to continue and to continue low dose sliding scale which is to be ADJUSTED AND MONITORED while in rehab to patiens needs.    Please discuss diabetes regimen with patient and PMD prior to discharge from rehab to assure patient safety. Please perform insulin teaching and glucometer teaching, nutritional teaching prior to discharge. 89year old female is admitted for fall sustained Right hip intertrochanteric fracture underwent surgery Right hip intramedullary nail 4/27/2019 without any intraoperative complications.  Patient is doing well and stable for discharge.  Patient is tolerating physical therapy: weight bearing to Right leg, out of bed for gait training.  Keep dressing on as is until day of office visit.  Staples/sutures if applicable, to be removed in Dr. Barba' office 14 days from date of surgery.  Patient is on home med Xarelto for anticoagulation.  Patient followed by medicine hospitalist and endocrinology during hospital stay.  Diabetes medication was adjusted: Actos was discontinued and modified Lantus 22units at bedtime and Humalog 8U three times a day prior to meals.  Orthopaedic Surgeon Dr. Barba would like patient to call private MD/Internist for appointment postop to maintain continuity of care.  Follow up with Dr. Barba' office in 1-2 weeks.        As per ENDOCRINOLOGY DR CASSI CHAVARRIA:        Please closely monitor patients fingersticks and blood sugars while in rehab    Patient to continue low dose sliding scale which is to be ADJUSTED AND MONITORED while in rehab to patiens needs.    Please discuss diabetes regimen with patient and PMD prior to discharge from rehab to assure patient safety. Please perform insulin teaching and glucometer teaching, nutritional teaching prior to discharge from rehab. 89year old female is admitted for fall sustained Right hip intertrochanteric fracture underwent surgery Right hip intramedullary nail 4/27/2019 without any intraoperative complications.  Patient is doing well and stable for discharge.  Patient is tolerating physical therapy: weight bearing to Right leg, out of bed for gait training.  Keep dressing on as is until day of office visit.  Staples/sutures if applicable, to be removed in Dr. Barba' office 14 days from date of surgery.  Patient is on home med Xarelto for anticoagulation.  Patient followed by medicine hospitalist and endocrinology during hospital stay.  Diabetes medication was adjusted: Actos was discontinued and modified Lantus 22units at bedtime and Humalog 8U three times a day prior to meals.  Orthopaedic Surgeon Dr. Barba would like patient to call private MD/Internist for appointment postop to maintain continuity of care.  Follow up with Dr. Barba' office in 1-2 weeks.        As per ENDOCRINOLOGY DR CASSI CHAVARRIA:        Please closely monitor patients fingersticks and blood sugars while in rehab    Patient to continue low dose sliding scale which is to be ADJUSTED AND MONITORED while in rehab to patiens needs.    Please discuss diabetes regimen with patient and PMD prior to discharge from rehab to assure patient safety. Please perform insulin teaching, patient will need glucometer teaching and testing supplies, nutritional teaching prior to discharge from rehab.    PLEASE check vitamin D levels in rehab and assess for safety / fall risk per endocrinology

## 2019-04-29 NOTE — PROGRESS NOTE BEHAVIORAL HEALTH - NSBHCHARTREVIEWLAB_PSY_A_CORE FT
CBC Full  -  ( 28 Apr 2019 06:20 )  WBC Count : 13.87 K/uL  RBC Count : 4.53 M/uL  Hemoglobin : 12.6 g/dL  Hematocrit : 40.5 %  Platelet Count - Automated : 213 K/uL  Mean Cell Volume : 89.4 fL  Mean Cell Hemoglobin : 27.8 pg  Mean Cell Hemoglobin Concentration : 31.1 %  04-28    137  |  94<L>  |  24<H>  ----------------------------<  317<H>  4.8   |  28  |  1.13    Ca    9.4      28 Apr 2019 06:20

## 2019-04-29 NOTE — DISCHARGE NOTE PROVIDER - NSDCCPCAREPLAN_GEN_ALL_CORE_FT
PRINCIPAL DISCHARGE DIAGNOSIS  Diagnosis: Closed intertrochanteric fracture of right femur  Assessment and Plan of Treatment: 89year old female is admitted for fall sustained Right hip intertrochanteric fracture underwent surgery Right hip intramedullary nail 4/27/2019 without any intraoperative complications.  Patient is doing well and stable for discharge.  Patient is tolerating physical therapy: weight bearing to Right leg, out of bed for gait training.  Keep dressing on as is until day of office visit.  Staples/sutures if applicable, to be removed in Dr. Barba' office 14 days from date of surgery.  Patient is on home med Xarelto for anticoagulation.  Orthopaedic Surgeon Dr. Barba would like patient to call private MD/Internist for appointment postop to maintain continuity of care.  Follow up with Dr. Barba' office in 1-2 weeks. PRINCIPAL DISCHARGE DIAGNOSIS  Diagnosis: Closed intertrochanteric fracture of right femur  Assessment and Plan of Treatment: 89year old female is admitted for fall sustained Right hip intertrochanteric fracture underwent surgery Right hip intramedullary nail 4/27/2019 without any intraoperative complications.  Patient is doing well and stable for discharge.  Patient is tolerating physical therapy: weight bearing to Right leg, out of bed for gait training.  Keep dressing on as is until day of office visit.  Staples/sutures if applicable, to be removed in Dr. Barba' office 14 days from date of surgery.  Patient is on home med Xarelto for anticoagulation.  Patient followed by medicine hospitalist and endocrinology during hospital stay.  Diabetes medication was adjusted: Actos was discontinued and modified Lantus 22units at bedtime and Humalog 8U three times a day prior to meals.  Orthopaedic Surgeon Dr. Barba would like patient to call private MD/Internist for appointment postop to maintain continuity of care.  Follow up with Dr. Barba' office in 1-2 weeks. PRINCIPAL DISCHARGE DIAGNOSIS  Diagnosis: Closed intertrochanteric fracture of right femur  Assessment and Plan of Treatment: 89year old female is admitted for fall sustained Right hip intertrochanteric fracture underwent surgery Right hip intramedullary nail 4/27/2019 without any intraoperative complications.  Patient is doing well and stable for discharge.  Patient is tolerating physical therapy: weight bearing to Right leg, out of bed for gait training.  Keep dressing on as is until day of office visit.  Staples/sutures if applicable, to be removed in Dr. Barba' office 14 days from date of surgery.  Patient is on home med Xarelto for anticoagulation.  Patient followed by medicine hospitalist and endocrinology during hospital stay.  Diabetes medication was adjusted: Actos was discontinued and modified Lantus 22units at bedtime and Humalog 8U three times a day prior to meals.  Orthopaedic Surgeon Dr. Barba would like patient to call private MD/Internist for appointment postop to maintain continuity of care.  Follow up with Dr. Barba' office in 1-2 weeks.      SECONDARY DISCHARGE DIAGNOSES  Diagnosis: Type 2 diabetes mellitus with complication, without long-term current use of insulin  Assessment and Plan of Treatment: Poorly controlled Type 2 diabetes mellitus where meds were adjusted by Endocrinology.  Follow up with Dr. PAUL Anne - must call the office to make appointment [phone] or [phone] PRINCIPAL DISCHARGE DIAGNOSIS  Diagnosis: Closed intertrochanteric fracture of right femur  Assessment and Plan of Treatment: 89year old female is admitted for fall sustained Right hip intertrochanteric fracture underwent surgery Right hip intramedullary nail 4/27/2019 without any intraoperative complications.  Patient is doing well and stable for discharge.  Patient is tolerating physical therapy: weight bearing to Right leg, out of bed for gait training.  Keep dressing on as is until day of office visit.  Staples/sutures if applicable, to be removed in Dr. Barba' office 14 days from date of surgery.  Patient is on home med Xarelto for anticoagulation.  Patient followed by medicine hospitalist and endocrinology during hospital stay.  Diabetes medication was adjusted: Actos was discontinued and modified Lantus 22units at bedtime and Humalog 8U three times a day prior to meals.  Orthopaedic Surgeon Dr. Barba would like patient to call private MD/Internist for appointment postop to maintain continuity of care.  Follow up with Dr. Barba' office in 1-2 weeks.  Please closely monitor patients fingersticks and blood sugars while in rehab  Patient to continue low dose sliding scale which is to be ADJUSTED AND MONITORED while in rehab to patiens needs.  Please discuss diabetes regimen with patient and PMD prior to discharge from rehab to assure patient safety. Please perform insulin teaching and glucometer teaching, nutritional teaching prior to discharge from         SECONDARY DISCHARGE DIAGNOSES  Diagnosis: Type 2 diabetes mellitus with complication, without long-term current use of insulin  Assessment and Plan of Treatment: Poorly controlled Type 2 diabetes mellitus where meds were adjusted by Endocrinology.  Follow up with Dr. PAUL Anne - must call the office to make appointment [phone] or [phone] PRINCIPAL DISCHARGE DIAGNOSIS  Diagnosis: Closed intertrochanteric fracture of right femur  Assessment and Plan of Treatment: 89year old female is admitted for fall sustained Right hip intertrochanteric fracture underwent surgery Right hip intramedullary nail 4/27/2019 without any intraoperative complications.  Patient is doing well and stable for discharge.  Patient is tolerating physical therapy: weight bearing to Right leg, out of bed for gait training.  Keep dressing on as is until day of office visit.  Staples/sutures if applicable, to be removed in Dr. Barba' office 14 days from date of surgery.  Patient is on home med Xarelto for anticoagulation.  Patient followed by medicine hospitalist and endocrinology during hospital stay.  Diabetes medication was adjusted: Actos was discontinued and modified Lantus 22units at bedtime and Humalog 8U three times a day prior to meals.  Orthopaedic Surgeon Dr. Barba would like patient to call private MD/Internist for appointment postop to maintain continuity of care.  Follow up with Dr. Barba' office in 1-2 weeks.  As per ENDOCRINOLOGY DR CASSI ANNE:  Please closely monitor patients fingersticks and blood sugars while in rehab  Patient to continue low dose sliding scale which is to be ADJUSTED AND MONITORED while in rehab to patiens needs.  Please discuss diabetes regimen with patient and PMD prior to discharge from rehab to assure patient safety. Please perform insulin teaching, patient will need glucometer teaching and testing supplies, nutritional teaching prior to discharge from   PLEASE check vitamin D levels in rehab and assess for safety / fall risk per endocrinology         SECONDARY DISCHARGE DIAGNOSES  Diagnosis: Type 2 diabetes mellitus with complication, without long-term current use of insulin  Assessment and Plan of Treatment: Poorly controlled Type 2 diabetes mellitus where meds were adjusted by Endocrinology.  Follow up with Dr. PAUL Anne - must call the office to make appointment [phone] or [phone]

## 2019-04-29 NOTE — DISCHARGE NOTE PROVIDER - NSDCACTIVITY_GEN_ALL_CORE
Do not drive or operate machinery/Do not make important decisions/Walking - Outdoors allowed/Walking - Indoors allowed/No heavy lifting/straining

## 2019-04-29 NOTE — PROGRESS NOTE ADULT - PROBLEM SELECTOR PLAN 4
A1C 11.6  F/U endo consult recs  start lantus 15 and premeal humalog 5  monitor FS, ISS A1C 11.6  FS still uncontrolled  increased lantus 18 qHS, and premeal humalog 8  F/U endo consult recs  monitor FS, ISS

## 2019-04-29 NOTE — PROGRESS NOTE BEHAVIORAL HEALTH - NSBHFUPINTERVALHXFT_PSY_A_CORE
Met with patient who was calm and cooperative.  A&O x4.  She states she is feeling well, slept well, and denies pain.  Does not seem paranoid during the interview and states she is comfortable here and has no problems with staff.  When questioned regarding Paroxetine 20mg prescription, she states she took it for anxiety but does not currently take the medication and hasn't in a few months.  Denies depressive symptoms, anxiety, SI/HI, a/vh. Met with patient who was calm and cooperative.  A&O x4.  She states she is feeling well, slept well, and denies pain.  Does not seem paranoid during the interview and states she is comfortable here and has no problems with staff.  When questioned regarding Paroxetine 20mg prescription, she states she took it for anxiety but does not currently take the medication and hasn't in a few months- unreliable?.  Denies depressive symptoms, anxiety, SI/HI, a/vh.

## 2019-04-29 NOTE — DISCHARGE NOTE PROVIDER - PROVIDER TOKENS
PROVIDER:[TOKEN:[[id number]],FOLLOWUP:[2 weeks]] PROVIDER:[TOKEN:[[id number]],FOLLOWUP:[2 weeks]],PROVIDER:[TOKEN:[[id number]],FOLLOWUP:[1 week]]

## 2019-04-29 NOTE — PROGRESS NOTE BEHAVIORAL HEALTH - NSBHCHARTREVIEWVS_PSY_A_CORE FT
Vital Signs Last 24 Hrs  T(C): 36.9 (29 Apr 2019 14:01), Max: 36.9 (29 Apr 2019 14:01)  T(F): 98.4 (29 Apr 2019 14:01), Max: 98.4 (29 Apr 2019 14:01)  HR: 90 (29 Apr 2019 14:01) (90 - 123)  BP: 136/68 (29 Apr 2019 14:01) (109/65 - 155/102)  BP(mean): --  RR: 19 (29 Apr 2019 14:01) (17 - 20)  SpO2: 97% (29 Apr 2019 14:01) (96% - 98%)

## 2019-04-29 NOTE — PROGRESS NOTE ADULT - PROBLEM SELECTOR PLAN 1
Pt likely has post op delirium  appreciate psych input   Monitor QTc   c/w Haldol prn if QTc < 500ms  fall precaution resolved  most likely secondary to surgery  monitoring mental status

## 2019-04-29 NOTE — PROGRESS NOTE BEHAVIORAL HEALTH - NSBHADMITCOUNSEL_PSY_A_CORE
risk factor reduction/risks and benefits of treatment options/instructions for management, treatment and follow up/importance of adherence to chosen treatment

## 2019-04-29 NOTE — OCCUPATIONAL THERAPY INITIAL EVALUATION ADULT - PLANNED THERAPY INTERVENTIONS, OT EVAL
balance training/bed mobility training/ROM/strengthening/fine motor coordination training/neuromuscular re-education/transfer training/ADL retraining

## 2019-04-29 NOTE — PROGRESS NOTE ADULT - SUBJECTIVE AND OBJECTIVE BOX
Patient is a 89y old  Female who presents with a chief complaint of fall (29 Apr 2019 09:37)      SUBJECTIVE / OVERNIGHT EVENTS:    MEDICATIONS  (STANDING):  acetaminophen   Tablet .. 650 milliGRAM(s) Oral every 6 hours  ascorbic acid 500 milliGRAM(s) Oral daily  calcium carbonate 1250 mG  + Vitamin D (OsCal 500 + D) 1 Tablet(s) Oral daily  diltiazem    milliGRAM(s) Oral daily  docusate sodium 100 milliGRAM(s) Oral three times a day  insulin glargine Injectable (LANTUS) 15 Unit(s) SubCutaneous at bedtime  insulin lispro (HumaLOG) corrective regimen sliding scale   SubCutaneous three times a day before meals  insulin lispro (HumaLOG) corrective regimen sliding scale   SubCutaneous at bedtime  insulin lispro Injectable (HumaLOG) 5 Unit(s) SubCutaneous three times a day with meals  lactated ringers. 1000 milliLiter(s) (30 mL/Hr) IV Continuous <Continuous>  metoprolol tartrate 12.5 milliGRAM(s) Oral two times a day  pantoprazole    Tablet 40 milliGRAM(s) Oral before breakfast  PARoxetine 20 milliGRAM(s) Oral daily  rivaroxaban 15 milliGRAM(s) Oral every 24 hours  simvastatin 10 milliGRAM(s) Oral at bedtime    MEDICATIONS  (PRN):  haloperidol    Injectable 0.5 milliGRAM(s) IntraMuscular every 6 hours PRN Agitation  ondansetron Injectable 4 milliGRAM(s) IV Push every 6 hours PRN Nausea and/or Vomiting  oxyCODONE    IR 5 milliGRAM(s) Oral every 4 hours PRN Severe Pain (7 - 10)  oxyCODONE    IR 2.5 milliGRAM(s) Oral every 4 hours PRN Mild and Moderate pain      Vital Signs Last 24 Hrs  T(C): 36.4 (29 Apr 2019 09:53), Max: 36.8 (28 Apr 2019 16:36)  T(F): 97.6 (29 Apr 2019 09:53), Max: 98.2 (28 Apr 2019 16:36)  HR: 96 (29 Apr 2019 09:53) (96 - 123)  BP: 135/77 (29 Apr 2019 09:53) (109/65 - 155/102)  BP(mean): --  RR: 19 (29 Apr 2019 09:53) (17 - 20)  SpO2: 96% (29 Apr 2019 09:53) (96% - 98%)  CAPILLARY BLOOD GLUCOSE      POCT Blood Glucose.: 315 mg/dL (29 Apr 2019 07:30)  POCT Blood Glucose.: 300 mg/dL (28 Apr 2019 21:34)  POCT Blood Glucose.: 447 mg/dL (28 Apr 2019 16:40)    I&O's Summary    28 Apr 2019 07:01  -  29 Apr 2019 07:00  --------------------------------------------------------  IN: 0 mL / OUT: 1700 mL / NET: -1700 mL        PHYSICAL EXAM:  GENERAL: NAD, well-developed  HEAD:  Atraumatic, Normocephalic  EYES: EOMI, PERRLA, conjunctiva and sclera clear  NECK: Supple, No JVD  CHEST/LUNG: Clear to auscultation bilaterally; No wheeze  HEART: Regular rate and rhythm; No murmurs, rubs, or gallops  ABDOMEN: Soft, Nontender, Nondistended; Bowel sounds present  EXTREMITIES:  2+ Peripheral Pulses, No clubbing, cyanosis, or edema  PSYCH: AAOx3  NEUROLOGY: non-focal  SKIN: No rashes or lesions    LABS:                        12.6   13.87 )-----------( 213      ( 28 Apr 2019 06:20 )             40.5     04-28    137  |  94<L>  |  24<H>  ----------------------------<  317<H>  4.8   |  28  |  1.13    Ca    9.4      28 Apr 2019 06:20                RADIOLOGY & ADDITIONAL TESTS:    Imaging Personally Reviewed:    Consultant(s) Notes Reviewed:      Care Discussed with Consultants/Other Providers: Patient is a 89y old  Female who presents with a chief complaint of fall (29 Apr 2019 09:37)      SUBJECTIVE / OVERNIGHT EVENTS:  Patient has no new complaints. Denies cp, SOB, abdominal pain, N/V/D     MEDICATIONS  (STANDING):  acetaminophen   Tablet .. 650 milliGRAM(s) Oral every 6 hours  ascorbic acid 500 milliGRAM(s) Oral daily  calcium carbonate 1250 mG  + Vitamin D (OsCal 500 + D) 1 Tablet(s) Oral daily  diltiazem    milliGRAM(s) Oral daily  docusate sodium 100 milliGRAM(s) Oral three times a day  insulin glargine Injectable (LANTUS) 15 Unit(s) SubCutaneous at bedtime  insulin lispro (HumaLOG) corrective regimen sliding scale   SubCutaneous three times a day before meals  insulin lispro (HumaLOG) corrective regimen sliding scale   SubCutaneous at bedtime  insulin lispro Injectable (HumaLOG) 5 Unit(s) SubCutaneous three times a day with meals  lactated ringers. 1000 milliLiter(s) (30 mL/Hr) IV Continuous <Continuous>  metoprolol tartrate 12.5 milliGRAM(s) Oral two times a day  pantoprazole    Tablet 40 milliGRAM(s) Oral before breakfast  PARoxetine 20 milliGRAM(s) Oral daily  rivaroxaban 15 milliGRAM(s) Oral every 24 hours  simvastatin 10 milliGRAM(s) Oral at bedtime    MEDICATIONS  (PRN):  haloperidol    Injectable 0.5 milliGRAM(s) IntraMuscular every 6 hours PRN Agitation  ondansetron Injectable 4 milliGRAM(s) IV Push every 6 hours PRN Nausea and/or Vomiting  oxyCODONE    IR 5 milliGRAM(s) Oral every 4 hours PRN Severe Pain (7 - 10)  oxyCODONE    IR 2.5 milliGRAM(s) Oral every 4 hours PRN Mild and Moderate pain      Vital Signs Last 24 Hrs  T(C): 36.4 (29 Apr 2019 09:53), Max: 36.8 (28 Apr 2019 16:36)  T(F): 97.6 (29 Apr 2019 09:53), Max: 98.2 (28 Apr 2019 16:36)  HR: 96 (29 Apr 2019 09:53) (96 - 123)  BP: 135/77 (29 Apr 2019 09:53) (109/65 - 155/102)  BP(mean): --  RR: 19 (29 Apr 2019 09:53) (17 - 20)  SpO2: 96% (29 Apr 2019 09:53) (96% - 98%)  CAPILLARY BLOOD GLUCOSE      POCT Blood Glucose.: 315 mg/dL (29 Apr 2019 07:30)  POCT Blood Glucose.: 300 mg/dL (28 Apr 2019 21:34)  POCT Blood Glucose.: 447 mg/dL (28 Apr 2019 16:40)    I&O's Summary    28 Apr 2019 07:01  -  29 Apr 2019 07:00  --------------------------------------------------------  IN: 0 mL / OUT: 1700 mL / NET: -1700 mL        PHYSICAL EXAM:  GENERAL: NAD, well-developed  HEAD:  Atraumatic, Normocephalic  EYES: EOMI, PERRLA, conjunctiva and sclera clear  NECK: Supple, No JVD  CHEST/LUNG: Clear to auscultation bilaterally; No wheeze  HEART: Regular rate and rhythm; No murmurs, rubs, or gallops  ABDOMEN: Soft, Nontender, Nondistended; Bowel sounds present  EXTREMITIES:  2+ Peripheral Pulses, No clubbing, cyanosis, or edema  PSYCH: AAOx3  NEUROLOGY: non-focal  SKIN: No rashes or lesions    LABS:                        12.6   13.87 )-----------( 213      ( 28 Apr 2019 06:20 )             40.5     04-28    137  |  94<L>  |  24<H>  ----------------------------<  317<H>  4.8   |  28  |  1.13    Ca    9.4      28 Apr 2019 06:20                RADIOLOGY & ADDITIONAL TESTS:    Imaging Personally Reviewed:    Consultant(s) Notes Reviewed:      Care Discussed with Consultants/Other Providers: ortho

## 2019-04-29 NOTE — DISCHARGE NOTE PROVIDER - NSDCCPTREATMENT_GEN_ALL_CORE_FT
PRINCIPAL PROCEDURE  Procedure: Nail fixation of intertrochanteric fracture  Findings and Treatment: dictated operative note  pain control, pain med may cause drowsiness, refrain from activities require decision making, physical therapy to help resume activities of daily living  weight bearing as tolerated to Right leg  call Surgeon's office to make appointment in 1 week Dr. Barba [phone]

## 2019-04-29 NOTE — PROGRESS NOTE BEHAVIORAL HEALTH - GAIT / STATION
Well-Child Checkup: 15 to 25 Years     Stay involved in your teen’s life. Make sure your teen knows you’re always there when he or she needs to talk. During the teen years, it’s important to keep having yearly checkups.  Your teen may be embarrassed · Body changes. The body grows and matures during puberty. Hair will grow in the pubic area and on other parts of the body. Girls grow breasts and menstruate (have monthly periods). A boy’s voice changes, becoming lower and deeper.  As the penis matures, er · Eat healthy. Your child should eat fruits, vegetables, lean meats, and whole grains every day. Less healthy foods—like french fries, candy, and chips—should be eaten rarely.  Some teens fall into the trap of snacking on junk food and fast food throughout · Encourage your teen to keep a consistent bedtime, even on weekends. Sleeping is easier when the body follows a routine. Don’t let your teen stay up too late at night or sleep in too long in the morning. · Help your teen wake up, if needed.  Go into the b · Set rules and limits around driving and use of the car. If your teen gets a ticket or has an accident, there should be consequences. Driving is a privilege that can be taken away if your child doesn’t follow the rules.   · Teach your child to make good de © 0900-4676 [employer] [address] [facility], [facility] [place]. All rights reserved. This information is not intended as a substitute for professional medical care. Always follow your healthcare professional's instructions. Other

## 2019-04-29 NOTE — DISCHARGE NOTE PROVIDER - REASON FOR ADMISSION
fall sustained Right hip intertrochanteric fracture  surgery Right hip intramedullary nail 4/27/2019 fall

## 2019-04-29 NOTE — PROGRESS NOTE ADULT - SUBJECTIVE AND OBJECTIVE BOX
ANESTHESIA POSTOP CHECK    89y Female POSTOP DAY 1 S/P     Vital Signs Last 24 Hrs  T(C): 36.4 (29 Apr 2019 05:02), Max: 37.6 (28 Apr 2019 09:42)  T(F): 97.5 (29 Apr 2019 05:02), Max: 99.6 (28 Apr 2019 09:42)  HR: 123 (29 Apr 2019 05:02) (97 - 123)  BP: 147/102 (29 Apr 2019 05:02) (109/65 - 155/102)  BP(mean): --  RR: 17 (29 Apr 2019 05:02) (17 - 20)  SpO2: 96% (29 Apr 2019 05:02) (95% - 98%)  I&O's Summary    28 Apr 2019 07:01  -  29 Apr 2019 07:00  --------------------------------------------------------  IN: 0 mL / OUT: 1700 mL / NET: -1700 mL        [X ] NO APPARENT ANESTHESIA COMPLICATIONS      Comments:

## 2019-04-29 NOTE — DISCHARGE NOTE PROVIDER - CARE PROVIDERS DIRECT ADDRESSES
,[email or web address],DirectAddress_Unknown

## 2019-04-29 NOTE — PROGRESS NOTE ADULT - PROBLEM SELECTOR PLAN 2
s/p IMN 4/27  Post op care per ortho s/p IMN 4/27  Post op care per ortho  Pain controlled with Oxy IR prn

## 2019-04-29 NOTE — OCCUPATIONAL THERAPY INITIAL EVALUATION ADULT - PERTINENT HX OF CURRENT PROBLEM, REHAB EVAL
89 year old Female hx of afib on xarelto, DM presents s/p mechanical fall c/o severe R hip pain and inability to ambulate after mechanical fall from bed. Pt has a history of left hip fracture s/p IMN by Dr. Vigil 3 years ago. Pt now s/p right IMN. 89 year old Female hx of Afib on Xarelto, DM presents s/p mechanical fall c/o severe R hip pain and inability to ambulate after mechanical fall from bed. Pt has a history of left hip fracture s/p IMN by Dr. Vigil 3 years ago. Pt now s/p right IMN.

## 2019-04-29 NOTE — DISCHARGE NOTE PROVIDER - CARE PROVIDER_API CALL
Shannon Barba (MD; MPH)  Orthopaedic Surgery  [address], [address]  [place] [address]  Phone: [phone]  Fax: [phone]  Follow Up Time: 2 weeks Shannon Barba (MD; MPH)  Orthopaedic Surgery  [address], [address]  [place] [address]  Phone: [phone]  Fax: [phone]  Follow Up Time: 2 weeks    Diamond Anne)  EndocrinologyMetabDiabetes; Internal Medicine  [address] [place] [address]  Phone: [phone]  Fax: [phone]  Follow Up Time: 1 week

## 2019-04-29 NOTE — PROGRESS NOTE BEHAVIORAL HEALTH - SUMMARY
89 y.o. woman with history of afib on xarelto, DM, and essential hypertension who came to the ER for evaluation of right hip pain s/p fall from bed found to have right trochanteric fracture, now surgical fixation 4/27/19. Psych consulted for increased agitation, confusion, and paranoid statements about the nurse being "a [place] spy." Patient received Haldol 0.5 mg IM x 3 (2:28, 9:54, 11:42) today for agitation. On evaluation patient is calm in bed but appears suspicious of the nurses. She is AOx4. She reports feeling fine and wants to leave because she believes the nurses are "up to no good." No psych hx known. No current SI/HI. Interview limited to minimize agitation.    4/29- A&O x4.  Calm, cooperative.  No paranoia.    RECOMMENDATIONS:  - EO  - Called [facility] ([phone]) to investigate Paroxetine prescription.  Abrupt discontinuation of Paroxetine can cause discontinuation syndrome which can lead to confusion.  Pharmacy states prescription was last filled on 4/13/2019 and has been filled every 30 days since December 2018.  - Labs: TSH, folate, B12 levels  - Collaborate care with daughter  - Frequent orientation / blinds open  - NO anticholinergics, benzodiazepines, antihistamines as they can worsen delirium.  - ACUTE AGITATION- Continue Haldol 0.5mg PO/IM/IV Q6h PRN 89 y.o. woman with history of afib on xarelto, DM, and essential hypertension who came to the ER for evaluation of right hip pain s/p fall from bed found to have right trochanteric fracture, now surgical fixation 4/27/19. Psych consulted for increased agitation, confusion, and paranoid statements about the nurse being "a [place] spy." Patient received Haldol 0.5 mg IM x 3 (2:28, 9:54, 11:42) today for agitation. On evaluation patient is calm in bed but appears suspicious of the nurses. She is AOx4. She reports feeling fine and wants to leave because she believes the nurses are "up to no good." No psych hx known. No current SI/HI. Interview limited to minimize agitation.    4/29- A&O x4.  Calm, cooperative.  No paranoia.    RECOMMENDATIONS:  - EO  - Called [facility] ([phone]) to investigate Paroxetine prescription.  Abrupt discontinuation of Paroxetine can cause discontinuation syndrome which can lead to confusion.  Pharmacy states prescription was last filled on 4/13/2019 and has been filled every 30 days since December 2018. Continue Paxil 20mg q AM PO. Would advise that PCP should plan on gradual taper- d/c as an outpatient and attempt on another SSRI.  - Labs: TSH, folate, B12 levels  - Collaborate care with daughter  - Frequent orientation / blinds open  - NO anticholinergics, benzodiazepines, antihistamines as they can worsen delirium.  - ACUTE AGITATION- Continue Haldol 0.5mg PO/IM/IV Q6h PRN

## 2019-04-29 NOTE — PROGRESS NOTE ADULT - SUBJECTIVE AND OBJECTIVE BOX
Ortho Progress Note  JACKSON AGUSTIN   MRN-[id number]    89yFemale is s/p R hip IMN POD#2 w/out any c/o.  Resting comfortably, NAD, 1:1 in the room - aide stated patient slept all night after going on bedpan several times earlier.    Vital Signs Last 24 Hrs  T(C): 36.4 (29 Apr 2019 05:02), Max: 37.6 (28 Apr 2019 09:42)  T(F): 97.5 (29 Apr 2019 05:02), Max: 99.6 (28 Apr 2019 09:42)  HR: 123 (29 Apr 2019 05:02) (97 - 123)  BP: 147/102 (29 Apr 2019 05:02) (109/65 - 155/102)  BP(mean): --  RR: 17 (29 Apr 2019 05:02) (17 - 20)  SpO2: 96% (29 Apr 2019 05:02) (95% - 98%)  No Known Allergies      S/P R hip IMN   R hip and thigh wound dressings are C/D/I  motor and sensation limited exam as patient sleepy and did not follow commands                          12.6   13.87 )-----------( 213      ( 28 Apr 2019 06:20 )             40.5     04-28    137  |  94<L>  |  24<H>  ----------------------------<  317<H>  4.8   |  28  |  1.13    Ca    9.4      28 Apr 2019 06:20          A/P Ortho Stable s/p R hip IMN POD#2  ck Labs this am  continue Xarelto for anticoagulation   continue Physical Therapy BID: WBAT, out of bed for gait training  discharge planning to rehab once postop delirium improves Ortho Progress Note  JACKSON AGUSTIN   MRN-[id number]    89yFemale is s/p R hip IMN POD#2 w/out any c/o.  Resting comfortably, NAD, 1:1 in the room - aide stated patient slept all night after going on bedpan several times earlier.    Vital Signs Last 24 Hrs  T(C): 36.4 (29 Apr 2019 05:02), Max: 37.6 (28 Apr 2019 09:42)  T(F): 97.5 (29 Apr 2019 05:02), Max: 99.6 (28 Apr 2019 09:42)  HR: 123 (29 Apr 2019 05:02) (97 - 123)  BP: 147/102 (29 Apr 2019 05:02) (109/65 - 155/102)  BP(mean): --  RR: 17 (29 Apr 2019 05:02) (17 - 20)  SpO2: 96% (29 Apr 2019 05:02) (95% - 98%)  No Known Allergies      S/P R hip IMN   R hip and thigh wound dressings are C/D/I  motor and sensation limited exam as patient sleepy and did not follow commands                          12.6   13.87 )-----------( 213      ( 28 Apr 2019 06:20 )             40.5     04-28    137  |  94<L>  |  24<H>  ----------------------------<  317<H>  4.8   |  28  |  1.13    Ca    9.4      28 Apr 2019 06:20          A/P Ortho Stable s/p R hip IMN POD#2  ck Labs this am  continue Xarelto for anticoagulation   continue Physical Therapy BID: WBAT, out of bed for gait training  discharge planning to rehab once postop delirium improves - psych to follow up further psychiatric safety assessment prior to discharge

## 2019-04-29 NOTE — PROGRESS NOTE ADULT - SUBJECTIVE AND OBJECTIVE BOX
Subjective: no chest pain or sob   	  MEDICATIONS:  MEDICATIONS  (STANDING):  acetaminophen   Tablet .. 650 milliGRAM(s) Oral every 6 hours  ascorbic acid 500 milliGRAM(s) Oral daily  calcium carbonate 1250 mG  + Vitamin D (OsCal 500 + D) 1 Tablet(s) Oral daily  diltiazem    milliGRAM(s) Oral daily  docusate sodium 100 milliGRAM(s) Oral three times a day  insulin glargine Injectable (LANTUS) 15 Unit(s) SubCutaneous at bedtime  insulin lispro (HumaLOG) corrective regimen sliding scale   SubCutaneous three times a day before meals  insulin lispro (HumaLOG) corrective regimen sliding scale   SubCutaneous at bedtime  insulin lispro Injectable (HumaLOG) 5 Unit(s) SubCutaneous three times a day with meals  lactated ringers. 1000 milliLiter(s) (30 mL/Hr) IV Continuous <Continuous>  metoprolol tartrate 12.5 milliGRAM(s) Oral two times a day  pantoprazole    Tablet 40 milliGRAM(s) Oral before breakfast  PARoxetine 20 milliGRAM(s) Oral daily  rivaroxaban 15 milliGRAM(s) Oral every 24 hours  simvastatin 10 milliGRAM(s) Oral at bedtime      LABS:	 	    CARDIAC MARKERS:                                12.6   13.87 )-----------( 213      ( 28 Apr 2019 06:20 )             40.5     04-28    137  |  94<L>  |  24<H>  ----------------------------<  317<H>  4.8   |  28  |  1.13    Ca    9.4      28 Apr 2019 06:20      proBNP:   Lipid Profile:   HgA1c:   TSH:       PHYSICAL EXAM:  T(C): 36.4 (04-29-19 @ 05:02), Max: 37.6 (04-28-19 @ 09:42)  HR: 123 (04-29-19 @ 05:02) (97 - 123)  BP: 147/102 (04-29-19 @ 05:02) (109/65 - 155/102)  RR: 17 (04-29-19 @ 05:02) (17 - 20)  SpO2: 96% (04-29-19 @ 05:02) (95% - 98%)  Wt(kg): --  I&O's Summary    28 Apr 2019 07:01  -  29 Apr 2019 07:00  --------------------------------------------------------  IN: 0 mL / OUT: 1700 mL / NET: -1700 mL      Heart: normal S1, S2, RRR, no m/r/g  Lungs: cta b/l  Abd: soft nT  Ext: no edema     TELEMETRY: 	    ECG:  	  RADIOLOGY:   DIAGNOSTIC TESTING:  [ ] Echocardiogram:   [ ]  Catheterization:   [ ] Stress Test:    OTHER: 	      ASSESSMENT/PLAN: 89y Female hx of afib on xarelto, HTN, HLD, DM presents s/p mech fall c/o severe R hip pain and inability to ambulate after mechanical fall from bed today. She has a history of left hip fracture s/p IMN by Dr. Vigil 3 years ago.  She is now s/p R IMN of a R IT fracture on 4/27    --tolerated procedure well from CV perspective  --her last echo in my office revealed preserved LV function  --no clinical heart failure or anginal symptoms   --f/u psych eval  --no further inpatient cardiac work up planned    Sloane Castillo MD

## 2019-04-30 NOTE — PROGRESS NOTE ADULT - SUBJECTIVE AND OBJECTIVE BOX
Patient is a 89y old  Female who presents with a chief complaint of fall (30 Apr 2019 06:10)      SUBJECTIVE / OVERNIGHT EVENTS:  Patient has no new complaints. Denies cp, SOB, abdominal pain, N/V/D     MEDICATIONS  (STANDING):  acetaminophen   Tablet .. 650 milliGRAM(s) Oral every 6 hours  ascorbic acid 500 milliGRAM(s) Oral daily  calcium carbonate 1250 mG  + Vitamin D (OsCal 500 + D) 1 Tablet(s) Oral daily  diltiazem    milliGRAM(s) Oral daily  docusate sodium 100 milliGRAM(s) Oral three times a day  insulin glargine Injectable (LANTUS) 18 Unit(s) SubCutaneous at bedtime  insulin lispro (HumaLOG) corrective regimen sliding scale   SubCutaneous three times a day before meals  insulin lispro (HumaLOG) corrective regimen sliding scale   SubCutaneous at bedtime  insulin lispro Injectable (HumaLOG) 8 Unit(s) SubCutaneous three times a day before meals  lactated ringers. 1000 milliLiter(s) (30 mL/Hr) IV Continuous <Continuous>  metoprolol tartrate 12.5 milliGRAM(s) Oral two times a day  pantoprazole    Tablet 40 milliGRAM(s) Oral before breakfast  PARoxetine 20 milliGRAM(s) Oral daily  rivaroxaban 15 milliGRAM(s) Oral every 24 hours  simvastatin 10 milliGRAM(s) Oral at bedtime    MEDICATIONS  (PRN):  haloperidol    Injectable 0.5 milliGRAM(s) IntraMuscular every 6 hours PRN Agitation  ondansetron Injectable 4 milliGRAM(s) IV Push every 6 hours PRN Nausea and/or Vomiting  oxyCODONE    IR 5 milliGRAM(s) Oral every 4 hours PRN Severe Pain (7 - 10)  oxyCODONE    IR 2.5 milliGRAM(s) Oral every 4 hours PRN Mild and Moderate pain      Vital Signs Last 24 Hrs  T(C): 36.7 (30 Apr 2019 09:04), Max: 36.9 (29 Apr 2019 14:01)  T(F): 98.1 (30 Apr 2019 09:04), Max: 98.4 (29 Apr 2019 14:01)  HR: 93 (30 Apr 2019 09:04) (77 - 100)  BP: 121/80 (30 Apr 2019 09:04) (121/80 - 150/86)  BP(mean): --  RR: 18 (30 Apr 2019 09:04) (18 - 19)  SpO2: 98% (30 Apr 2019 09:04) (96% - 98%)  CAPILLARY BLOOD GLUCOSE      POCT Blood Glucose.: 234 mg/dL (30 Apr 2019 07:39)  POCT Blood Glucose.: 260 mg/dL (29 Apr 2019 21:45)  POCT Blood Glucose.: 135 mg/dL (29 Apr 2019 17:37)  POCT Blood Glucose.: 202 mg/dL (29 Apr 2019 11:38)    I&O's Summary    29 Apr 2019 07:01  -  30 Apr 2019 07:00  --------------------------------------------------------  IN: 0 mL / OUT: 1200 mL / NET: -1200 mL        PHYSICAL EXAM:  GENERAL: NAD, well-developed  HEAD:  Atraumatic, Normocephalic  EYES: EOMI, PERRLA, conjunctiva and sclera clear  NECK: Supple, No JVD  CHEST/LUNG: Clear to auscultation bilaterally; No wheeze  HEART: Regular rate and rhythm; No murmurs, rubs, or gallops  ABDOMEN: Soft, Nontender, Nondistended; Bowel sounds present  EXTREMITIES:  2+ Peripheral Pulses, No clubbing, cyanosis, or edema  PSYCH: AAOx3  NEUROLOGY: non-focal  SKIN: No rashes or lesions    LABS:                    RADIOLOGY & ADDITIONAL TESTS:    Imaging Personally Reviewed:    Consultant(s) Notes Reviewed:      Care Discussed with Consultants/Other Providers: Ortho

## 2019-04-30 NOTE — PROGRESS NOTE ADULT - SUBJECTIVE AND OBJECTIVE BOX
Subjective: no chest pain or sob; complains of right knee pain    	    acetaminophen   Tablet .. 650 milliGRAM(s) Oral every 6 hours  ascorbic acid 500 milliGRAM(s) Oral daily  calcium carbonate 1250 mG  + Vitamin D (OsCal 500 + D) 1 Tablet(s) Oral daily  diltiazem    milliGRAM(s) Oral daily  docusate sodium 100 milliGRAM(s) Oral three times a day  haloperidol    Injectable 0.5 milliGRAM(s) IntraMuscular every 6 hours PRN  insulin glargine Injectable (LANTUS) 18 Unit(s) SubCutaneous at bedtime  insulin lispro (HumaLOG) corrective regimen sliding scale   SubCutaneous three times a day before meals  insulin lispro (HumaLOG) corrective regimen sliding scale   SubCutaneous at bedtime  insulin lispro Injectable (HumaLOG) 8 Unit(s) SubCutaneous three times a day before meals  lactated ringers. 1000 milliLiter(s) IV Continuous <Continuous>  metoprolol tartrate 12.5 milliGRAM(s) Oral two times a day  ondansetron Injectable 4 milliGRAM(s) IV Push every 6 hours PRN  oxyCODONE    IR 5 milliGRAM(s) Oral every 4 hours PRN  oxyCODONE    IR 2.5 milliGRAM(s) Oral every 4 hours PRN  pantoprazole    Tablet 40 milliGRAM(s) Oral before breakfast  PARoxetine 20 milliGRAM(s) Oral daily  rivaroxaban 15 milliGRAM(s) Oral every 24 hours  simvastatin 10 milliGRAM(s) Oral at bedtime                        T(C): 36.7 (04-30-19 @ 09:04), Max: 36.9 (04-29-19 @ 14:01)  HR: 93 (04-30-19 @ 09:04) (77 - 100)  BP: 121/80 (04-30-19 @ 09:04) (121/80 - 150/86)  RR: 18 (04-30-19 @ 09:04) (18 - 19)  SpO2: 98% (04-30-19 @ 09:04) (96% - 98%)  Wt(kg): --    I&O's Summary    29 Apr 2019 07:01  -  30 Apr 2019 07:00  --------------------------------------------------------  IN: 0 mL / OUT: 1200 mL / NET: -1200 mL        Heart: normal S1, S2, RRR, no m/r/g  Lungs: cta b/l  Abd: soft nT  Ext: no edema     TELEMETRY: 	    ECG:  	  RADIOLOGY:   DIAGNOSTIC TESTING:  [ ] Echocardiogram:   [ ]  Catheterization:   [ ] Stress Test:    OTHER: 	      ASSESSMENT/PLAN: 89y Female hx of afib on xarelto, HTN, HLD, DM presents s/p mech fall c/o severe R hip pain and inability to ambulate after mechanical fall from bed today. She has a history of left hip fracture s/p IMN by Dr. Vigil 3 years ago.  She is now s/p R IMN of a R IT fracture on 4/27    --tolerated procedure well from CV perspective  --her last echo showed preserved LV function  --no clinical heart failure or anginal symptoms   --f/u psych eval  --no further inpatient cardiac work up planned    Sloane Castillo MD

## 2019-04-30 NOTE — CONSULT NOTE ADULT - PROBLEM SELECTOR PROBLEM 2
Closed nondisplaced intertrochanteric fracture of right femur, initial encounter
Atrial fibrillation, unspecified type

## 2019-04-30 NOTE — PROGRESS NOTE ADULT - PROBLEM SELECTOR PLAN 4
A1C 11.6  FS improved  increased lantus 18 qHS, and premeal humalog 8 on 4/29   F/U endo consult recs  monitor FS, ISS

## 2019-04-30 NOTE — CONSULT NOTE ADULT - SUBJECTIVE AND OBJECTIVE BOX
Reason For Consult: DM    HPI:  89y Female hx of afib on xarelto, DM [facility]/o severe R hip pain and inability to ambulate after mechanical fall from bed today. She has a history of left hip fracture s/p IMN by Dr. Vigil in 3 years ago.  Patient denies headstrike or LOC. Patient denies radiation of pain. Patient denies numbness/tingling/burning in the RLE. No other bone/joint complaints. Patient is a community ambulator    endocrine called for assitance with DM  pt states she is 89 and has DM for years and "knows what to do" and doesnt want to make changes.   She does not know her home meds, but when told names she could verify actos, glipizide, januvia.   she does not check FSBG. States diet is well controlled and balanced and she cooks her own food.  pt lives by self and has no help at home per pt.  She follows with PCP and it is unclear if she follows with optho or podiatry and does not report any known complications of DM    While inpt, she was foudn to have elevated A1C of 11, and was started on basal bolus.      PAST MEDICAL & SURGICAL HISTORY:  Atrial fibrillation, unspecified type  HLD (hyperlipidemia)  DM2 (diabetes mellitus, type 2)  HTN (hypertension)  Arthritis involving multiple sites  S/P hip replacement        FAMILY HISTORY:  No pertinent family history in first degree relatives      Social History:  lives by self     Outpatient Medications:  Home Medications:   * Patient Currently Takes Medications as of 23-Oct-2018 15:07 documented in Structured Notes  · 	pioglitazone 30 mg oral tablet: 1 tab(s) orally once a day  · 	pantoprazole 40 mg oral delayed release tablet: 1 tab(s) orally once a day (before a meal)  · 	acetaminophen 325 mg oral tablet: 2 tab(s) orally every 6 hours, As needed, Temp greater or equal to 38C (100.4F)  · 	simvastatin 10 mg oral tablet: 1 tab(s) orally once a day (at bedtime)  · 	DilTIAZem Hydrochloride  mg/24 hours oral capsule, extended release: 1 cap(s) orally once a day  · 	hydroCHLOROthiazide 25 mg oral tablet: 1 tab(s) orally once a day  · 	Januvia 50 mg oral tablet: 1 tab(s) orally once a day  · 	triamcinolone 0.1% topical cream: Apply topically to affected area on arms, chest, and legs 2 times a day  · 	Artificial Tears ophthalmic solution: 1 drop(s) to each affected eye 2 times a day, As Needed  · 	PARoxetine 20 mg oral tablet: 1 tab(s) orally once a day  · 	metoprolol succinate 25 mg oral tablet, extended release: 1 tab(s) orally once a day  · 	Xarelto 15 mg oral tablet: 1 tab(s) orally once a day (in the evening)  · 	glipiZIDE 10 mg oral tablet: 1 tab(s) orally once a day      MEDICATIONS  (STANDING):  acetaminophen   Tablet .. 650 milliGRAM(s) Oral every 6 hours  ascorbic acid 500 milliGRAM(s) Oral daily  calcium carbonate 1250 mG  + Vitamin D (OsCal 500 + D) 1 Tablet(s) Oral daily  diltiazem    milliGRAM(s) Oral daily  docusate sodium 100 milliGRAM(s) Oral three times a day  insulin glargine Injectable (LANTUS) 22 Unit(s) SubCutaneous at bedtime  insulin lispro (HumaLOG) corrective regimen sliding scale   SubCutaneous three times a day before meals  insulin lispro (HumaLOG) corrective regimen sliding scale   SubCutaneous at bedtime  insulin lispro Injectable (HumaLOG) 8 Unit(s) SubCutaneous three times a day before meals  lactated ringers. 1000 milliLiter(s) (30 mL/Hr) IV Continuous <Continuous>  metoprolol tartrate 12.5 milliGRAM(s) Oral two times a day  pantoprazole    Tablet 40 milliGRAM(s) Oral before breakfast  PARoxetine 20 milliGRAM(s) Oral daily  rivaroxaban 15 milliGRAM(s) Oral every 24 hours  simvastatin 10 milliGRAM(s) Oral at bedtime    MEDICATIONS  (PRN):  haloperidol    Injectable 0.5 milliGRAM(s) IntraMuscular every 6 hours PRN Agitation  ondansetron Injectable 4 milliGRAM(s) IV Push every 6 hours PRN Nausea and/or Vomiting  oxyCODONE    IR 5 milliGRAM(s) Oral every 4 hours PRN Severe Pain (7 - 10)  oxyCODONE    IR 2.5 milliGRAM(s) Oral every 4 hours PRN Mild and Moderate pain      Allergies    No Known Allergies    Intolerances      Review of Systems:  Constitutional: No fever  Eyes: No blurry vision  Neuro: No tremors  HEENT: No pain  Cardiovascular: No chest pain, palpitations  Respiratory: No SOB, no cough  GI: No nausea, vomiting, abdominal pain  : No dysuria  Skin: no rash  Psych: no depression  Endocrine: no polyuria, polydipsia  Hem/lymph: no swelling  Osteoporosis: + fractures    ALL OTHER SYSTEMS REVIEWED AND NEGATIVE        PHYSICAL EXAM:  VITALS: T(C): 36.7 (04-30-19 @ 09:04)  T(F): 98.1 (04-30-19 @ 09:04), Max: 98.1 (04-30-19 @ 09:04)  HR: 93 (04-30-19 @ 09:04) (77 - 100)  BP: 121/80 (04-30-19 @ 09:04) (121/80 - 150/86)  RR:  (18 - 18)  SpO2:  (96% - 98%)  Wt(kg): --  GENERAL: NAD, well-groomed, well-developed  EYES: No proptosis, no lid lag, anicteric  HEENT:  Atraumatic, Normocephalic, moist mucous membranes  RESPIRATORY: Clear to auscultation bilaterally; No rales, rhonchi, wheezing, or rubs  CARDIOVASCULAR: Regular rate and rhythm; No murmurs; no peripheral edema  GI: Soft, nontender, non distended, normal bowel sounds  SKIN: Dry, intact, No rashes or lesions  MUSCULOSKELETAL: unable to assess as pt s/p fx repair, able to move UE without issues   NEURO: sensation intact, extraocular movements intact, no tremor, normal reflexes  PSYCH: Alert and oriented x 3, normal affect, normal mood      POCT Blood Glucose.: 112 mg/dL (04-30-19 @ 16:46)  POCT Blood Glucose.: 172 mg/dL (04-30-19 @ 11:44)  POCT Blood Glucose.: 234 mg/dL (04-30-19 @ 07:39)  POCT Blood Glucose.: 260 mg/dL (04-29-19 @ 21:45)  POCT Blood Glucose.: 135 mg/dL (04-29-19 @ 17:37)  POCT Blood Glucose.: 202 mg/dL (04-29-19 @ 11:38)  POCT Blood Glucose.: 315 mg/dL (04-29-19 @ 07:30)  POCT Blood Glucose.: 300 mg/dL (04-28-19 @ 21:34)  POCT Blood Glucose.: 447 mg/dL (04-28-19 @ 16:40)  POCT Blood Glucose.: 272 mg/dL (04-28-19 @ 07:39)  POCT Blood Glucose.: 307 mg/dL (04-28-19 @ 00:39)  POCT Blood Glucose.: 298 mg/dL (04-27-19 @ 22:55)                            12.6   13.87 )-----------( 213      ( 28 Apr 2019 06:20 )             40.5       04-28    137  |  94<L>  |  24<H>  ----------------------------<  317<H>  4.8   |  28  |  1.13    EGFR if : 50  EGFR if non : 43    Ca    9.4      04-28        Thyroid Function Tests:  04-30 @ 06:40 TSH 1.30 FreeT4 -- T3 -- Anti TPO -- Anti Thyroglobulin Ab -- TSI --      Hemoglobin A1C, Whole Blood: 11.6 % <H> [4.0 - 5.6] (04-28-19 @ 06:20)          Radiology:

## 2019-04-30 NOTE — CONSULT NOTE ADULT - ASSESSMENT
89y Female hx of afib on xarelto, DM on orals only, uncontrolled A1C at 11,  history of left hip fracture s/p IMN, presents s/p mech fall c/o severe R hip pain s/p R IMN of a R IT fracture on 4/27.  endocrine called for assitance with DM and hyperglcyemia       #Diabetes   check FSBG TID qac and hs  carb consistent diet   -increase  Lantus  22 u qhs  -Humalog 8-8-8   -low dose SS TIDAC/qhs  - RDE consult inpt     For DC: TBD ideally needs basal +oral (januvia)   I had a long discussion with the pt about her uncontrolled A1C on 3 orals and that she needs better glycemic control to help with wound healing and health.  I dicussed the need for insulin (just basal) and pt refuses with no clear reason except "i am 89 and dont want to change my routine". explained my concern for marked hyperglycemia at home based on A1C and that insulin ideally should be implemented given that 3 orals are not helping to control the DM.  Would not dc on Actos, (there is an increased risk of fracture and this risk increased with longer duration of thiazolidinedione use (4 years or more))  would encourage ongoing discussion about need for insulin.   Short term plan is rehab and therefore would dc on basal bolus to rehab however would recommend glucometer and insulin teaching inpt if pt is amenable     #Fractures  check Vit D level   will need close followup outpt, high risk for fx   PT , falls assessment,   ideally pt would benefit from bisphonates tx and or prolia given hx of fracture and current fx and should be discussed outpt

## 2019-04-30 NOTE — CONSULT NOTE ADULT - ATTENDING COMMENTS
Patient seen and examined, agree with above assessment and plan as transcribed above.    - tolerated procedure    Espinoza Mcconnell MD, [facility]  BEEPER [phone]
Diamond Anne MD  Pager [phone] ([facility])/ [phone] ([facility]) [please provide 10 digit call back number]  Nights and weekends: [phone]  Please note that this patient may be followed by a different provider tomorrow. If no answer or after hours, please contact [phone].  For final dc reccomendations, please call [phone] or page the endocrine fellow on call.

## 2019-04-30 NOTE — PROGRESS NOTE ADULT - SUBJECTIVE AND OBJECTIVE BOX
Ortho Progress Note    S: Patient seen and examined. No acute events overnight. Pain well controlled with current regimen. Denies  CP/SOB.       O:  Physical Exam:  Gen: Laying in bed, NAD, alert and oriented.   Resp: Unlabored breathing  Ext: EHL/FHL/TA/Sol intact          + SILT DP/SP/YOUNG/Sa/Tib          +DP, extremity WWP  Incision: c/d/i, no erythema or edema      Vital Signs Last 24 Hrs  T(C): 36.6 (30 Apr 2019 01:19), Max: 36.9 (29 Apr 2019 14:01)  T(F): 97.9 (30 Apr 2019 01:19), Max: 98.4 (29 Apr 2019 14:01)  HR: 77 (30 Apr 2019 01:19) (77 - 100)  BP: 144/76 (30 Apr 2019 01:19) (135/77 - 150/81)  BP(mean): --  RR: 18 (30 Apr 2019 01:19) (18 - 19)  SpO2: 98% (30 Apr 2019 01:19) (96% - 98%)                          12.6   13.87 )-----------( 213      ( 28 Apr 2019 06:20 )             40.5       04-28    137  |  94<L>  |  24<H>  ----------------------------<  317<H>  4.8   |  28  |  1.13

## 2019-05-01 NOTE — PROVIDER CONTACT NOTE (OTHER) - ASSESSMENT
Pt becoming extremely agitated, very confused and pulling out all lines. Pt at risk just had R HIP IM NAil.
Temp 98.2  /87 RR 17 97% on O2 on RA. Blood sugar 447
No signs and symptoms of hyperglycemia.
Patient had initial BP of 171/100. Retook BP manually and was 172/76. HR was jumping from initially from  and after a few minutes 75 to 133.
throughout shift pt has been refusing to take any type of medication or allow fingersticks. Pt is extremelly agitated and restless. pt gets aggressive and does not allow RN or PCA to do anything.

## 2019-05-01 NOTE — PROGRESS NOTE ADULT - PROBLEM SELECTOR PLAN 4
A1C 11.6  FS improved  increased lantus 22 qHS, and premeal humalog 8 on 4/30 as per endo  F/U endo consult recs  monitor FS, ISS

## 2019-05-01 NOTE — DISCHARGE NOTE NURSING/CASE MANAGEMENT/SOCIAL WORK - NSDPDISTO_GEN_ALL_CORE
Acute rehab Right hip dressing c/d/i  Toes warm and mobile.  +NVS  tolerated po and fluids/Acute rehab

## 2019-05-01 NOTE — PROGRESS NOTE ADULT - SUBJECTIVE AND OBJECTIVE BOX
Subjective: no chest pain or sob      MEDICATIONS  (STANDING):  acetaminophen   Tablet .. 650 milliGRAM(s) Oral every 6 hours  ascorbic acid 500 milliGRAM(s) Oral daily  calcium carbonate 1250 mG  + Vitamin D (OsCal 500 + D) 1 Tablet(s) Oral daily  diltiazem    milliGRAM(s) Oral daily  docusate sodium 100 milliGRAM(s) Oral three times a day  insulin glargine Injectable (LANTUS) 22 Unit(s) SubCutaneous at bedtime  insulin lispro (HumaLOG) corrective regimen sliding scale   SubCutaneous three times a day before meals  insulin lispro (HumaLOG) corrective regimen sliding scale   SubCutaneous at bedtime  insulin lispro Injectable (HumaLOG) 8 Unit(s) SubCutaneous three times a day before meals  lactated ringers. 1000 milliLiter(s) (30 mL/Hr) IV Continuous <Continuous>  metoprolol tartrate 12.5 milliGRAM(s) Oral two times a day  pantoprazole    Tablet 40 milliGRAM(s) Oral before breakfast  PARoxetine 20 milliGRAM(s) Oral daily  rivaroxaban 15 milliGRAM(s) Oral every 24 hours  simvastatin 10 milliGRAM(s) Oral at bedtime    MEDICATIONS  (PRN):  ondansetron Injectable 4 milliGRAM(s) IV Push every 6 hours PRN Nausea and/or Vomiting  oxyCODONE    IR 5 milliGRAM(s) Oral every 4 hours PRN Severe Pain (7 - 10)  oxyCODONE    IR 2.5 milliGRAM(s) Oral every 4 hours PRN Mild and Moderate pain      LABS:    Hemoglobin: 12.6 g/dL (04-28 @ 06:20)  Hemoglobin: 13.2 g/dL (04-27 @ 15:00)  Hemoglobin: 14.8 g/dL (04-27 @ 05:37)  Hemoglobin: 14.3 g/dL (04-26 @ 17:50)       PHYSICAL EXAM  Vital Signs Last 24 Hrs  T(C): 36.8 (01 May 2019 09:45), Max: 37 (30 Apr 2019 17:04)  T(F): 98.3 (01 May 2019 09:45), Max: 98.6 (30 Apr 2019 17:04)  HR: 83 (01 May 2019 09:45) (83 - 108)  BP: 120/70 (01 May 2019 09:45) (120/70 - 172/76)  RR: 17 (01 May 2019 09:45) (17 - 18)  SpO2: 97% (01 May 2019 09:45) (97% - 99%)    Heart: normal S1, S2, RRR, no m/r/g  Lungs: cta b/l  Abd: soft nT  Ext: no edema     ASSESSMENT/PLAN: 89y Female hx of afib on renally dosed xarelto (15mg), HTN, HLD, DM presents s/p mech fall c/o severe R hip pain and inability to ambulate after mechanical fall. She has a history of left hip fracture s/p IMN by Dr. Vigil 3 years ago.  She is now s/p R IMN of a R IT fracture on 4/27    --tolerated procedure well from CV perspective  --her last echo showed preserved LV function  --no clinical heart failure or anginal symptoms   --no further inpatient cardiac work up planned  --Cont Xarelto 15mg daily  --pt on Toprol XL 25mg daily and Cardizem Cd 240mg Daily at home, can continue at DC  --Will arrange f/u with Dr Lopes after RAMIREZ [phone]

## 2019-05-01 NOTE — PROGRESS NOTE ADULT - SUBJECTIVE AND OBJECTIVE BOX
Patient is a 89y old  Female who presents with a chief complaint of fall (01 May 2019 10:38)      SUBJECTIVE / OVERNIGHT EVENTS:  Patient has no new complaints. Denies cp, SOB, abdominal pain, N/V/D     MEDICATIONS  (STANDING):  acetaminophen   Tablet .. 650 milliGRAM(s) Oral every 6 hours  ascorbic acid 500 milliGRAM(s) Oral daily  calcium carbonate 1250 mG  + Vitamin D (OsCal 500 + D) 1 Tablet(s) Oral daily  diltiazem    milliGRAM(s) Oral daily  docusate sodium 100 milliGRAM(s) Oral three times a day  insulin glargine Injectable (LANTUS) 22 Unit(s) SubCutaneous at bedtime  insulin lispro (HumaLOG) corrective regimen sliding scale   SubCutaneous three times a day before meals  insulin lispro (HumaLOG) corrective regimen sliding scale   SubCutaneous at bedtime  insulin lispro Injectable (HumaLOG) 8 Unit(s) SubCutaneous three times a day before meals  lactated ringers. 1000 milliLiter(s) (30 mL/Hr) IV Continuous <Continuous>  metoprolol tartrate 12.5 milliGRAM(s) Oral two times a day  pantoprazole    Tablet 40 milliGRAM(s) Oral before breakfast  PARoxetine 20 milliGRAM(s) Oral daily  rivaroxaban 15 milliGRAM(s) Oral every 24 hours  simvastatin 10 milliGRAM(s) Oral at bedtime    MEDICATIONS  (PRN):  ondansetron Injectable 4 milliGRAM(s) IV Push every 6 hours PRN Nausea and/or Vomiting  oxyCODONE    IR 5 milliGRAM(s) Oral every 4 hours PRN Severe Pain (7 - 10)  oxyCODONE    IR 2.5 milliGRAM(s) Oral every 4 hours PRN Mild and Moderate pain      Vital Signs Last 24 Hrs  T(C): 36.8 (01 May 2019 09:45), Max: 37 (30 Apr 2019 17:04)  T(F): 98.3 (01 May 2019 09:45), Max: 98.6 (30 Apr 2019 17:04)  HR: 83 (01 May 2019 09:45) (83 - 108)  BP: 120/70 (01 May 2019 09:45) (120/70 - 172/76)  BP(mean): --  RR: 17 (01 May 2019 09:45) (17 - 18)  SpO2: 97% (01 May 2019 09:45) (97% - 99%)  CAPILLARY BLOOD GLUCOSE      POCT Blood Glucose.: 119 mg/dL (01 May 2019 07:27)  POCT Blood Glucose.: 100 mg/dL (30 Apr 2019 21:15)  POCT Blood Glucose.: 112 mg/dL (30 Apr 2019 16:46)  POCT Blood Glucose.: 172 mg/dL (30 Apr 2019 11:44)    I&O's Summary    30 Apr 2019 07:01  -  01 May 2019 07:00  --------------------------------------------------------  IN: 0 mL / OUT: 350 mL / NET: -350 mL    01 May 2019 07:01  -  01 May 2019 11:38  --------------------------------------------------------  IN: 0 mL / OUT: 300 mL / NET: -300 mL        PHYSICAL EXAM:  GENERAL: NAD, well-developed  HEAD:  Atraumatic, Normocephalic  EYES: EOMI, PERRLA, conjunctiva and sclera clear  NECK: Supple, No JVD  CHEST/LUNG: Clear to auscultation bilaterally; No wheeze  HEART: Regular rate and rhythm; No murmurs, rubs, or gallops  ABDOMEN: Soft, Nontender, Nondistended; Bowel sounds present  EXTREMITIES:  2+ Peripheral Pulses, No clubbing, cyanosis, or edema  PSYCH: AAOx3  NEUROLOGY: non-focal  SKIN: No rashes or lesions    LABS:                    RADIOLOGY & ADDITIONAL TESTS:    Imaging Personally Reviewed:    Consultant(s) Notes Reviewed:      Care Discussed with Consultants/Other Providers: Ortho Patient is a 89y old  Female who presents with a chief complaint of fall (01 May 2019 10:38)      SUBJECTIVE / OVERNIGHT EVENTS:  Patient has no new complaints. Denies cp, SOB, abdominal pain, N/V/D     MEDICATIONS  (STANDING):  acetaminophen   Tablet .. 650 milliGRAM(s) Oral every 6 hours  ascorbic acid 500 milliGRAM(s) Oral daily  calcium carbonate 1250 mG  + Vitamin D (OsCal 500 + D) 1 Tablet(s) Oral daily  diltiazem    milliGRAM(s) Oral daily  docusate sodium 100 milliGRAM(s) Oral three times a day  insulin glargine Injectable (LANTUS) 22 Unit(s) SubCutaneous at bedtime  insulin lispro (HumaLOG) corrective regimen sliding scale   SubCutaneous three times a day before meals  insulin lispro (HumaLOG) corrective regimen sliding scale   SubCutaneous at bedtime  insulin lispro Injectable (HumaLOG) 8 Unit(s) SubCutaneous three times a day before meals  lactated ringers. 1000 milliLiter(s) (30 mL/Hr) IV Continuous <Continuous>  metoprolol tartrate 12.5 milliGRAM(s) Oral two times a day  pantoprazole    Tablet 40 milliGRAM(s) Oral before breakfast  PARoxetine 20 milliGRAM(s) Oral daily  rivaroxaban 15 milliGRAM(s) Oral every 24 hours  simvastatin 10 milliGRAM(s) Oral at bedtime    MEDICATIONS  (PRN):  ondansetron Injectable 4 milliGRAM(s) IV Push every 6 hours PRN Nausea and/or Vomiting  oxyCODONE    IR 5 milliGRAM(s) Oral every 4 hours PRN Severe Pain (7 - 10)  oxyCODONE    IR 2.5 milliGRAM(s) Oral every 4 hours PRN Mild and Moderate pain      Vital Signs Last 24 Hrs  T(C): 36.8 (01 May 2019 09:45), Max: 37 (30 Apr 2019 17:04)  T(F): 98.3 (01 May 2019 09:45), Max: 98.6 (30 Apr 2019 17:04)  HR: 83 (01 May 2019 09:45) (83 - 108)  BP: 120/70 (01 May 2019 09:45) (120/70 - 172/76)  BP(mean): --  RR: 17 (01 May 2019 09:45) (17 - 18)  SpO2: 97% (01 May 2019 09:45) (97% - 99%)  CAPILLARY BLOOD GLUCOSE      POCT Blood Glucose.: 119 mg/dL (01 May 2019 07:27)  POCT Blood Glucose.: 100 mg/dL (30 Apr 2019 21:15)  POCT Blood Glucose.: 112 mg/dL (30 Apr 2019 16:46)  POCT Blood Glucose.: 172 mg/dL (30 Apr 2019 11:44)    I&O's Summary    30 Apr 2019 07:01  -  01 May 2019 07:00  --------------------------------------------------------  IN: 0 mL / OUT: 350 mL / NET: -350 mL    01 May 2019 07:01  -  01 May 2019 11:38  --------------------------------------------------------  IN: 0 mL / OUT: 300 mL / NET: -300 mL        PHYSICAL EXAM:  GENERAL: NAD, well-developed  HEAD:  Atraumatic, Normocephalic  EYES: EOMI, PERRLA, conjunctiva and sclera clear  NECK: Supple, No JVD  CHEST/LUNG: Clear to auscultation bilaterally; No wheeze  HEART: Regular rate and rhythm; No murmurs, rubs, or gallops  ABDOMEN: Soft, Nontender, Nondistended; Bowel sounds present  EXTREMITIES:  2+ Peripheral Pulses, No clubbing, cyanosis, or edema  PSYCH: AAOx3  NEUROLOGY: non-focal  SKIN: No rashes or lesions    LABS:                    RADIOLOGY & ADDITIONAL TESTS:    Imaging Personally Reviewed:    Consultant(s) Notes Reviewed:      Care Discussed with Consultants/Other Providers: Ortho    ***History Limited to due to:   [ ] Dementia   [ ] Delirium   [ ] Condition    ***Delirium screen:   Patient knows the day of the week: [x ] YES [ ] NO  Patient can state the days of the week or months of the year backwards: [x ] YES [ ] NO    ***Function:   [ x] Independent  [ ] Assistance  [ ] Total care  [ ] Non-ambulatory    ***Living Situation/home resources:  [ ] Lives alone  [ x] Lives with family  [ ] Has a home health aide    ***Advanced Directives:  [ ] DNR  [ ] No feeding tube  [ ] MOLST in chart  [ ] MOLST completed today   [ ] Unknown    ***Projected Discharge Plan:  [ ] Home  [x ] Rehab  [ ] SNF  [ ] Other living facility    ***Discussion:  [ x] Discharge plan discussed with patient and family

## 2019-05-01 NOTE — PROGRESS NOTE ADULT - ASSESSMENT
89 y.o. Female w/ hx HTN, DM2, hyperlipidemia, Afib on xarelto p/w  right intertrochanteric fracture s/p IMN on 4/27 now with uncontrolled DM.
89 y.o. Female w/ hx HTN, DM2, hyperlipidemia, Afib on xarelto p/w  right intertrochanteric fracture s/p IMN on 4/27 now with uncontrolled DM.
89 y.o. woman with multiple medical co-morbidities now with right intertrochanteric fracture
89F sp R IMN    ·	Neuro: Pain control  ·	Resp: IS  ·	GI: Regular diet, bowel reg  ·	MSK: WBAT, PT/OT  ·	Heme: DVT PPX  ·	dispo planning    Ortho [id number]
A/P 89y year old female s/p Nail fixation of R intertrochanteric fracture on 4/27.    Pain Control  DVT PPX  PT/OOB  WBAT   Appreciate endo recs for discharge  Dispo Planning - rehab today
89 y.o. Female w/ hx HTN, DM2, hyperlipidemia, Afib on xarelto p/w  right intertrochanteric fracture s/p IMN on 4/27; postop course c/b metabolic encephalopathy and uncontrolled DM now resolved.

## 2019-05-01 NOTE — PROVIDER CONTACT NOTE (OTHER) - ACTION/TREATMENT ORDERED:
MD Bartolo DO made aware, in room to see patient. as per MD Bartolo DO continue to assess and monitor. No medication given.
MD Bartolo bautista made aware and in room with patient. pt allowed us to give medications at this time.
Administer Metoprolol as ordered and reassess blood pressure 30 min after.
Austen made aware, will continue to monitor. No other action ordered at this time.
MD Mcfarland made aware of patient refusing everything.

## 2019-05-01 NOTE — PROGRESS NOTE ADULT - PROVIDER SPECIALTY LIST ADULT
Anesthesia
Cardiology
Hospitalist
Orthopedics
Hospitalist

## 2019-05-01 NOTE — DISCHARGE NOTE NURSING/CASE MANAGEMENT/SOCIAL WORK - NSDCDPATPORTLINK_GEN_ALL_CORE
You can access the [facility] Patient Portal, offered by [facility], by registering with the following website: [email or web address]

## 2019-05-01 NOTE — PROGRESS NOTE ADULT - ATTENDING COMMENTS
Agree with above.   Continue with ac for cva prevention  No further inpatient cardiac workup needed at this time.     Sloane Castillo MD
Patient discussed with resident.  Films reviewed.  Agree with assessment and plan.
Patient discussed with resident.  Films reviewed.  Agree with assessment and plan.
Patient seen and discussed with resident.  Films reviewed.  Agree with assessment and plan.
Patient seen and discussed with resident.  Films reviewed.  Agree with assessment and plan.

## 2019-05-01 NOTE — PROGRESS NOTE ADULT - PROBLEM SELECTOR PROBLEM 2
Closed fracture of right hip, initial encounter

## 2019-05-01 NOTE — PROGRESS NOTE ADULT - PROBLEM SELECTOR PLAN 2
s/p IMN 4/27  Post op care per ortho  Pain controlled with Oxy IR prn  d/c planning as per primary team

## 2019-05-01 NOTE — DISCHARGE NOTE NURSING/CASE MANAGEMENT/SOCIAL WORK - NSDCPNINST_GEN_ALL_CORE
Maintain hip incision and dressing clean dry and intact. Call MD with any signs of infection ie fever, redness, drainage, or with signs of bleeding, unrelieved increased pain, or persistent nausea. Continue to drink plenty of fluids. Avoid strenuous activity and constipation which may be a side effect from taking certain medications and narcotics.   safety and fall prevention measures reinforced. Maintain hip incision and dressing clean dry and intact. Call MD with any signs of infection ie fever, redness, drainage, or with signs of bleeding, unrelieved increased pain, or persistent nausea. Continue to drink plenty of fluids. Avoid strenuous activity and constipation which may be a side effect from taking certain medications and narcotics.   safety and fall prevention measures reinforced.  Make an appointment with endocrinologist, PMDOROTA for A1C 11.6, continue with consistent carbohydrate diet.

## 2019-05-01 NOTE — CHART NOTE - NSCHARTNOTEFT_GEN_A_CORE
called by team for final dc reccs as pt is to leave for rehab today.  Pt unable to be seen as soon to be discharged to rehab and plan discussed with team. (please see full consult note for details)      89y Female hx of afib on xarelto, DM on orals only, uncontrolled A1C at 11,  history of left hip fracture s/p IMN, presents s/p mech fall c/o severe R hip pain s/p R IMN of a R IT fracture on 4/27.  endocrine called for assistance with DM and hyperglycemia       #Diabetes   check FSBG TID qac and hs  carb consistent diet   - Lantus  22 u qhs  -Humalog 8-8-8   -low dose SS TIDAC  - RDE consult inpt     For DC: Long term DM plan TBD  ideally needs basal +oral (januvia)  I have had a long discussion with the pt about her uncontrolled A1C yesterday  while on 3 orals and that she needs better glycemic control to help with wound healing and health.  I discussed the need for insulin (just basal) and pt refuses with no clear reason except "i am 89 and dont want to change my routine". explained my concern for marked hyperglycemia at home based on A1C and that basa insulin ideally should be implemented given that 3 orals are not helping to control the DM.  Would not  resume Actos, (there is an increased risk of fracture and this risk increased with longer duration of thiazolidinedione use (4 years or more))  would encourage ongoing discussion about need for insulin inpt and at rehab.    Short term plan is rehab and therefore would dc on basal bolus and low dose premeal scale only (at above doses as she is optimally controlled on this regimen) to rehab however would recommend glucometer and insulin teaching inpt if pt is amenable and or at rehab.  please inform rehab that this discussion will need to be continued at rehab as pt was only seen by endocrine once on day of consult and pt to be dc to rehab today.  Would recommend that rehab contact pts PCP for need for close short term followup after dc from rehab and to discuss safe DM plan and tx at home based on insulin needs at rehab.    If pt is refusing insulin than will need dc on oral DM regimen, CDE consult, as well as DM education and DM supplies (pt does not use glucometer, therefore will need testing supplies)  would also recommend that pt have VNS services to help with DM and medication management given her age and falls risk and uncontrolled DM    #Fractures  as reccomended would check a Vit D level, not checked inpt, please check and followup at rehab  will need close followup outpt, high risk for fx   PT , falls assessment,   ideally pt would benefit from bisphonates tx and or prolia given hx of fracture and current fx and should be discussed outpt        If patient wishes to follow up with [facility]     [facility]  [address] [place] [address], [address], [place], [place] [id number] [phone]   Please call to schedule appointment with CDE/Nutritionist and MD appointment next available   --------------------------------------------------------------------------------------  [facility]  [address] [place] [address], [address] [place] [address] [phone]  -------------------------------------------------------------------------------------  [facility]  [facility]  [phone]   Call for appt and location       Diamond Anne MD  Pager [phone] ([facility])/ [phone] ([facility]) [please provide 10 digit call back number]  Nights and weekends: [phone]  Please note that this patient may be followed by a different provider tomorrow. If no answer or after hours, please contact [phone].  For final dc reccomendations, please call [phone] or page the endocrine fellow on call.

## 2019-05-01 NOTE — PROGRESS NOTE ADULT - SUBJECTIVE AND OBJECTIVE BOX
Orthopedic Surgery Progress Note    Patient seen and examined this morning. No acute events overnight. Pain is well controlled. HR elevated up to 100s overnight. Took morning meds this morning.     O:  Vital Signs Last 24 Hrs  T(C): 36.6 (01 May 2019 05:45), Max: 37 (30 Apr 2019 17:04)  T(F): 97.8 (01 May 2019 05:45), Max: 98.6 (30 Apr 2019 17:04)  HR: 90 (01 May 2019 05:45) (87 - 108)  BP: 131/81 (01 May 2019 05:45) (121/80 - 172/76)  BP(mean): --  RR: 18 (01 May 2019 05:45) (18 - 18)  SpO2: 99% (01 May 2019 05:45) (96% - 99%)    Gen: NAD  RLE  Dressing C/D/I  EHL/FHL/TA/GS intact  SILT DP/SP/YOUNG/Sa  WWP distally    Labs: Orthopedic Surgery Progress Note     Patient seen and examined this morning. No acute events overnight. Pain is well controlled. HR elevated up to 100s overnight. Took morning meds this morning.     O:  Vital Signs Last 24 Hrs  T(C): 36.6 (01 May 2019 05:45), Max: 37 (30 Apr 2019 17:04)  T(F): 97.8 (01 May 2019 05:45), Max: 98.6 (30 Apr 2019 17:04)  HR: 90 (01 May 2019 05:45) (87 - 108)  BP: 131/81 (01 May 2019 05:45) (121/80 - 172/76)  BP(mean): --  RR: 18 (01 May 2019 05:45) (18 - 18)  SpO2: 99% (01 May 2019 05:45) (96% - 99%)    Gen: NAD  RLE  Dressing C/D/I  EHL/FHL/TA/GS intact  SILT DP/SP/YOUNG/Sa  WWP distally    Labs:

## 2019-05-01 NOTE — PROVIDER CONTACT NOTE (OTHER) - RECOMMENDATIONS
Notify MD
Notify MD
Fluids encouraged. Give additional insulin if needed per MD order.
Give ordered Metoprolol.
notify MD

## 2019-06-08 NOTE — ED ADULT NURSE NOTE - OBJECTIVE STATEMENT
Clare RN: 89yof received to [facility] for unresponsiveness. per EMS pt aide attempted to go get pt and she did not answer door. when they were finally able to get into apartment, they found pt down, w/ brown emesis around her. pt received unresponsive, pinpoint non reactive pupils. pt moving all four extremeties but noted to be favoring towards L side. pt maintaining airway well, sating 100% on RA. pt arrived in rapid a-fib to 160s, hypertensive 190s/110s. pt brought to CT. pt  on arrival. pt arrives w/ 22g iv lock to L ac. additional 20g iv lock placed to R wrist.     per family, pt was of normal mental status last PM. pt had recent hip injury and was Rx'ed oxycodone. daughter at bedside reports concern that pt has not been taking her medications (xarelto, metformin)

## 2019-06-08 NOTE — CONSULT NOTE ADULT - ASSESSMENT
89 y/o  female with past medical history of HTN, DM2, HLD, A-fib on Xarelto presents to the ED BIBEMS after being found down in her home. Patient reportedly had brown vomitus near her and had urinated on herself and was not responsive in the field but protecting her airway. As per patient's daughter, last time she was spoken to and at her baseline was 6/7 at 10pm.   At baseline, patient lives on her own.   Neurologic exam demonstrates obtunded mental status, Does not open eyes to voice or noxious stimuli. No verbal output. Not following commands. L gaze deviation, R facial paralysis. RUE/RLE paresis (no effort against gravity). NIHSS 88 y/o  female with past medical history of HTN, DM2, HLD, A-fib on Xarelto presents to the ED BIBEMS after being found down in her home. Patient reportedly had brown vomitus near her and had urinated on herself and was not responsive in the field but protecting her airway. As per patient's daughter, last time she was spoken to and at her baseline was 6/7 at 10pm.   At baseline, patient lives on her own.   Neurologic exam demonstrates obtunded mental status, Does not open eyes to voice or noxious stimuli. No verbal output. Not following commands. L gaze deviation, R facial paralysis. RUE/RLE paresis (no effort against gravity). NIHSS 29. Pre-MRS 2. tPA not given as outside of the window. Not an Endovascular candidate due to >24 hours from LKN.     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    Recommendations:   [] MICU Evaluation  [] Permissive HTN up to 220/120 mmHg for first 48-72 hours after the symptom onset followed by gradual normotension  [] Lipitor 80 mg PO QHS  [] HbA1c, LDL and continue with aggressive vascular risk factors modifications   [] NPO until patient passes dysphagia screen  [] Tele monitor  [] PT/OT/S&S eval  [] Would hold A/C given size of L MCA infarction evident on CT  [] Repeat CT Head in 24 hours or MRI Brain w/o contrast  [] Would discuss GOC regarding aggressiveness of work-up  [] Consider Palliative Care Consult 89 y/o  female with past medical history of HTN, DM2, HLD, A-fib on Xarelto presents to the ED BIBEMS after being found down in her home. Patient reportedly had brown vomitus near her and had urinated on herself and was not responsive in the field but protecting her airway. As per patient's daughter, last time she was spoken to and at her baseline was 6/7 at 10pm.   At baseline, patient lives on her own.   Neurologic exam demonstrates obtunded mental status, Does not open eyes to voice or noxious stimuli. No verbal output. Not following commands. L gaze deviation, R facial paralysis. RUE/RLE paresis (no effort against gravity). NIHSS 29. Pre-MRS 2. CT Head demonstrating almost entire L MCA/PCA infarction. tPA not given as outside of the window. Not an Endovascular candidate due to >24 hours from LKN.     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

## 2019-06-08 NOTE — ED PROVIDER NOTE - OBJECTIVE STATEMENT
89F h/o afib on xarelto, DM BIBEMS after being found down in apartment. Pt had brown vomitus around mouth, urinary incontinence, was not responding to EMS on arrival, found to be hyperglycemic to 400s in field. 89F h/o afib on xarelto, DM BIBEMS after being found down in apartment. Pt had brown vomitus around mouth, urinary incontinence, was not responding to EMS on arrival, found to be hyperglycemic to 400s in field. Not responding to questioning in ED.

## 2019-06-08 NOTE — ED PROVIDER NOTE - CARE PLAN
Principal Discharge DX:	Altered mental status  Secondary Diagnosis:	Upper gastrointestinal bleed  Secondary Diagnosis:	Atrial fibrillation  Secondary Diagnosis:	Tachycardia  Secondary Diagnosis:	HTN (hypertension)

## 2019-06-08 NOTE — ED ADULT NURSE NOTE - NSIMPLEMENTINTERV_GEN_ALL_ED
Implemented All Universal Safety Interventions:  [place] to call system. Call bell, personal items and telephone within reach. Instruct patient to call for assistance. Room bathroom lighting operational. Non-slip footwear when patient is off stretcher. Physically safe environment: no spills, clutter or unnecessary equipment. Stretcher in lowest position, wheels locked, appropriate side rails in place.

## 2019-06-08 NOTE — CONSULT NOTE ADULT - SUBJECTIVE AND OBJECTIVE BOX
Neurology Consult Note    HPI: 88 y/o  female with past medical history of HTN, DM2, HLD, A-fib on Xarelto presents to the ED BIBEMS after being found down in her home. Patient reportedly had brown vomitus near her and had urinated on herself and was not responsive in the field but protecting her airway. As per patient's daughter, last time she was spoken to and at her baseline was 6/7 at 10pm.   At baseline, patient lives on her own.       MEDICATIONS  (STANDING):  diltiazem Infusion 5 mG/Hr (5 mL/Hr) IV Continuous <Continuous>  insulin regular Infusion 3 Unit(s)/Hr (3 mL/Hr) IV Continuous <Continuous>  LORazepam   Injectable 1 milliGRAM(s) IV Push Once    MEDICATIONS  (PRN):    PAST MEDICAL & SURGICAL HISTORY:  Atrial fibrillation, unspecified type  HLD (hyperlipidemia)  DM2 (diabetes mellitus, type 2)  HTN (hypertension)  Arthritis involving multiple sites  S/P hip replacement    FAMILY HISTORY:  No pertinent family history in first degree relatives    Allergies    No Known Allergies    Intolerances        SHx - No smoking, No ETOH, No drug abuse      Review of Systems:  Unable to obtain.    Vital Signs Last 24 Hrs  T(C): 36.8 (08 Jun 2019 20:45), Max: 36.8 (08 Jun 2019 20:45)  T(F): 98.3 (08 Jun 2019 20:45), Max: 98.3 (08 Jun 2019 20:45)  HR: 117 (08 Jun 2019 22:45) (117 - 163)  BP: 163/82 (08 Jun 2019 22:45) (163/82 - 193/110)  BP(mean): --  RR: 20 (08 Jun 2019 22:45) (19 - 20)  SpO2: 100% (08 Jun 2019 22:45) (99% - 100%)    General Exam:   General appearance: Obtunded      Neurological Exam:  Mental Status: Eyes closed. Does not open eyes to voice or noxious stimuli. No verbal output. Not following commands.    Cranial Nerves:   PERRL, +L gaze deviation. no nystagmus. +R facial paralysis.      Motor:   Tone: Decreased in RUE/RLE           Strength:     [] Upper extremity                                                                     R         No effort against gravity                                               L         Some effort against gravity  [] Lower extremity                                                                     R           No effort against gravity                                               L         Some effort against gravity  Pronator drift: none                 Dysmetria: Unable to assess  Tremor: No resting, postural or action tremor.  No myoclonus.    Sensation: Withdraws LUE/LLE to noxious stimuli. +Minimal withdrawal in RLE to noxious stimuli    Gait: Unable to assess    Other:    06-08    138  |  93<L>  |  17  ----------------------------<  410<H>  7.5<HH>   |  21<L>  |  0.90    Ca    10.3      08 Jun 2019 20:15    TPro  8.8<H>  /  Alb  3.9  /  TBili  1.3<H>  /  DBili  x   /  AST  72<H>  /  ALT  28  /  AlkPhos  436<H>  06-08                            17.4   11.16 )-----------( 419      ( 08 Jun 2019 20:15 )             55.6       Radiology    CT:   MRI  EKG:  tele:  TTE:  EEG: Neurology Consult Note    HPI: 89 y/o  female with past medical history of HTN, DM2, HLD, A-fib on Xarelto presents to the ED BIBEMS after being found down in her home. Patient reportedly had brown vomitus near her and had urinated on herself and was not responsive in the field but protecting her airway. As per patient's daughter, last time she was spoken to and at her baseline was 6/7 at 10pm.   At baseline, patient lives on her own.       MEDICATIONS  (STANDING):  diltiazem Infusion 5 mG/Hr (5 mL/Hr) IV Continuous <Continuous>  insulin regular Infusion 3 Unit(s)/Hr (3 mL/Hr) IV Continuous <Continuous>  LORazepam   Injectable 1 milliGRAM(s) IV Push Once    MEDICATIONS  (PRN):    PAST MEDICAL & SURGICAL HISTORY:  Atrial fibrillation, unspecified type  HLD (hyperlipidemia)  DM2 (diabetes mellitus, type 2)  HTN (hypertension)  Arthritis involving multiple sites  S/P hip replacement    FAMILY HISTORY:  No pertinent family history in first degree relatives    Allergies    No Known Allergies    Intolerances        SHx - No smoking, No ETOH, No drug abuse      Review of Systems:  Unable to obtain.    Vital Signs Last 24 Hrs  T(C): 36.8 (08 Jun 2019 20:45), Max: 36.8 (08 Jun 2019 20:45)  T(F): 98.3 (08 Jun 2019 20:45), Max: 98.3 (08 Jun 2019 20:45)  HR: 117 (08 Jun 2019 22:45) (117 - 163)  BP: 163/82 (08 Jun 2019 22:45) (163/82 - 193/110)  BP(mean): --  RR: 20 (08 Jun 2019 22:45) (19 - 20)  SpO2: 100% (08 Jun 2019 22:45) (99% - 100%)    General Exam:   General appearance: Obtunded      Neurological Exam:  Mental Status: Eyes closed. Does not open eyes to voice or noxious stimuli. No verbal output. Not following commands.    Cranial Nerves:   PERRL, +L gaze deviation. no nystagmus. +R facial paralysis.      Motor:   Tone: Decreased in RUE/RLE           Strength:     [] Upper extremity                                                                     R         No effort against gravity                                               L         Some effort against gravity  [] Lower extremity                                                                     R           No effort against gravity                                               L         Some effort against gravity  Pronator drift: none                 Dysmetria: Unable to assess  Tremor: No resting, postural or action tremor.  No myoclonus.    Sensation: Withdraws LUE/LLE to noxious stimuli. +Minimal withdrawal in RLE to noxious stimuli    Gait: Unable to assess    Other:    06-08    138  |  93<L>  |  17  ----------------------------<  410<H>  7.5<HH>   |  21<L>  |  0.90    Ca    10.3      08 Jun 2019 20:15    TPro  8.8<H>  /  Alb  3.9  /  TBili  1.3<H>  /  DBili  x   /  AST  72<H>  /  ALT  28  /  AlkPhos  436<H>  06-08                            17.4   11.16 )-----------( 419      ( 08 Jun 2019 20:15 )             55.6       Radiology    CT: < from: CT Head No Cont (06.08.19 @ 20:46) >  IMPRESSION:    Acute infarcts involving nearly the entirety of the left MCA and PCA   territories territory infarcts. Findings may be seen in the setting of a   proximal left internal carotid artery occlusion with associated fetal   origin of the left posterior cerebral artery.    < end of copied text >    MRI  EKG:  tele:  TTE:  EEG:

## 2019-06-08 NOTE — ED PROVIDER NOTE - CLINICAL SUMMARY MEDICAL DECISION MAKING FREE TEXT BOX
89F w/ above history BIBEMS as notification after being found down, hyperglycemic, brown vomitus around mouth, eyes deviated to L on exam, concerning for ICH vs metabolic encephalopahy 2/2 DKA or underlying infxn  -labs, ekg, ct, xr, rate control, admit

## 2019-06-08 NOTE — ED PROVIDER NOTE - PROGRESS NOTE DETAILS
Initially treated w/ Kcentra for concern over hematemesis, also got protonix. labs initially showed anion gap w/ + ketones, started on insulin drip w/ improvement in gap. treated w/ diltiazem for rapid a-fib w/ improvement in tachycardia to 110s. Seen by micu who believed pt not candidate at this time, d/w hospitalist, will admit

## 2019-06-08 NOTE — ED PROVIDER NOTE - ATTENDING CONTRIBUTION TO CARE
I performed a face-to-face evaluation of the patient and performed a history and physical examination. I agree with the history and physical examination.    Older F, Xarelto, p/w substantial, zhane BRBPR. Non-painful. Likely diverticular bleed. HR elevated. CT angio. Give PCC. Transfuse PRBCs. Consult GI, IR, Surgery. Admit.

## 2019-06-08 NOTE — ED ADULT TRIAGE NOTE - CHIEF COMPLAINT QUOTE
BIBEMS as notification for AMS and possible DKA, pt was hypertensive and tachycardic at scene, on arrival pt on NRB, taken directly to [facility]

## 2019-06-09 NOTE — PROGRESS NOTE ADULT - PROBLEM SELECTOR PLAN 7
- permissive hypertension given acute stroke  - continue cardizem drip for now with goal 220/120 - after extensive discussion with daughter, she voices that her mother would not have wanted aggressive life support as she was previously very independent   - confirmed DNR/DNI/no feeding tube at this time, will fill out MOLST with verbal signature and place order in computer   - palliative for assistance in managing symptoms at this time  - continue supportive, comfort directed measures including oxygen treatments and pain medications

## 2019-06-09 NOTE — H&P ADULT - NSHPLABSRESULTS_GEN_ALL_CORE
LABORATORY                          17.4   11.16 )-----------( 419      ( 08 Jun 2019 20:15 )             55.6    06-08    143  |  102  |  17  ----------------------------<  384<H>  3.7   |  18<L>  |  0.95    Ca    9.0      08 Jun 2019 21:35    TPro  8.8<H>  /  Alb  3.9  /  TBili  1.3<H>  /  DBili  x   /  AST  72<H>  /  ALT  28  /  AlkPhos  436<H>  06-08      Troponin T, High Sensitivity (06.08.19 @ 20:15)    Troponin T, High Sensitivity: 94: Hemolysis in this sample could falsely lower troponin T  results. Interpret with caution    Troponin T, High Sensitivity (06.08.19 @ 21:35)    Troponin T, High Sensitivity: 97: Delta: 94 on 06/08/      Blood Gas Venous Comprehensive (06.08.19 @ 20:15)    Blood Gas Venous - Lactate: 6.0: Please note updated reference range. mmol/L    Blood Gas Venous Comprehensive (06.09.19 @ 02:00)    Blood Gas Venous - Lactate: 3.2: Please note updated reference range. mmol/L      EKG: atrial fibrillation at 168 bpm, unable to appreciate acute ST changes     RADIOLOGY    < from: CT Head No Cont (06.08.19 @ 20:46) >      IMPRESSION:    Acute infarcts involving nearly the entirety of the left MCA and PCA   territories territory infarcts. Findings may be seen in the setting of a   proximal left internal carotid artery occlusion with associated fetal   origin of the left posterior cerebral artery.    Findings discussed by Dr. Valentine to Dr. Wagner on 6/8/2019 at 8:55 PM.      APPLE VALENTINE M.D., RADIOLOGY RESIDENT  This document has been electronically signed.  KIRIT PEMBERTON M.D., ATTENDING RADIOLOGIST  This document has been electronically signed. Jun 8 2019  9:12PM

## 2019-06-09 NOTE — H&P ADULT - PROBLEM SELECTOR PLAN 8
- after extensive discussion with daughter, she voices that her mother would not have wanted aggressive life support as she was previously very independent   - confirmed DNR/DNI/no feeding tube at this time, will fill out MOLST with verbal signature and place order in computer   - palliative for assistance in managing symptoms at this time  - continue supportive, comfort directed measures including oxygen treatments and pain medications

## 2019-06-09 NOTE — CONSULT NOTE ADULT - ASSESSMENT
Patient is an 89 yo woman with uncontrolled T2DM A1c of 11.6%, HTN, HLD, Afib, osteoporosis with recent hip fracture admitted for large MCA/PCA infarct.  (high medical decision making)

## 2019-06-09 NOTE — H&P ADULT - NSHPSOCIALHISTORY_GEN_ALL_CORE
Patient was previously independent in all ADLs. She lived by herself. No toxic habits. Since May, using a rolling walker.

## 2019-06-09 NOTE — PROGRESS NOTE ADULT - PROBLEM SELECTOR PLAN 4
- patient found in blood tinged vomitus   - possibly from tongue biting after seizure event in the setting of acute ICH  - no further episodes and blood counts stable - patient with afib with RVR on metoprolol and Xarelto at home  - likely non compliant with medications and may have triggered this  - holding all AC at this time 2/2 large size of CVA and risk of hemorrhagic conversion/rebleeding  -tele

## 2019-06-09 NOTE — CHART NOTE - NSCHARTNOTEFT_GEN_A_CORE
Patient evaluated at bedside with Neurology attending, exam today is similar from prior evaluation, CTh noted, has infarcts in L MCA and PCA territory  Please obtain CT head 12 hours after initial CT (was done at 8 pm on 6/8/19) and repeat 24 hours after initial head CT.   Family spoken with at bedside, difficult to prognosticate at this point, repeat imaging would be useful in order to determine further GOC (patient is currently DNR), etc.    Jeanne Vela  PG2 Neurology resident

## 2019-06-09 NOTE — H&P ADULT - PROBLEM SELECTOR PLAN 2
- acute encephalopathy likely secondary to acute CVA as outlined above versus less likely infection versus metabolic   - continue plan of care as outlined above

## 2019-06-09 NOTE — H&P ADULT - PROBLEM SELECTOR PLAN 1
- patient with acute L sided PCA/MCA infarcts with resultant R sided paralysis, fixed gaze, and encephalopathy   - appreciated neuro recs, admit to tele, repeat CT head in 24 hours (8:30pm on 6/9)   - permissive hypertension to 220/120  - NPO pending dysphagia screen if able to participate given mental status   - patient protecting airway at this time but monitor respiratory status carefully   - holding all AC at this time  - poor prognosis, GOC to be addressed again with day team, palliative consult - patient with acute L sided PCA/MCA infarcts with resultant R sided paralysis, fixed gaze, and encephalopathy   - appreciated neuro recs, admit to tele, repeat CT head in 24 hours (8:30pm on 6/9)   - permissive hypertension to 220/120  - NPO pending dysphagia screen if able to participate given mental status   - patient now with labored breathing and tachypnea with concern for aspiration  - per discussion with patient's daughter, patient would not have wanted aggressive interventions --> discussed comfort with patient's daughter who agrees   -

## 2019-06-09 NOTE — H&P ADULT - PROBLEM SELECTOR PLAN 6
- A1c 11 in April 2019, refused to take insulin  - NPO so fingersticks q6 for now with sliding scale  - will have day team discuss need for further finger sticks and insulin sliding scale given focus on comfort

## 2019-06-09 NOTE — CONSULT NOTE ADULT - ATTENDING COMMENTS
89 year old female with extensive L MCA/PCA ischemia   Initial HCT reviewed  daughter at bedside d/c goals of care briefly given current HCT and exam findings   recommend q2-4h neuro checks   stat hct if change in exam   repeat HCT this AM and again in 24hrs from initial scan   Consider EEG if concern for seizure   family does not want neurosurgical consult   Pt DNR/DNI     Plan d/c with resident, see note.    Attempts were made to d/c plan with primary team.  Plan briefly d/c with nurse. 89 year old female with extensive L MCA/PCA ischemia   Initial HCT reviewed  daughter at bedside d/c goals of care briefly given current HCT and exam findings   recommend q2-4h neuro checks   stat hct if change in exam   repeat HCT this AM and again in 24hrs from initial scan   Consider EEG   family does not want neurosurgical consult   Pt DNR/DNI     Plan d/c with resident, see note.    Attempts were made to d/c plan with primary team.  Plan briefly d/c with nurse.

## 2019-06-09 NOTE — H&P ADULT - NSICDXPASTMEDICALHX_GEN_ALL_CORE_FT
PAST MEDICAL HISTORY:  Arthritis involving multiple sites     Atrial fibrillation, unspecified type     DM2 (diabetes mellitus, type 2)     HLD (hyperlipidemia)     HTN (hypertension)

## 2019-06-09 NOTE — PROGRESS NOTE ADULT - SUBJECTIVE AND OBJECTIVE BOX
Dr. Eduard Art   Internal Medicine PGY-1  Pager [phone] ([facility])/[phone] ([facility])    Patient is a 89y old  Female who presents with a chief complaint of Stroke (2019 13:46)      SUBJECTIVE / OVERNIGHT EVENTS:  Spoke with daughter, unsure if patient was found down in her own vomit or urine. Patient made DNR/DNI. Pending hospice care eval, but not yet comfort measures. AAOx3, no response to verbal stimuli.     MEDICATIONS  (STANDING):  ALBUTerol/ipratropium for Nebulization 3 milliLiter(s) Nebulizer every 6 hours  dextrose 5%. 1000 milliLiter(s) (50 mL/Hr) IV Continuous <Continuous>  dextrose 50% Injectable 12.5 Gram(s) IV Push once  dextrose 50% Injectable 25 Gram(s) IV Push once  dextrose 50% Injectable 25 Gram(s) IV Push once  diltiazem Infusion 20 mG/Hr (20 mL/Hr) IV Continuous <Continuous>  insulin glargine Injectable (LANTUS) 8 Unit(s) SubCutaneous at bedtime  insulin lispro (HumaLOG) corrective regimen sliding scale   SubCutaneous every 6 hours  insulin lispro Injectable (HumaLOG) 2 Unit(s) SubCutaneous every 6 hours    MEDICATIONS  (PRN):  dextrose 40% Gel 15 Gram(s) Oral once PRN Blood Glucose LESS THAN 70 milliGRAM(s)/deciliter  glucagon  Injectable 1 milliGRAM(s) IntraMuscular once PRN Glucose LESS THAN 70 milligrams/deciliter  morphine  - Injectable 1 milliGRAM(s) IV Push every 3 hours PRN dyspnea      Vital Signs Last 24 Hrs  T(C): 36.8 (2019 14:06), Max: 37.6 (2019 06:17)  T(F): 98.2 (2019 14:06), Max: 99.6 (2019 06:17)  HR: 112 (2019 14:06) (110 - 163)  BP: 146/76 (2019 14:06) (146/76 - 193/110)  BP(mean): --  RR: 26 (2019 14:06) (15 - 30)  SpO2: 100% (2019 14:06) (95% - 100%)  CAPILLARY BLOOD GLUCOSE      POCT Blood Glucose.: 258 mg/dL (2019 12:56)  POCT Blood Glucose.: 304 mg/dL (2019 06:15)  POCT Blood Glucose.: 228 mg/dL (2019 00:33)  POCT Blood Glucose.: 250 mg/dL (2019 23:23)  POCT Blood Glucose.: 317 mg/dL (2019 22:21)  POCT Blood Glucose.: 324 mg/dL (2019 21:15)  POCT Blood Glucose.: 422 mg/dL (2019 20:23)    I&O's Summary    2019 07:01  -  2019 07:00  --------------------------------------------------------  IN: 60 mL / OUT: 275 mL / NET: -215 mL        PHYSICAL EXAM:  GENERAL: Unresponsive  HEAD:  Atraumatic, Normocephalic  EYES: Conjunctiva and sclera clear, pupils reactive bilaterally   NECK: Supple, No JVD  CHEST/LUNG: Upper airway snoring, otherwise, CTA bilaterally; No wheeze  HEART: Regular rate and rhythm; No murmurs, rubs, or gallops  ABDOMEN: Soft, Nontender, Nondistended; Bowel sounds present  EXTREMITIES:  2+ Peripheral Pulses, No clubbing, cyanosis, or edema  PSYCH: AAOx0  NEUROLOGY: Only moving her left side. No movement on the right side. Cannot follow commands.   SKIN: No rashes or lesions    LABS:                        15.0   17.54 )-----------( 378      ( 2019 06:06 )             48.2     -    142  |  102  |  15  ----------------------------<  360<H>  3.6   |  22  |  0.81    Ca    9.6      2019 06:06  Phos  2.4       Mg     1.4     09    TPro  8.8<H>  /  Alb  3.9  /  TBili  1.3<H>  /  DBili  x   /  AST  72<H>  /  ALT  28  /  AlkPhos  436<H>  0608    PT/INR - ( 2019 06:06 )   PT: 13.5 SEC;   INR: 1.21          PTT - ( 2019 21:56 )  PTT:28.7 SEC      Urinalysis Basic - ( 2019 22:50 )    Color: YELLOW / Appearance: CLEAR / S.020 / pH: 6.5  Gluc: >1000 / Ketone: MODERATE  / Bili: NEGATIVE / Urobili: NORMAL   Blood: MODERATE / Protein: >600 / Nitrite: NEGATIVE   Leuk Esterase: NEGATIVE / RBC: 11-25 / WBC 0-2   Sq Epi: FEW / Non Sq Epi: x / Bacteria: NEGATIVE        RADIOLOGY & ADDITIONAL TESTS:    Imaging Personally Reviewed:    Consultant(s) Notes Reviewed:      Care Discussed with Consultants/Other Providers: Dr. Eduard Art   Internal Medicine PGY-1  Pager [phone] ([facility])/[phone] ([facility])    Patient is a 89y old  Female who presents with a chief complaint of Stroke (2019 13:46)      SUBJECTIVE / OVERNIGHT EVENTS:  Spoke with daughter, unsure if patient was found down in her own vomit or urine. Patient made DNR/DNI. Pending hospice care eval, but not yet comfort measures. AAOx3, no response to verbal stimuli.     MEDICATIONS  (STANDING):  ALBUTerol/ipratropium for Nebulization 3 milliLiter(s) Nebulizer every 6 hours  dextrose 5%. 1000 milliLiter(s) (50 mL/Hr) IV Continuous <Continuous>  dextrose 50% Injectable 12.5 Gram(s) IV Push once  dextrose 50% Injectable 25 Gram(s) IV Push once  dextrose 50% Injectable 25 Gram(s) IV Push once  diltiazem Infusion 20 mG/Hr (20 mL/Hr) IV Continuous <Continuous>  insulin glargine Injectable (LANTUS) 8 Unit(s) SubCutaneous at bedtime  insulin lispro (HumaLOG) corrective regimen sliding scale   SubCutaneous every 6 hours  insulin lispro Injectable (HumaLOG) 2 Unit(s) SubCutaneous every 6 hours    MEDICATIONS  (PRN):  dextrose 40% Gel 15 Gram(s) Oral once PRN Blood Glucose LESS THAN 70 milliGRAM(s)/deciliter  glucagon  Injectable 1 milliGRAM(s) IntraMuscular once PRN Glucose LESS THAN 70 milligrams/deciliter  morphine  - Injectable 1 milliGRAM(s) IV Push every 3 hours PRN dyspnea      Vital Signs Last 24 Hrs  T(C): 36.8 (2019 14:06), Max: 37.6 (2019 06:17)  T(F): 98.2 (2019 14:06), Max: 99.6 (2019 06:17)  HR: 112 (2019 14:06) (110 - 163)  BP: 146/76 (2019 14:06) (146/76 - 193/110)  BP(mean): --  RR: 26 (2019 14:06) (15 - 30)  SpO2: 100% (2019 14:06) (95% - 100%)  CAPILLARY BLOOD GLUCOSE      POCT Blood Glucose.: 258 mg/dL (2019 12:56)  POCT Blood Glucose.: 304 mg/dL (2019 06:15)  POCT Blood Glucose.: 228 mg/dL (2019 00:33)  POCT Blood Glucose.: 250 mg/dL (2019 23:23)  POCT Blood Glucose.: 317 mg/dL (2019 22:21)  POCT Blood Glucose.: 324 mg/dL (2019 21:15)  POCT Blood Glucose.: 422 mg/dL (2019 20:23)    I&O's Summary    2019 07:01  -  2019 07:00  --------------------------------------------------------  IN: 60 mL / OUT: 275 mL / NET: -215 mL        PHYSICAL EXAM:  GENERAL: Unresponsive  HEAD:  Atraumatic, Normocephalic  EYES: Conjunctiva and sclera clear, pupils equal and reactive bilaterally   NECK: Supple, No JVD  CHEST/LUNG: Upper airway snoring, otherwise, CTA bilaterally; No wheeze  HEART: Regular rate and rhythm; No murmurs, rubs, or gallops  ABDOMEN: Soft, Nontender, Nondistended; Bowel sounds present  EXTREMITIES:  2+ Peripheral Pulses, No clubbing, cyanosis, or edema  PSYCH: lethargic, not responsive  NEUROLOGY: Only moving her left side spontaneously but not purposeful. No movement on the right side. Cannot follow commands.   SKIN: No rashes or lesions    LABS:                        15.0   17.54 )-----------( 378      ( 2019 06:06 )             48.2     06-09    142  |  102  |  15  ----------------------------<  360<H>  3.6   |  22  |  0.81    Ca    9.6      2019 06:06  Phos  2.4     06-09  Mg     1.4     -09    TPro  8.8<H>  /  Alb  3.9  /  TBili  1.3<H>  /  DBili  x   /  AST  72<H>  /  ALT  28  /  AlkPhos  436<H>  06-08    PT/INR - ( 2019 06:06 )   PT: 13.5 SEC;   INR: 1.21          PTT - ( 2019 21:56 )  PTT:28.7 SEC      Urinalysis Basic - ( 2019 22:50 )    Color: YELLOW / Appearance: CLEAR / S.020 / pH: 6.5  Gluc: >1000 / Ketone: MODERATE  / Bili: NEGATIVE / Urobili: NORMAL   Blood: MODERATE / Protein: >600 / Nitrite: NEGATIVE   Leuk Esterase: NEGATIVE / RBC: 11-25 / WBC 0-2   Sq Epi: FEW / Non Sq Epi: x / Bacteria: NEGATIVE        RADIOLOGY & ADDITIONAL TESTS:    Imaging Personally Reviewed:    Consultant(s) Notes Reviewed:      Care Discussed with Consultants/Other Providers:

## 2019-06-09 NOTE — CHART NOTE - NSCHARTNOTEFT_GEN_A_CORE
MICU consulted for DKA.   Patient was seen and examined at the time of consult.   At this time, pH normal on VBG which is less suggestive of DKA. Ketones in urine may be explained by dietary ketosis given reported poor PO intake. Hyperglycemia nearly resolved on low dose insulin drip. Patient is protecting airway and blood pressure appropriate in setting of large ischemic strokes. Patient does not warrant MICU level of care at this time.     Recommendations:  - additional IV hydration with 1L LR  - stop insulin drip as serum glucose near normal; give low dose Lantus e.g. 5-10 units   - consider amiodarone 150 IVP for Afib RVR  - admit to stroke/telemetry floor   - consider vEEG to rule out seizure  - goals of care/Palliative consult in AM    Reconsult as appropriate. Full consult note to follow.     Cecelia Mcgrath MD  PGY-3|MICU Resident  Pager [phone] MICU consulted for DKA.   Patient was seen and examined at the time of consult.   At this time, pH normal on VBG which is less suggestive of DKA. Ketones in urine may be explained by dietary ketosis given reported poor PO intake. Hyperglycemia nearly resolved on low dose insulin drip. Patient is protecting airway and blood pressure appropriate in setting of large ischemic strokes. Patient does not warrant MICU level of care at this time.     Recommendations:  - additional IV hydration with 1L LR  - stop insulin drip and give low dose Lantus e.g. 5-10 units  - consider amiodarone 150 IVP for Afib RVR  - admit to stroke/telemetry floor   - full stroke evaluation per Neurology  - consider vEEG to rule out seizure  - goals of care/Palliative consult in AM    Reconsult as appropriate. Full consult note to follow.     Cecelia Mcgrath MD  PGY-3|MICU Resident  Pager [phone] MICU consulted for DKA.   Patient was seen and examined at the time of consult.   At this time, pH normal on VBG which is less suggestive of DKA. Ketones in urine may be explained by dietary ketosis given reported poor PO intake. Hyperglycemia nearly resolved on low dose insulin drip. Patient is protecting airway and blood pressure appropriate in setting of large ischemic strokes. Patient does not warrant MICU level of care at this time.     Recommendations:  - additional IV hydration with 1L LR  - stop insulin drip and give low dose Lantus e.g. 5-10 units  - consider amiodarone 150 IVP for Afib RVR  - admit to stroke/telemetry floor   - full stroke evaluation per Neurology  - consider vEEG to rule out seizure  - goals of care/Palliative consult in AM    Reconsult as appropriate. Full consult note to follow.     Cecelia Mcgrath PGY-3  MICU Resident  Pager [phone] MICU consulted for DKA.   Patient was seen and examined at the time of consult.   At this time, pH normal on VBG which is less suggestive of DKA. Ketones in urine may be explained by dietary ketosis given reported poor PO intake. Hyperglycemia nearly resolved on low dose insulin drip. Patient is protecting airway and blood pressure appropriate in setting of large ischemic strokes. Patient does not warrant MICU level of care at this time.     Recommendations:  - additional IV hydration with 1L LR  - stop insulin drip and give low dose Lantus 5-10 units  - consider amiodarone 150 IVP for Afib RVR  - admit to stroke/telemetry floor   - full stroke evaluation per Neurology  - consider vEEG to rule out seizure  - goals of care/Palliative consult in AM    Reconsult as appropriate. Full consult note to follow.     Cecelia Mcgrath PGY-3  MICU Resident  Pager [phone]

## 2019-06-09 NOTE — CONSULT NOTE ADULT - SUBJECTIVE AND OBJECTIVE BOX
HPI:  89 y/o F with Afib on Xarelto, HTN, HLD, uncontrolled T2DM (A1c 11.6 04/2019) and recent admission for right hip fracture s/p nail fixation BIBEMS for unresponsiveness at home. Per daughter at bedside, the patient was found down in her apartment, incontinent of urine with blood vs. blood-tinged vomitus around mouth by EMS sent by Physical Therapist after patient did not respond to multiple calls today. The daughter spoke with the patient last yesterday evening. Patient complained only of uncontrolled right hip pain. Patient resides alone and previously capable of ADLs independently. Now dependent on walker on wheels for ambulation and refused home services/home health aide after discharge from [facility] about 2 weeks ago. With the exception of opioids, the daughter believes that her mother has not been taking her medications as prescribed or eating adequately since returning home. On chart review, patient was discharge on Lantus 22U qhs and Humalog 8 U TID with meals but the patient's daughter says that the patient did not want to take insulin.     The patient was a code stroke in the ED after CT head showed an acute L sided MCA/PCA stroke. She was also found to be in afib with RVR with FS in the 400s. She was initially started on a cardizem drip and insulin drip and MICU was consulted. She was not deemed a MICU candidate given her VBG did not show acidosis suggesting she was not in DKA and did not require an insulin drip. The patient's daughter at the bedside states that her father just passed away from a stroke 3 months ago. She had initiated a GOC with her mother but she did not want to participate. She feels her mother would not want aggressive interventions since she was previously independent but is not ready to make a decision at this time. (09 Jun 2019 05:46)    Pt seen and examined with daughter at bedside.       Review of Systems:  unable to obtain 	    PAST MEDICAL & SURGICAL HISTORY:  Atrial fibrillation, unspecified type  HLD (hyperlipidemia)  DM2 (diabetes mellitus, type 2)  HTN (hypertension)  Arthritis involving multiple sites  S/P hip replacement    MEDICATIONS  (STANDING):  ALBUTerol/ipratropium for Nebulization 3 milliLiter(s) Nebulizer every 6 hours  dextrose 5%. 1000 milliLiter(s) (50 mL/Hr) IV Continuous <Continuous>  dextrose 50% Injectable 12.5 Gram(s) IV Push once  dextrose 50% Injectable 25 Gram(s) IV Push once  dextrose 50% Injectable 25 Gram(s) IV Push once  diltiazem Infusion 20 mG/Hr (20 mL/Hr) IV Continuous <Continuous>  insulin lispro (HumaLOG) corrective regimen sliding scale   SubCutaneous every 6 hours  sodium phosphate IVPB 30 milliMole(s) IV Intermittent once    MEDICATIONS  (PRN):  dextrose 40% Gel 15 Gram(s) Oral once PRN Blood Glucose LESS THAN 70 milliGRAM(s)/deciliter  glucagon  Injectable 1 milliGRAM(s) IntraMuscular once PRN Glucose LESS THAN 70 milligrams/deciliter  morphine  - Injectable 1 milliGRAM(s) IV Push every 3 hours PRN dyspnea    Allergies    No Known Allergies    Intolerances          FAMILY HISTORY:  No pertinent neurological family history in first degree relatives      Social History  Lived at home     Vital Signs Last 24 Hrs  T(C): 37.1 (09 Jun 2019 10:11), Max: 37.6 (09 Jun 2019 06:17)  T(F): 98.8 (09 Jun 2019 10:11), Max: 99.6 (09 Jun 2019 06:17)  HR: 115 (09 Jun 2019 10:31) (110 - 163)  BP: 155/83 (09 Jun 2019 10:11) (155/83 - 193/110)  BP(mean): --  RR: 27 (09 Jun 2019 10:11) (15 - 30)  SpO2: 98% (09 Jun 2019 10:31) (95% - 100%)  Daily Height in cm: 170.18 (09 Jun 2019 06:17)    Daily       GENERAL PHYSICAL EXAM  All physical exam findings normal, except for those marked:  General:	acutely ill-appearing  HEENT:	normocephalic, atraumatic   Neck:          supple, full range of motion   Cardiovascular:	 normal S1, S2   Abdominal	:                    soft, ND, NT  Extremities:	no joint swelling       NEUROLOGIC EXAM  Mental Status:     Pt obtunded, does not arouse to painful stimuli, groans   Pupils minimally reactive b/l, no gaze deviation noted, oculocephalic intact,   w/d to painful stimuli left > right  UE and LE   Reflexes 1/4 throughout  plantars upgoing on right, down on left     Lab Results:                        15.0   17.54 )-----------( 378      ( 09 Jun 2019 06:06 )             48.2     06-09    142  |  102  |  15  ----------------------------<  360<H>  3.6   |  22  |  0.81    Ca    9.6      09 Jun 2019 06:06  Phos  2.4     06-09  Mg     1.4     06-09    TPro  8.8<H>  /  Alb  3.9  /  TBili  1.3<H>  /  DBili  x   /  AST  72<H>  /  ALT  28  /  AlkPhos  436<H>  06-08    LIVER FUNCTIONS - ( 08 Jun 2019 20:15 )  Alb: 3.9 g/dL / Pro: 8.8 g/dL / ALK PHOS: 436 u/L / ALT: 28 u/L / AST: 72 u/L / GGT: x           PT/INR - ( 09 Jun 2019 06:06 )   PT: 13.5 SEC;   INR: 1.21          PTT - ( 08 Jun 2019 21:56 )  PTT:28.7 SEC    Imaging Studies:  < from: CT Head No Cont (06.08.19 @ 20:46) >  IMPRESSION:    Acute infarcts involving nearly the entirety of the left MCA and PCA   territories territory infarcts. Findings may be seen in the setting of a   proximal left internal carotid artery occlusion with associated fetal   origin of the left posterior cerebral artery.    < end of copied text >

## 2019-06-09 NOTE — H&P ADULT - PROBLEM SELECTOR PLAN 3
- patient with afib with RVR on metoprolol and xeralto at home  - likely non compliant with medications and may have triggered this - patient with afib with RVR on metoprolol and Xarelto at home  - likely non compliant with medications and may have triggered this  - holding all AC at this time

## 2019-06-09 NOTE — H&P ADULT - HISTORY OF PRESENT ILLNESS
88 y/o F with Afib on Xarelto, HTN, HLD, uncontrolled T2DM (A1c 11.6 04/2019) and recent admission for right hip fracture s/p nail fixation BIBEMS for unresponsiveness at home. Per daughter at bedside, the patient was found down in her apartment, incontinent of urine with blood vs. blood-tinged vomitus around mouth by EMS sent by Physical Therapist after patient did not respond to multiple calls today. The daughter spoke with the patient last yesterday evening. Patient complained only of uncontrolled right hip pain. Patient resides alone and previously capable of ADLs independently. Now dependent on walker on wheels for ambulation and refused home services/home health aide after discharge from [facility] about 2 weeks ago. With the exception of opioids, the daughter believes that her mother has not been taking her medications as prescribed or eating adequately since returning home. On chart review, patient was discharge on Lantus 22U qhs and Humalog 8 U TID with meals but the patient's daughter says that the patient did not want to take insulin.     The patient was a code stroke in the ED after CT head showed an acute L sided MCA/PCA stroke. She was also found to be in afib with RVR with FS in the 400s. She was initially started on a cardizem drip and insulin drip and MICU was consulted. She was not deemed a MICU candidate given her VBG did not show acidosis suggesting she was not in DKA and did not require an insulin drip. The patient's daughter at the bedside states that her father just passed away from a stroke 3 months ago. She had initiated a GOC with her mother but she did not want to participate. She feels her mother would not want aggressive interventions since she was previously independent but is not ready to make a decision at this time.

## 2019-06-09 NOTE — CONSULT NOTE ADULT - SUBJECTIVE AND OBJECTIVE BOX
Patient is an 89 yo woman with uncontrolled T2DM (A1c of 11.6%), osteoporosis with right hip fracture in April, HTN, HLD, Afib on Xarelto.  The patient was found by her physical therapist after not responding to multiple calls.  She was found to have had a stroke. CT scan showed an acute L sided MCA/PCA stroke. Endocrine was asked to see for uncontrolled diabetes    Patient with agonal breathing on oxygen via face mask. Daughter is at bedside provides some collateral history. Patient has long-standing history of diabetes was on oral medicines prior to her hospitalization in April for a hip fractures. States there was likely non-adherence at home with diabetes medicines. The patient was not checking fingersticks, meals were not tightly regulated.  She was initiated on basal/bolus insulin during the last admission and per daughter was receiving insulin doses at rehab. She was discharged from rehab about 3 weeks ago after which point she was non-adherent at home.    Osteoporosis: hip fracture in April, not on any osteoporosis medicines. Actos was discontinued in April     PAST MEDICAL & SURGICAL HISTORY:  Atrial fibrillation, unspecified type  HLD (hyperlipidemia)  DM2 (diabetes mellitus, type 2)  HTN (hypertension)  Arthritis involving multiple sites  S/P hip replacement  Osteoporosis    FAMILY HISTORY:  No pertinent family history in first degree relatives    Social History:  Non smoker  No recreational drugs  No alcohol    Outpatient Medications:  Unclear at this time, but per daughter patient was not injecting insulinNon-adherence to oral diabetes medicines in the past  · 	oxyCODONE 5 mg oral tablet: Last Dose Taken:  , 1 tab(s) orally every 4 hours as needed for Severe Pain  	begin tapering off as pain decreases   · 	DilTIAZem Hydrochloride  mg/24 hours oral capsule, extended release: Last Dose Taken:  , 1 cap(s) orally once a day  · 	PARoxetine 20 mg oral tablet: Last Dose Taken:  , 1 tab(s) orally once a day  · 	Xarelto 15 mg oral tablet: Last Dose Taken:  , 1 tab(s) orally once a day (in the evening)  · 	metoprolol succinate 25 mg oral tablet, extended release: 1 tab(s) orally once a day  · 	glipiZIDE 10 mg oral tablet: 1 tab(s) orally once a day  · 	pioglitazone 30 mg oral tablet: 1 tab(s) orally once a day  · 	SITagliptin 50 mg oral tablet: 1 tab(s) orally once a day  ·	hydroCHLOROthiazide 25 mg oral tablet: 1 tab(s) orally once a day    MEDICATIONS  (STANDING):  ALBUTerol/ipratropium for Nebulization 3 milliLiter(s) Nebulizer every 6 hours  dextrose 5%. 1000 milliLiter(s) (50 mL/Hr) IV Continuous <Continuous>  dextrose 50% Injectable 12.5 Gram(s) IV Push once  dextrose 50% Injectable 25 Gram(s) IV Push once  dextrose 50% Injectable 25 Gram(s) IV Push once  diltiazem Infusion 20 mG/Hr (20 mL/Hr) IV Continuous <Continuous>  insulin glargine Injectable (LANTUS) 5 Unit(s) SubCutaneous at bedtime  insulin lispro (HumaLOG) corrective regimen sliding scale   SubCutaneous every 6 hours  insulin lispro Injectable (HumaLOG) 2 Unit(s) SubCutaneous every 6 hours    MEDICATIONS  (PRN):  dextrose 40% Gel 15 Gram(s) Oral once PRN Blood Glucose LESS THAN 70 milliGRAM(s)/deciliter  glucagon  Injectable 1 milliGRAM(s) IntraMuscular once PRN Glucose LESS THAN 70 milligrams/deciliter  morphine  - Injectable 1 milliGRAM(s) IV Push every 3 hours PRN dyspnea    Allergies  No Known Allergies    Review of Systems:  Constitutional: No fever  Eyes: No blurry vision  Neuro: found unresponsive, + stroke (see HPI)  HEENT: No pain  Cardiovascular: No chest pain, palpitations  Respiratory: No SOB, no cough  GI: No nausea, vomiting, abdominal pain  : No dysuria  Psych: unable to assess  ALL OTHER SYSTEMS REVIEWED AND NEGATIVE    PHYSICAL EXAM:  VITALS: T(C): 37.1 (06-09-19 @ 10:11)  T(F): 98.8 (06-09-19 @ 10:11), Max: 99.6 (06-09-19 @ 06:17)  HR: 115 (06-09-19 @ 10:31) (110 - 163)  BP: 155/83 (06-09-19 @ 10:11) (155/83 - 193/110)  RR:  (15 - 30)  SpO2:  (95% - 100%)  Wt(kg): --  GENERAL: unresponsive, breathing with oxygen face mask  HEENT:  Dry mucous membranes  RESPIRATORY: shallow breathing  CARDIOVASCULAR: Regular rate and rhythm; No murmurs; no peripheral edema  GI: Soft, nontender, non distended, normal bowel sounds  SKIN: Deferred as patient has inflatable heel boots on at this time  NEURO: non responsive    POCT Blood Glucose.: 258 mg/dL (06-09-19 @ 12:56)  POCT Blood Glucose.: 304 mg/dL (06-09-19 @ 06:15)  POCT Blood Glucose.: 228 mg/dL (06-09-19 @ 00:33)  POCT Blood Glucose.: 250 mg/dL (06-08-19 @ 23:23)  POCT Blood Glucose.: 317 mg/dL (06-08-19 @ 22:21)  POCT Blood Glucose.: 324 mg/dL (06-08-19 @ 21:15)  POCT Blood Glucose.: 422 mg/dL (06-08-19 @ 20:23)                          15.0   17.54 )-----------( 378      ( 09 Jun 2019 06:06 )             48.2       06-09    142  |  102  |  15  ----------------------------<  360<H>  3.6   |  22  |  0.81    EGFR if : 75  EGFR if non : 64    Ca    9.6      06-09  Mg     1.4     06-09  Phos  2.4     06-09    TPro  8.8<H>  /  Alb  3.9  /  TBili  1.3<H>  /  DBili  x   /  AST  72<H>  /  ALT  28  /  AlkPhos  436<H>  06-08    Hemoglobin A1C, Whole Blood: 11.6 % <H> [4.0 - 5.6] (04-28-19 @ 06:20)    06-09 Chol 192  HDL 45 Trig 90, 05-23 Chol 151 LDL 94 HDL 38<L> Trig 93    Radiology:   EXAM:  CT BRAIN        PROCEDURE DATE:  Jun 8 2019         INTERPRETATION:  HISTORY: Altered mental status, vomiting, on Xarelto,   evaluate for intracranial hemorrhage.    Technique: CT of the head was performed without intravenous contrast.    Multiple contiguous axial images were acquired from the skullbase to the   vertex without the administration of intravenous contrast.  Coronal and   sagittal reformations were made.    COMPARISON: CT head 4/26/2019    FINDINGS:  Large area of hypodensity involving the left frontal, parietal, temporal   and occipital lobes with loss of gray-white matter differentiation and   sulcal effacement, new since 4/26/2019, most consistent with acute left   MCA and PCA territory infarcts. There is sparing of the left anterior   cerebral artery territory. Hypodensity also involves the posterior limb   of the left internal capsule and thalamus, also new since 4/26/2019,   likely representing additional acute infarct. Mild patchy hypodensities   within the periventricular and subcortical white matter, although   nonspecific, likely reflect chronic microvascular disease.    No acute intracranial hemorrhage, midline shift, extra-axial collection,   or hydrocephalus.    No acute intracranial hemorrhage, significant paranasal sinuses and   mastoid air cells are clear. Status post bilateral cataract surgery.   Visualized osseous structures are intact.     IMPRESSION:    Acute infarcts involving nearly the entirety of the left MCA and PCA   territories territory infarcts. Findings may be seen in the setting of a   proximal left internal carotid artery occlusion with associated fetal   origin of the left posterior cerebral artery.    Findings discussed by Dr. Gonzalez to Dr. Wagner on 6/8/2019 at 8:55 PM.

## 2019-06-09 NOTE — CONSULT NOTE ADULT - ASSESSMENT
89 year old woman with Afib on Xarelto, HTN, HLD, uncontrolled T2DM  and recent right hip fracture s/p nail fixation now minimally responsive with acute ischemic strokes in left MCA and PCA distribution likely cardioembolic etiology in setting of Afib with RVR and medication non-adherence. MICU consulted for diabetic ketoacidosis in setting of hyperglycemia however pH remains normal on serial VBGs.     1. Encephalopathy secondary to acute ischemic strokes vs. subclinical seizure  - agree with Neurology recommendations for stroke evaluation  - left gaze deviation with  2. 89 year old woman with Afib on Xarelto, HTN, HLD, uncontrolled T2DM  and recent right hip fracture s/p nail fixation now minimally responsive with acute ischemic strokes in left MCA and PCA distribution likely cardioembolic etiology in setting of Afib with RVR and medication non-adherence. MICU consulted for DKA in setting of hyperglycemia however pH remains normal on serial VBGs which is less suggestive of DKA.     1. Acute ischemic strokes  - likely cardioembolic in setting of atrial fibrillation and non-adherence with rate control medications and anticoagulation  - full stroke evaluation per Neurology   - admit to telemetry/stroke bed  2. Encephalopathy  - likely secondary to MCA ischemic stroke vs. subclinical seizure given left gaze deviation, elevated lactate and urinary incontinence   - recommend video EEG for further evaluate for seizures  - patient currently protecting airway, no need for emergent intubation  3. Uncontrolled T2DM with hyperglycemia  - low suspicion for DKA given normal pH on serial VBGs  - serum glucose improved on low dose insulin drip; stop insulin drip and give subcutaneous basal insulin  - monitor FSG q4h  4. Afib with RVR HR 130s  - unimproved on diltiazem infusion at 20; stop diltiazem and given amiodarone 150 IVP  - resume anticoagulation with heparin drip   -   5. Goals of care  - 89 year old woman with Afib on Xarelto, HTN, HLD, uncontrolled T2DM  and recent right hip fracture s/p nail fixation now minimally responsive with acute ischemic strokes in left MCA and PCA distribution likely cardioembolic etiology in setting of Afib with RVR and medication non-adherence. MICU consulted for DKA in setting of hyperglycemia however pH remains normal on serial VBGs which is less suggestive of DKA.     1. Acute ischemic stroke with right hemiparesis, global aphasia  - likely cardioembolic etiology from uncontrolled Afib and non-adherence with rate control meds and anticoagulation  - admit to telemetry/stroke bed with full stroke evaluation per Neurology   - repeat CT head in 24 hours    2. Encephalopathy  - likely secondary to MCA ischemic stroke vs. subclinical seizure given left gaze deviation, elevated lactate and urinary incontinence   - recommend video EEG for further evaluate for seizures  - patient currently protecting airway, no need for emergent intubation    3. Uncontrolled T2DM with hyperglycemia  - low suspicion for DKA given normal pH on serial VBGs  - serum glucose improved on low dose insulin drip; stop insulin drip and give subcutaneous basal insulin now   - keep NPO and monitor FSG q6h in setting of encephalopathy    4. Afib with RVR HR 130s  - on diltiazem infusion at 20 mg/hr; consider amiodarone 150 IVP for improved rate control  - resume anticoagulation with heparin drip after 24-hour CT head     5. Goals of care  - 89 year old woman with Afib on Xarelto, HTN, HLD, uncontrolled T2DM  and recent right hip fracture s/p nail fixation now minimally responsive with acute ischemic strokes in left MCA and PCA distribution likely cardioembolic etiology in setting of Afib with RVR and medication non-adherence. MICU consulted for DKA in setting of hyperglycemia however pH remains normal on serial VBGs which is less suggestive of DKA.     1. Acute ischemic stroke with right hemiparesis and global aphasia  - likely cardioembolic etiology from uncontrolled Afib and non-adherence with rate control meds and anticoagulation  - admit to telemetry/stroke bed with full stroke evaluation per Neurology   - repeat CT head in 24 hours    2. Encephalopathy responsive only to noxious stimuli  - likely secondary to MCA ischemic stroke vs. subclinical seizure given left gaze deviation, elevated lactate and urinary incontinence - no response to Ativan 1 mg IVP given in ED  - recommend video EEG to evaluate for seizures and further discussion with Neurology  - no need for emergent intubation as patient protecting airway at this time    3. Uncontrolled T2DM with hyperglycemia  - low suspicion for DKA given normal pH on serial VBGs  - serum glucose improved, now 200s on low dose insulin drip; stop insulin drip and give subcutaneous basal insulin now    - keep NPO and monitor FSG q6h given degree of encephalopathy    4. Afib with RVR HR 130s  - on diltiazem infusion at 20 mg/hr; consider amiodarone 150 IVP for improved rate control  - resume anticoagulation with heparin drip after 24-hour CT head     5. Goals of care  - re-address with daughter who is healthcare surrogate    Cecelia Mcgrath PGY-3  MICU Resident  Pager [phone].

## 2019-06-09 NOTE — H&P ADULT - PROBLEM SELECTOR PLAN 9
- Holding all chemical AC given acute CVA  - NPO  - poor prognosis at this time     Radha Anand  PGY 2 Night Admit  Pager [phone]

## 2019-06-09 NOTE — CONSULT NOTE ADULT - SUBJECTIVE AND OBJECTIVE BOX
Cecelia Mcgrath MD  PGY-3|MICU Resident  Pager [phone]    CHIEF COMPLAINT:    HPI:    PAST MEDICAL & SURGICAL HISTORY:  Atrial fibrillation, unspecified type  HLD (hyperlipidemia)  DM2 (diabetes mellitus, type 2)  HTN (hypertension)  Arthritis involving multiple sites  S/P hip replacement      FAMILY HISTORY:  No pertinent family history in first degree relatives      SOCIAL HISTORY:  Smoking: [ ] Never Smoked [ ] Former Smoker (__ packs x ___ years) [ ] Current Smoker  (__ packs x ___ years)  Substance Use: [ ] Never Used [ ] Used ____  EtOH Use:  Marital Status: [ ] Single [ ]  [ ]  [ ]   Sexual History:   Occupation:  Recent Travel:  Country of Birth:  Advance Directives:    ALLERGIES: No Known Allergies    Intolerances    HOME MEDICATIONS:    REVIEW OF SYSTEMS:    [x ] Unable to assess ROS because patient    OBJECTIVE:  ICU Vital Signs Last 24 Hrs  T(C): 36.8 (2019 23:45), Max: 36.8 (2019 20:45)  T(F): 98.2 (2019 23:45), Max: 98.3 (2019 20:45)  HR: 114 (2019 02:16) (112 - 163)  BP: 175/84 (2019 02:16) (159/81 - 193/110)  BP(mean): --  ABP: --  ABP(mean): --  RR: 16 (2019 02:16) (15 - 20)  SpO2: 97% (2019 02:16) (95% - 100%)      CAPILLARY BLOOD GLUCOSE    POCT Blood Glucose.: 228 mg/dL (2019 00:33)      PHYSICAL EXAM:  GENERAL: AOx3, NAD/ill-appearing, appears stated age   HEENT: pupils equal & reactive, no scleral icterus  NECK: supple, trachea midline, no thyromegaly  CARDIAC: S1 & S2 auscultated, RRR, no murmurs, no JVD   CHEST: breath sounds equal B/L, no increased WOB, no wheezes/crackles  ABDOMEN: ND, bowel sounds present, soft, NTTP, no organomegaly   NEURO: CN II-XII grossly intact, no focal deficits  MSKl: Strength appropriate in all extremities for age, ROM normal  SKIN: Warm and dry, no diffuse rashes, no wounds  PSYCH: Speech fluid, appropriate mood and affect  EXT: No LE edema, pedal pulses 2+ and equal B/L, capillary refill < 2 s    LINES:     HOSPITAL MEDICATIONS:    diltiazem Infusion 5 mG/Hr IV Continuous <Continuous>  insulin regular Infusion 3 Unit(s)/Hr IV Continuous <Continuous>  dextrose 5% + sodium chloride 0.45% with potassium chloride 20 mEq/L 1000 milliLiter(s) IV Continuous <Continuous>      LABS:                        17.4   11.16 )-----------( 419      ( 2019 20:15 )             55.6     Hgb Trend: 17.4<--      143  |  102  |  17  ----------------------------<  384<H>  3.7   |  18<L>  |  0.95    Ca    9.0      2019 21:35    TPro  8.8<H>  /  Alb  3.9  /  TBili  1.3<H>  /  DBili  x   /  AST  72<H>  /  ALT  28  /  AlkPhos  436<H>      Creatinine Trend: 0.95<--, 0.90<--, 0.93<--, 1.03<--  PT/INR - ( 2019 21:56 )   PT: 14.5 SEC;   INR: 1.26          PTT - ( 2019 21:56 )  PTT:28.7 SEC  Urinalysis Basic - ( 2019 22:50 )    Color: YELLOW / Appearance: CLEAR / S.020 / pH: 6.5  Gluc: >1000 / Ketone: MODERATE  / Bili: NEGATIVE / Urobili: NORMAL   Blood: MODERATE / Protein: >600 / Nitrite: NEGATIVE   Leuk Esterase: NEGATIVE / RBC: 11-25 / WBC 0-2   Sq Epi: FEW / Non Sq Epi: x / Bacteria: NEGATIVE        Venous Blood Gas:   @ 20:15  7.38/45/58/25/86.5  VBG Lactate: 6.0      MICROBIOLOGY:     RADIOLOGY:  [ ] Reviewed and interpreted by me    EKG: Cecelia Mcgrath MD  PGY-3|MICU Resident  Pager [phone]    CHIEF COMPLAINT:    HPI:    89 year old woman with Afib on Xarelto, HTN, HLD, uncontrolled T2DM (A1c 11.6 2019) and recent admission for right hip fracture s/p nail fixation BIBEMS for unresponsiveness at home. Patient found down in apartment, incontinent of urine with blood vs. blood-tinged vomitus around mouth by EMS sent by Physical Therapist after patient did not respond to multiple calls today. Collateral history obtained from daughter at bedside who last spoke with patient the night prior. Patient complained only of uncontrolled right hip pain. Patient resides alone, capable of ADLs independently and refused home services/private home health aide after discharge from [facility] approx. 2 weeks ago. With exception of opioids, daughter believes patient has not been taking her medications for A fib and T2DM as prescribed, eating or leaving the home.     PAST MEDICAL & SURGICAL HISTORY:  Atrial fibrillation, unspecified type  HLD (hyperlipidemia)  DM2 (diabetes mellitus, type 2)  HTN (hypertension)  Arthritis involving multiple sites  S/P hip replacement      FAMILY HISTORY:  No pertinent family history in first degree relatives      SOCIAL HISTORY:  Smoking: [ ] Never Smoked [ ] Former Smoker (__ packs x ___ years) [ ] Current Smoker  (__ packs x ___ years)  Substance Use: [ ] Never Used [ ] Used ____  EtOH Use:  Marital Status: [ ] Single [ ]  [ ]  [ ]   Sexual History:   Occupation:  Recent Travel:  Country of Birth:  Advance Directives:    ALLERGIES: No Known Allergies    Intolerances    HOME MEDICATIONS:    REVIEW OF SYSTEMS:    [x ] Unable to assess ROS because patient    OBJECTIVE:  ICU Vital Signs Last 24 Hrs  T(C): 36.8 (2019 23:45), Max: 36.8 (2019 20:45)  T(F): 98.2 (2019 23:45), Max: 98.3 (2019 20:45)  HR: 114 (2019 02:16) (112 - 163)  BP: 175/84 (2019 02:16) (159/81 - 193/110)  BP(mean): --  ABP: --  ABP(mean): --  RR: 16 (2019 02:16) (15 - 20)  SpO2: 97% (2019 02:16) (95% - 100%)      CAPILLARY BLOOD GLUCOSE    POCT Blood Glucose.: 228 mg/dL (2019 00:33)      PHYSICAL EXAM:  GENERAL: AOx3, NAD/ill-appearing, appears stated age   HEENT: pupils equal & reactive, no scleral icterus  NECK: supple, trachea midline, no thyromegaly  CARDIAC: S1 & S2 auscultated, RRR, no murmurs, no JVD   CHEST: breath sounds equal B/L, no increased WOB, no wheezes/crackles  ABDOMEN: ND, bowel sounds present, soft, NTTP, no organomegaly   NEURO: CN II-XII grossly intact, no focal deficits  MSKl: Strength appropriate in all extremities for age, ROM normal  SKIN: Warm and dry, no diffuse rashes, no wounds  PSYCH: Speech fluid, appropriate mood and affect  EXT: No LE edema, pedal pulses 2+ and equal B/L, capillary refill < 2 s    LINES:     HOSPITAL MEDICATIONS:    diltiazem Infusion 5 mG/Hr IV Continuous <Continuous>  insulin regular Infusion 3 Unit(s)/Hr IV Continuous <Continuous>  dextrose 5% + sodium chloride 0.45% with potassium chloride 20 mEq/L 1000 milliLiter(s) IV Continuous <Continuous>      LABS:                        17.4   11.16 )-----------( 419      ( 2019 20:15 )             55.6     Hgb Trend: 17.4<--      143  |  102  |  17  ----------------------------<  384<H>  3.7   |  18<L>  |  0.95    Ca    9.0      2019 21:35    TPro  8.8<H>  /  Alb  3.9  /  TBili  1.3<H>  /  DBili  x   /  AST  72<H>  /  ALT  28  /  AlkPhos  436<H>      Creatinine Trend: 0.95<--, 0.90<--, 0.93<--, 1.03<--  PT/INR - ( 2019 21:56 )   PT: 14.5 SEC;   INR: 1.26          PTT - ( 2019 21:56 )  PTT:28.7 SEC  Urinalysis Basic - ( 2019 22:50 )    Color: YELLOW / Appearance: CLEAR / S.020 / pH: 6.5  Gluc: >1000 / Ketone: MODERATE  / Bili: NEGATIVE / Urobili: NORMAL   Blood: MODERATE / Protein: >600 / Nitrite: NEGATIVE   Leuk Esterase: NEGATIVE / RBC: 11-25 / WBC 0-2   Sq Epi: FEW / Non Sq Epi: x / Bacteria: NEGATIVE        Venous Blood Gas:   @ 20:15  7.38/45/58/25/86.5  VBG Lactate: 6.0      MICROBIOLOGY:     RADIOLOGY:  [ ] Reviewed and interpreted by me    EKG: Cecelia Mcgrath PGY3  MICU Resident  Pager [phone]    CHIEF COMPLAINT:    HPI:    89 year old woman with Afib on Xarelto, HTN, HLD, uncontrolled T2DM (A1c 11.6 2019) and recent admission for right hip fracture s/p nail fixation BIBEMS for unresponsiveness at home. Patient found down in apartment, incontinent of urine with blood vs. blood-tinged vomitus around mouth by EMS sent by Physical Therapist after patient did not respond to multiple calls today. Collateral history obtained from daughter at bedside who last spoke with patient the night prior. Patient complained only of uncontrolled right hip pain. Patient resides alone and previously capable of ADLs independently. Now dependent on walker on wheels for ambulation and refused home services/home health aide after discharge from [facility] approx. 2 weeks ago. With exception of opioids, daughter believes patient has not been taking her medications as prescribed or eating adequately since returning home. On chart review, patient was discharge on Lantus 22U qhs and Humalog 8 U TID with meals however daughter reports patient was never agreeable to insulin therapy. Medications at bedside brought by EMS include metoprolol succinate, diltiazem ER, glipizide, pioglitazone, paroxetine, sitagliptin and oxycodone.      PAST MEDICAL & SURGICAL HISTORY:  Atrial fibrillation, unspecified type  HLD (hyperlipidemia)  DM2 (diabetes mellitus, type 2)  HTN (hypertension)  Arthritis involving multiple sites  S/P hip replacement      FAMILY HISTORY:  No pertinent family history in first degree relatives      SOCIAL HISTORY:  Smoking: [ ] Never Smoked [ ] Former Smoker (__ packs x ___ years) [ ] Current Smoker  (__ packs x ___ years)  Substance Use: [ ] Never Used [ ] Used ____  EtOH Use:  Marital Status: [ ] Single [ ]  [ ]  [ ]   Sexual History:   Occupation:  Recent Travel:  Country of Birth:  Advance Directives:    ALLERGIES: No Known Allergies    Intolerances    HOME MEDICATIONS:    REVIEW OF SYSTEMS:    [x ] Unable to assess ROS because patient    OBJECTIVE:  ICU Vital Signs Last 24 Hrs  T(C): 36.8 (2019 23:45), Max: 36.8 (2019 20:45)  T(F): 98.2 (2019 23:45), Max: 98.3 (2019 20:45)  HR: 114 (2019 02:16) (112 - 163)  BP: 175/84 (2019 02:16) (159/81 - 193/110)  BP(mean): --  ABP: --  ABP(mean): --  RR: 16 (2019 02:16) (15 - 20)  SpO2: 97% (2019 02:16) (95% - 100%)      CAPILLARY BLOOD GLUCOSE    POCT Blood Glucose.: 228 mg/dL (2019 00:33)      PHYSICAL EXAM:  GENERAL: AOx3, NAD/ill-appearing, appears stated age   HEENT: pupils equal & reactive, no scleral icterus  NECK: supple, trachea midline, no thyromegaly  CARDIAC: S1 & S2 auscultated, RRR, no murmurs, no JVD   CHEST: breath sounds equal B/L, no increased WOB, no wheezes/crackles  ABDOMEN: ND, bowel sounds present, soft, NTTP, no organomegaly   NEURO: CN II-XII grossly intact, no focal deficits  MSKl: Strength appropriate in all extremities for age, ROM normal  SKIN: Warm and dry, no diffuse rashes, no wounds  PSYCH: Speech fluid, appropriate mood and affect  EXT: No LE edema, pedal pulses 2+ and equal B/L, capillary refill < 2 s    LINES:     HOSPITAL MEDICATIONS:    diltiazem Infusion 5 mG/Hr IV Continuous <Continuous>  insulin regular Infusion 3 Unit(s)/Hr IV Continuous <Continuous>  dextrose 5% + sodium chloride 0.45% with potassium chloride 20 mEq/L 1000 milliLiter(s) IV Continuous <Continuous>      LABS:                        17.4   11.16 )-----------( 419      ( 2019 20:15 )             55.6     Hgb Trend: 17.4<--  08    143  |  102  |  17  ----------------------------<  384<H>  3.7   |  18<L>  |  0.95    Ca    9.0      2019 21:35    TPro  8.8<H>  /  Alb  3.9  /  TBili  1.3<H>  /  DBili  x   /  AST  72<H>  /  ALT  28  /  AlkPhos  436<H>  06-08    Creatinine Trend: 0.95<--, 0.90<--, 0.93<--, 1.03<--  PT/INR - ( 2019 21:56 )   PT: 14.5 SEC;   INR: 1.26          PTT - ( 2019 21:56 )  PTT:28.7 SEC  Urinalysis Basic - ( 2019 22:50 )    Color: YELLOW / Appearance: CLEAR / S.020 / pH: 6.5  Gluc: >1000 / Ketone: MODERATE  / Bili: NEGATIVE / Urobili: NORMAL   Blood: MODERATE / Protein: >600 / Nitrite: NEGATIVE   Leuk Esterase: NEGATIVE / RBC: 11-25 / WBC 0-2   Sq Epi: FEW / Non Sq Epi: x / Bacteria: NEGATIVE        Venous Blood Gas:   @ 20:15  7.38/45/58/25/86.5  VBG Lactate: 6.0      MICROBIOLOGY:     RADIOLOGY:  [ ] Reviewed and interpreted by me    EKG: Cecelia Mcgrath PGY3  MICU Resident  Pager [phone]    CHIEF COMPLAINT:    HPI:    89 year old woman with Afib on Xarelto, HTN, HLD, uncontrolled T2DM (A1c 11.6 2019) and recent admission for right hip fracture s/p nail fixation BIBEMS for unresponsiveness at home. Patient found down in apartment, incontinent of urine with blood vs. blood-tinged vomitus around mouth by EMS sent by Physical Therapist after patient did not respond to multiple calls today. Collateral history obtained from daughter at bedside who last spoke with patient the night prior. Patient complained only of uncontrolled right hip pain. Patient resides alone and previously capable of ADLs independently. Now dependent on walker on wheels for ambulation and refused home services/home health aide after discharge from [facility] approx. 2 weeks ago. With exception of opioids, daughter believes patient has not been taking her medications as prescribed or eating adequately since returning home. On chart review, patient was discharge on Lantus 22U qhs and Humalog 8 U TID with meals however daughter reports patient was never agreeable to insulin therapy. Medications at bedside brought by EMS include metoprolol succinate, diltiazem ER, glipizide, pioglitazone, paroxetine, sitagliptin and oxycodone.      PAST MEDICAL & SURGICAL HISTORY:  Atrial fibrillation, unspecified type  HLD (hyperlipidemia)  DM2 (diabetes mellitus, type 2)  HTN (hypertension)  Arthritis involving multiple sites  S/P hip replacement      FAMILY HISTORY:  No pertinent family history in first degree relatives      SOCIAL HISTORY:  Smoking: [x ] Never Smoked [ ] Former Smoker (__ packs x ___ years) [ ] Current Smoker  (__ packs x ___ years)  Substance Use: [x ] Never Used [ ] Used ____  EtOH Use: Rarely  Marital Status: [ ] Single [ ]  [ ]  [x ]   Sexual History: Unable to obtain  Occupation: Unable to obtain  Recent Travel: Unable to obtain  Country of Birth: Unable to obtain  Advance Directives: Unable to obtain    ALLERGIES: No Known Allergies    Intolerances    HOME MEDICATIONS:    REVIEW OF SYSTEMS:    [x ] Unable to assess ROS because patient    OBJECTIVE:  ICU Vital Signs Last 24 Hrs  T(C): 36.8 (2019 23:45), Max: 36.8 (2019 20:45)  T(F): 98.2 (2019 23:45), Max: 98.3 (2019 20:45)  HR: 114 (2019 02:16) (112 - 163)  BP: 175/84 (2019 02:16) (159/81 - 193/110)  BP(mean): --  ABP: --  ABP(mean): --  RR: 16 (2019 02:16) (15 - 20)  SpO2: 97% (2019 02:16) (95% - 100%)    CAPILLARY BLOOD GLUCOSE    POCT Blood Glucose.: 228 mg/dL (2019 00:33)      PHYSICAL EXAM:  GENERAL: AOx3, NAD/ill-appearing, appears stated age   HEENT: pupils equal & reactive, no scleral icterus  NECK: supple, trachea midline, no thyromegaly  CARDIAC: S1 & S2 auscultated, RRR, no murmurs, no JVD   CHEST: breath sounds equal B/L, no increased WOB, no wheezes/crackles  ABDOMEN: ND, bowel sounds present, soft, NTTP, no organomegaly   NEURO: CN II-XII grossly intact, no focal deficits  MSKl: Strength appropriate in all extremities for age, ROM normal  SKIN: Warm and dry, no diffuse rashes, no wounds  PSYCH: Speech fluid, appropriate mood and affect  EXT: No LE edema, pedal pulses 2+ and equal B/L, capillary refill < 2 s    LINES:     HOSPITAL MEDICATIONS:    diltiazem Infusion 5 mG/Hr IV Continuous <Continuous>  insulin regular Infusion 3 Unit(s)/Hr IV Continuous <Continuous>  dextrose 5% + sodium chloride 0.45% with potassium chloride 20 mEq/L 1000 milliLiter(s) IV Continuous <Continuous>      LABS:                        17.4   11.16 )-----------( 419      ( 2019 20:15 )             55.6     Hgb Trend: 17.4<--  -08    143  |  102  |  17  ----------------------------<  384<H>  3.7   |  18<L>  |  0.95    Ca    9.0      2019 21:35    TPro  8.8<H>  /  Alb  3.9  /  TBili  1.3<H>  /  DBili  x   /  AST  72<H>  /  ALT  28  /  AlkPhos  436<H>      Creatinine Trend: 0.95<--, 0.90<--, 0.93<--, 1.03<--  PT/INR - ( 2019 21:56 )   PT: 14.5 SEC;   INR: 1.26          PTT - ( 2019 21:56 )  PTT:28.7 SEC  Urinalysis Basic - ( 2019 22:50 )    Color: YELLOW / Appearance: CLEAR / S.020 / pH: 6.5  Gluc: >1000 / Ketone: MODERATE  / Bili: NEGATIVE / Urobili: NORMAL   Blood: MODERATE / Protein: >600 / Nitrite: NEGATIVE   Leuk Esterase: NEGATIVE / RBC: 11-25 / WBC 0-2   Sq Epi: FEW / Non Sq Epi: x / Bacteria: NEGATIVE        Venous Blood Gas:   @ 20:15  7.38/45/58/25/86.5  VBG Lactate: 6.0      MICROBIOLOGY:     RADIOLOGY:  [ ] Reviewed and interpreted by me    EKG: Cecelia Mcgrath PGY3  MICU Resident  Pager [phone]    CHIEF COMPLAINT:    HPI:    89 year old woman with Afib on Xarelto, HTN, HLD, uncontrolled T2DM (A1c 11.6 2019) and recent admission for right hip fracture s/p nail fixation BIBEMS for unresponsiveness at home. Patient found down in apartment, incontinent of urine with blood vs. blood-tinged vomitus around mouth by EMS sent by Physical Therapist after patient did not respond to multiple calls today. Collateral history obtained from daughter at bedside who last spoke with patient the night prior. Patient complained only of uncontrolled right hip pain. Patient resides alone and previously capable of ADLs independently. Now dependent on walker on wheels for ambulation and refused home services/home health aide after discharge from [facility] approx. 2 weeks ago. With exception of opioids, daughter believes patient has not been taking her medications as prescribed or eating adequately since returning home. On chart review, patient was discharge on Lantus 22U qhs and Humalog 8 U TID with meals however daughter reports patient was never agreeable to insulin therapy. Medications at bedside brought by EMS include metoprolol succinate, diltiazem ER, glipizide, pioglitazone, paroxetine, sitagliptin and oxycodone.      PAST MEDICAL & SURGICAL HISTORY:  Atrial fibrillation, unspecified type  HLD (hyperlipidemia)  DM2 (diabetes mellitus, type 2)  HTN (hypertension)  Arthritis involving multiple sites  S/P hip replacement      FAMILY HISTORY:  No pertinent family history in first degree relatives      SOCIAL HISTORY:  Smoking: [x ] Never Smoked [ ] Former Smoker (__ packs x ___ years) [ ] Current Smoker  (__ packs x ___ years)  Substance Use: [x ] Never Used [ ] Used ____  EtOH Use: Rarely  Marital Status: [ ] Single [ ]  [ ]  [x ]   Sexual History: Unable to obtain  Occupation: Unable to obtain  Recent Travel: Unable to obtain  Country of Birth: Unable to obtain  Advance Directives: Unable to obtain    ALLERGIES: No Known Allergies    Intolerances    HOME MEDICATIONS:    metoprolol succinate 25 mg qd  diltiazem  mg qd  glipizide 10 mg qd  pioglitazone 30 mg qd  paroxetine 20 mg qd  sitagliptin 50 mg qd  oxycodone 5 mg PO q4 PRN    Xarelto 15 mg PO qd  HCTZ 25 mg qd    REVIEW OF SYSTEMS:    [x ] Unable to assess ROS because patient with altered mental status    OBJECTIVE:  ICU Vital Signs Last 24 Hrs  T(C): 36.8 (2019 23:45), Max: 36.8 (2019 20:45)  T(F): 98.2 (2019 23:45), Max: 98.3 (2019 20:45)  HR: 114 (2019 02:16) (112 - 163)  BP: 175/84 (2019 02:16) (159/81 - 193/110)  BP(mean): --  ABP: --  ABP(mean): --  RR: 16 (2019 02:16) (15 - 20)  SpO2: 97% (2019 02:16) (95% - 100%)    CAPILLARY BLOOD GLUCOSE    POCT Blood Glucose.: 228 mg/dL (2019 00:33)      PHYSICAL EXAM:  GENERAL: AOx0, unresponsive to name, appears stated age   HEENT: No pupillary reflex to light bilaterally, no scleral icterus  NECK: supple, trachea midline  CARDIAC: S1 & S2 auscultated, irregularly irregular, tachycardic  CHEST: breath sounds equal B/L, no increased WOB   ABDOMEN: ND, bowel sounds present, soft, NTTP, no organomegaly   NEURO: Left gaze deviation, right nasolabial flattening, no spontaneous movement of right UE and right LE, withdraws to painful stimuli   MSK: No joint deformity, no joint swelling  SKIN: Warm and dry, no rash, linear vertical scar over right hip   PSYCH: Unable to assess   EXT: R>L non-pitting LE edema, pedal pulses 2+ and equal B/L, capillary refill <2 s    LINES:     HOSPITAL MEDICATIONS:    diltiazem Infusion 5 mG/Hr IV Continuous <Continuous>  insulin regular Infusion 3 Unit(s)/Hr IV Continuous <Continuous>  dextrose 5% + sodium chloride 0.45% with potassium chloride 20 mEq/L 1000 milliLiter(s) IV Continuous <Continuous>      LABS:                        17.4   11.16 )-----------( 419      ( 2019 20:15 )             55.6     Hgb Trend: 17.4<--  06-08    143  |  102  |  17  ----------------------------<  384<H>  3.7   |  18<L>  |  0.95    Ca    9.0      2019 21:35    TPro  8.8<H>  /  Alb  3.9  /  TBili  1.3<H>  /  DBili  x   /  AST  72<H>  /  ALT  28  /  AlkPhos  436<H>      Creatinine Trend: 0.95<--, 0.90<--, 0.93<--, 1.03<--  PT/INR - ( 2019 21:56 )   PT: 14.5 SEC;   INR: 1.26          PTT - ( 2019 21:56 )  PTT:28.7 SEC  Urinalysis Basic - ( 2019 22:50 )    Color: YELLOW / Appearance: CLEAR / S.020 / pH: 6.5  Gluc: >1000 / Ketone: MODERATE  / Bili: NEGATIVE / Urobili: NORMAL   Blood: MODERATE / Protein: >600 / Nitrite: NEGATIVE   Leuk Esterase: NEGATIVE / RBC: 11-25 / WBC 0-2   Sq Epi: FEW / Non Sq Epi: x / Bacteria: NEGATIVE      Venous Blood Gas:   @ 20:15  7.38/45/58/25/86.5  VBG Lactate: 6.0      MICROBIOLOGY:     RADIOLOGY:  [x ] Reviewed and interpreted by me    EKG: Afib with RVR Cecelia Mcgrath PGY3  MICU Resident  Pager [phone]    CHIEF COMPLAINT:    HPI:    89 year old woman with Afib on Xarelto, HTN, HLD, uncontrolled T2DM (A1c 11.6 2019) and recent admission for right hip fracture s/p nail fixation BIBEMS for unresponsiveness at home. Patient found down in apartment, incontinent of urine with blood vs. blood-tinged vomitus around mouth by EMS sent by Physical Therapist after patient did not respond to multiple calls today. Collateral history obtained from daughter at bedside who last spoke with patient the night prior. Patient complained only of uncontrolled right hip pain. Patient resides alone and previously capable of ADLs independently. Now dependent on walker on wheels for ambulation and refused home services/home health aide after discharge from [facility] approx. 2 weeks ago. With exception of opioids, daughter believes patient has not been taking her medications as prescribed or eating adequately since returning home. On chart review, patient was discharge on Lantus 22U qhs and Humalog 8 U TID with meals however daughter reports patient was never agreeable to insulin therapy. Medications at bedside brought by EMS include metoprolol succinate, diltiazem ER, glipizide, pioglitazone, paroxetine, sitagliptin and oxycodone. Daughter reports patient would not want aggressive measures to prolong life, for example, chest compressions or defibrillation, if patient was unable to function independently with preserved quality of life however daughter is unable to make decisions/fill out MOLST form at this time.     PAST MEDICAL & SURGICAL HISTORY:  Atrial fibrillation, unspecified type  HLD (hyperlipidemia)  DM2 (diabetes mellitus, type 2)  HTN (hypertension)  Arthritis involving multiple sites  S/P hip replacement      FAMILY HISTORY:  No pertinent family history in first degree relatives      SOCIAL HISTORY:  Smoking: [x ] Never Smoked [ ] Former Smoker (__ packs x ___ years) [ ] Current Smoker  (__ packs x ___ years)  Substance Use: [x ] Never Used [ ] Used ____  EtOH Use: Rarely  Marital Status: [ ] Single [ ]  [ ]  [x ]   Sexual History: Unable to obtain  Occupation: Unable to obtain  Recent Travel: Unable to obtain  Country of Birth: Unable to obtain  Advance Directives: Unable to obtain    ALLERGIES: No Known Allergies    Intolerances    HOME MEDICATIONS:    metoprolol succinate 25 mg qd  diltiazem  mg qd  glipizide 10 mg qd  pioglitazone 30 mg qd  paroxetine 20 mg qd  sitagliptin 50 mg qd  oxycodone 5 mg PO q4 PRN    Xarelto 15 mg PO qd  HCTZ 25 mg qd    REVIEW OF SYSTEMS:    [x ] Unable to assess ROS because patient with altered mental status    OBJECTIVE:  ICU Vital Signs Last 24 Hrs  T(C): 36.8 (2019 23:45), Max: 36.8 (2019 20:45)  T(F): 98.2 (2019 23:45), Max: 98.3 (2019 20:45)  HR: 114 (2019 02:16) (112 - 163)  BP: 175/84 (2019 02:16) (159/81 - 193/110)  BP(mean): --  ABP: --  ABP(mean): --  RR: 16 (2019 02:16) (15 - 20)  SpO2: 97% (2019 02:16) (95% - 100%)    CAPILLARY BLOOD GLUCOSE    POCT Blood Glucose.: 228 mg/dL (2019 00:33)      PHYSICAL EXAM:  GENERAL: AOx0, unresponsive to name, appears stated age   HEENT: No pupillary reflex to light bilaterally, no scleral icterus  NECK: supple, trachea midline  CARDIAC: S1 & S2 auscultated, irregularly irregular, tachycardic  CHEST: breath sounds equal B/L, no increased WOB   ABDOMEN: ND, bowel sounds present, soft, NTTP, no organomegaly   NEURO: Left gaze deviation, right nasolabial flattening, no spontaneous movement of right UE and right LE, withdraws to painful stimuli   MSK: No joint deformity, no joint swelling  SKIN: Warm and dry, no rash, linear vertical scar over right hip   PSYCH: Unable to assess   EXT: R>L non-pitting LE edema, pedal pulses 2+ and equal B/L, capillary refill <2 s    LINES:     HOSPITAL MEDICATIONS:    diltiazem Infusion 5 mG/Hr IV Continuous <Continuous>  insulin regular Infusion 3 Unit(s)/Hr IV Continuous <Continuous>  dextrose 5% + sodium chloride 0.45% with potassium chloride 20 mEq/L 1000 milliLiter(s) IV Continuous <Continuous>      LABS:                        17.4   11.16 )-----------( 419      ( 2019 20:15 )             55.6     Hgb Trend: 17.4<--      143  |  102  |  17  ----------------------------<  384<H>  3.7   |  18<L>  |  0.95    Ca    9.0      2019 21:35    TPro  8.8<H>  /  Alb  3.9  /  TBili  1.3<H>  /  DBili  x   /  AST  72<H>  /  ALT  28  /  AlkPhos  436<H>      Creatinine Trend: 0.95<--, 0.90<--, 0.93<--, 1.03<--  PT/INR - ( 2019 21:56 )   PT: 14.5 SEC;   INR: 1.26          PTT - ( 2019 21:56 )  PTT:28.7 SEC  Urinalysis Basic - ( 2019 22:50 )    Color: YELLOW / Appearance: CLEAR / S.020 / pH: 6.5  Gluc: >1000 / Ketone: MODERATE  / Bili: NEGATIVE / Urobili: NORMAL   Blood: MODERATE / Protein: >600 / Nitrite: NEGATIVE   Leuk Esterase: NEGATIVE / RBC: 11-25 / WBC 0-2   Sq Epi: FEW / Non Sq Epi: x / Bacteria: NEGATIVE      Venous Blood Gas:   @ 20:15  7.38/45/58/25/86.5  VBG Lactate: 6.0      MICROBIOLOGY:     RADIOLOGY:  [x ] Reviewed and interpreted by me    EKG: Afib with RVR

## 2019-06-09 NOTE — H&P ADULT - PROBLEM SELECTOR PLAN 4
- patient found in blood tinged vomitus   - possibly from tongue biting after seizure event in the setting of acute ICH  - no further episodes and blood counts stable

## 2019-06-09 NOTE — H&P ADULT - NSHPPHYSICALEXAM_GEN_ALL_CORE
Vital Signs Last 24 Hrs  T(C): 37 (09 Jun 2019 03:21), Max: 37 (09 Jun 2019 03:21)  T(F): 98.6 (09 Jun 2019 03:21), Max: 98.6 (09 Jun 2019 03:21)  HR: 118 (09 Jun 2019 03:21) (112 - 163)  BP: 176/100 (09 Jun 2019 03:21) (159/81 - 193/110)  BP(mean): --  RR: 18 (09 Jun 2019 03:21) (15 - 20)  SpO2: 98% (09 Jun 2019 03:21) (95% - 100%)    PHYSICAL EXAM:    GENERAL: AAOx0, intermittently moving L arm and breathing audibly   HEAD:  Normocephalic, atraumatic  EYES: eyes deviated to the left, R pupil minimally reactive but L pupil mildly reactive to L  HEENT: dried blood on lips, no lacerations noted   NECK: Supple, No JVD  NERVOUS SYSTEM: intermittently moving L arm, L leg, and R leg independently but not moving R arm, R nasolabial fold flattening appreciated   CHEST/LUNG: coarse breath sounds bilaterally   HEART: irregularly irregular, no murmurs appreciated    ABDOMEN: Soft, Nontender, Nondistended, Bowel sounds present  EXTREMITIES:  No clubbing, cyanosis, or edema  MUSCULOSKELETAL: No muscle tenderness, no joint tenderness  SKIN: scattered ecchymoses on LE bilaterally Vital Signs Last 24 Hrs  T(C): 37 (09 Jun 2019 03:21), Max: 37 (09 Jun 2019 03:21)  T(F): 98.6 (09 Jun 2019 03:21), Max: 98.6 (09 Jun 2019 03:21)  HR: 118 (09 Jun 2019 03:21) (112 - 163)  BP: 176/100 (09 Jun 2019 03:21) (159/81 - 193/110)  BP(mean): --  RR: 18 (09 Jun 2019 03:21) (15 - 20)  SpO2: 98% (09 Jun 2019 03:21) (95% - 100%)    PHYSICAL EXAM:    GENERAL: AAOx0, intermittently moving L arm  HEAD:  Normocephalic, atraumatic  EYES: eyes deviated to the left, R pupil minimally reactive but L pupil mildly reactive to L  HEENT: dried blood on lips, no lacerations noted   NECK: Supple, No JVD  NERVOUS SYSTEM: intermittently moving L arm, L leg, and R leg independently but not moving R arm, R nasolabial fold flattening appreciated   CHEST/LUNG: coarse breath sounds bilaterally with labored mouth breathing, using abdominal muscles   HEART: irregularly irregular, no murmurs appreciated    ABDOMEN: Soft, Nontender, Nondistended, Bowel sounds present  EXTREMITIES:  No clubbing, cyanosis, or edema  MUSCULOSKELETAL: No muscle tenderness, no joint tenderness  SKIN: scattered ecchymoses on LE bilaterally

## 2019-06-09 NOTE — CONSULT NOTE ADULT - PROBLEM SELECTOR RECOMMENDATION 9
-patient with history of non-adherence admitted for large infarct.  The patient is currently unresponsive, conversations regarding goals of care were had  -would not aim for tight glycemic control at this time. Glucose goals of 100-mid 200's  -start Lantus 8 units at bedtime  -currently not eating, so low correction sliding scale every six hours  -if goals of care are to optimize comfort can consider decreasing frequency of fingersticks and insulin

## 2019-06-09 NOTE — PROGRESS NOTE ADULT - ASSESSMENT
89 year old woman with Afib on Xarelto, HTN, HLD, uncontrolled T2DM  and recent right hip fracture s/p nail fixation now minimally responsive with acute ischemic strokes in left MCA and PCA distribution likely cardioembolic etiology in setting of Afib with RVR, complicated by hyperglycemia likely secondary to medication non compliance.

## 2019-06-09 NOTE — PROGRESS NOTE ADULT - PROBLEM SELECTOR PLAN 8
- after extensive discussion with daughter, she voices that her mother would not have wanted aggressive life support as she was previously very independent   - confirmed DNR/DNI/no feeding tube at this time, will fill out MOLST with verbal signature and place order in computer   - palliative for assistance in managing symptoms at this time  - continue supportive, comfort directed measures including oxygen treatments and pain medications - Holding all chemical AC given acute CVA  - NPO  - Possible dispo to hospice care

## 2019-06-09 NOTE — H&P ADULT - PROBLEM SELECTOR PLAN 5
- patient initially with lactate of 6 which has subsequently downtrended to 3.5  - etiology DKA versus seizure event precipitated by ICH with DKA less likely given lack of acidosis on VBG  - continue IVF

## 2019-06-09 NOTE — PROGRESS NOTE ADULT - PROBLEM SELECTOR PLAN 6
- A1c 11 in April 2019, refused to take insulin  - NPO so fingersticks q6 for now with sliding scale  - will have day team discuss need for further finger sticks and insulin sliding scale given focus on comfort - permissive hypertension given acute stroke  - continue cardizem drip for now with goal 220/120

## 2019-06-09 NOTE — CONSULT NOTE ADULT - ATTENDING COMMENTS
CVA in the setting AFib with RVR, and medication noncompliance   Hyperglycemia, not in DKA given normal pH  woulds give IVF, insulin sq, low dose lantus  neuro  tele   GOC

## 2019-06-10 NOTE — GOALS OF CARE CONVERSATION - PERSONAL ADVANCE DIRECTIVE - CONVERSATION DETAILS
[facility]   Met with patient's daughter  and obtained approval and consents for inpatient care at the [facility] .   Bed will be available for this patient after 11 am on 6/11/19.
Discussed patient's ailments and general prognosis, which the family understood is grim. Both son and daughter (separately) confirmed that the patient would not want to be sustained alive by artifical means. Opt for DNR/DNI. Do not want feeding tube of any kind. Will discuss full comfort care options later in the day.  MOLST filled. Will be updated if full comfort measures are chosen.    Veronica Christopher MD  [facility] PGY-2  Pager [phone]

## 2019-06-10 NOTE — PROVIDER CONTACT NOTE (OTHER) - ACTION/TREATMENT ORDERED:
No orders at this time. Will let day team know.
d/c diltiazem infusion.  Recheck HR in 30 mins and BP in one hour.

## 2019-06-10 NOTE — PROGRESS NOTE ADULT - PROBLEM SELECTOR PLAN 1
Large acute L sided PCA/MCA infarcts with effacement on CT resulting in R sided paralysis, fixed gaze, and encephalopathy. Worse on repeat CTH   - permissive hypertension to 220/120 x24hrs  - NPO pending dysphagia screen if able to participate given mental status   - patient now with labored breathing and tachypnea with concern for aspiration  - per discussion with patient's daughter, patient would not have wanted aggressive interventions --> DNR/DNI, no PEG.  -Hospice consult Large acute L sided PCA/MCA infarcts with effacement on CT resulting in R sided paralysis, fixed gaze, and encephalopathy. Worse on repeat CTH   - per discussion with patient's daughter, patient would not have wanted aggressive interventions --> DNR/DNI, no PEG.  - comfort measures.  - d/c tele monitor.  -Hospice consult

## 2019-06-10 NOTE — PROGRESS NOTE ADULT - PROBLEM SELECTOR PLAN 1
- Patient with agonal respiration with goals of comfort care, palliative care consulted and consideration for hospice underway  - Goals of FS is liberal if hospice is planned and agree with stopping FS and Lantus  - Patient received Lantus 8 units yesterday night and FS>200 so risk of hypoglycemia is low despite of NPO status  - If plan is pursue hospice care, our team will sign off. Continue comfort care measures without fingerstick checks and insulin  - Will discuss with primary team, team has been paged.    Honorio Morfin DO  Pager 9AM-5PM: [phone]  Pager Nights and Weekends: [phone]

## 2019-06-10 NOTE — CONSULT NOTE ADULT - PROBLEM SELECTOR RECOMMENDATION 4
Daughter and son per conversation with primary team wanted mother to be a DNR/DNI and have no feeding tube. GUTIERREZ is in chart. As per daughter she doesn not want her mother to suffer. Hospice referral made. She is on board for hospice for her mom. Emotional support provided.

## 2019-06-10 NOTE — PROGRESS NOTE ADULT - PROBLEM SELECTOR PLAN 6
- permissive hypertension given acute stroke  - continue cardizem drip for now with goal 220/120 - no active management at this time.

## 2019-06-10 NOTE — CONSULT NOTE ADULT - PROBLEM SELECTOR RECOMMENDATION 3
-statin if no other contraindications    Patient with poor prognosis, endocrine will continue to follow
Patient with evolving CVA, daughter would like to concentrate on comfort and not have her mother suffer.

## 2019-06-10 NOTE — PROGRESS NOTE ADULT - SUBJECTIVE AND OBJECTIVE BOX
Chief Complaint/Follow-up on: T2DM    Subjective: 89 year old female with large Left sided evolving infarcts    Previous History: Patient with agonal breathing on oxygen via face mask. Daughter is at bedside provides some collateral history. Patient has long-standing history of diabetes was on oral medicines prior to her hospitalization in April for a hip fractures. States there was likely non-adherence at home with diabetes medicines. The patient was not checking fingersticks, meals were not tightly regulated.  She was initiated on basal/bolus insulin during the last admission and per daughter was receiving insulin doses at rehab. She was discharged from rehab about 3 weeks ago after which point she was non-adherent at home.    MEDICATIONS  (STANDING):  ALBUTerol/ipratropium for Nebulization 3 milliLiter(s) Nebulizer every 6 hours  diltiazem Infusion 20 mG/Hr (20 mL/Hr) IV Continuous <Continuous>  morphine  - Injectable 1 milliGRAM(s) IV Push every 4 hours  morphine  Infusion 0.2 mG/Hr (0.2 mL/Hr) IV Continuous <Continuous>    MEDICATIONS  (PRN):  morphine  - Injectable 1 milliGRAM(s) IV Push every 2 hours PRN dyspnea, trouble breathing, discomfort      PHYSICAL EXAM:  VITALS: T(C): 37.3 (06-10-19 @ 09:08)  T(F): 99.2 (06-10-19 @ 09:08), Max: 99.3 (06-09-19 @ 17:23)  HR: 108 (06-10-19 @ 10:13) (102 - 129)  BP: 151/79 (06-10-19 @ 09:08) (141/79 - 160/79)  RR:  (18 - 26)  SpO2:  (96% - 100%)  Wt(kg): 57.8 kg    GENERAL: NAD, well-groomed, well-developed  EYES: No proptosis, no injection  HEENT:  Atraumatic, Normocephalic, moist mucous membranes  THYROID: Normal size, no palpable nodules  RESPIRATORY: Clear to auscultation bilaterally; No rales, rhonchi, wheezing, or rubs  CARDIOVASCULAR: Regular rate and rhythm; No murmurs; no peripheral edema  GI: Soft, nontender, non distended, normal bowel sounds  CUSHING'S SIGNS: no striae    POCT Blood Glucose.: 262 mg/dL (06-10-19 @ 06:05) H3  POCT Blood Glucose.: 257 mg/dL (06-10-19 @ 00:34) H3    POCT Blood Glucose.: 262 mg/dL (06-09-19 @ 21:25) L8  POCT Blood Glucose.: 288 mg/dL (06-09-19 @ 18:28) H3  POCT Blood Glucose.: 258 mg/dL (06-09-19 @ 12:56)  POCT Blood Glucose.: 304 mg/dL (06-09-19 @ 06:15)  POCT Blood Glucose.: 228 mg/dL (06-09-19 @ 00:33)    POCT Blood Glucose.: 250 mg/dL (06-08-19 @ 23:23)  POCT Blood Glucose.: 317 mg/dL (06-08-19 @ 22:21)  POCT Blood Glucose.: 324 mg/dL (06-08-19 @ 21:15)  POCT Blood Glucose.: 422 mg/dL (06-08-19 @ 20:23)    06-09    142  |  102  |  15  ----------------------------<  360<H>  3.6   |  22  |  0.81    AG 18 (Ag was 24 on admission)  pH was 7.38 on admission and repeat 7.48    Beta Hydroxy-Butyrate (06.09.19 @ 06:06)    Beta Hydroxy-Butyrate: 1.2 mmol/L (BHB was 3.2 on admission)    Lactate was 6.0 on admission and on repeat 3.2    EGFR if : 75  EGFR if non : 64    Ca    9.6      06-09  Mg     1.4     06-09  Phos  2.4     06-09    TPro  8.8<H>  /  Alb  3.9  /  TBili  1.3<H>  /  DBili  x   /  AST  72<H>  /  ALT  28  /  AlkPhos  436<H>  06-08    Hemoglobin A1C, Whole Blood: 11.6 % <H> [4.0 - 5.6] (04-28-19 @ 06:20)    < from: CT Head No Cont (06.09.19 @ 18:02) >    EXAM:  CT BRAIN        PROCEDURE DATE:  Jun 9 2019         INTERPRETATION:  Clinical indication: Code stroke. Follow-up head CT.    Multiple axial sections were performed base skull to vertex without   contrast enhancement. Coronal and sagittal reconstructions were performed   as well    This exam somewhat limited by motion    Abnormal areas of low-attenuation involving the left temporal frontal   parietal and occipital cortical and subcortical region. These areas of   low-attenuation is more conspicuous when compared with the prior exam and   are compatible with a subacute left MCA and PCA infarct. These infarcts   have demonstrated expected evolutionary changes when compared with the   prior exam. No hemorrhagic transformation is seen this time. There is   localized mass effect consisting sulcal effacement. No significant shift   or herniation seen.    Evaluation of the osseous structures with appropriate window appears   unremarkable    The visualized paranasal sinuses mastoid and middle ear regions appear   clear.    Impression: Evolving infarcts as described above      EDWARDO ROMANO M.D., ATTENDING RADIOLOGIST  This document has been electronically signed. Yair 10 2019  8:14AM     < end of copied text > Chief Complaint/Follow-up on: T2DM    Subjective: 89 year old female with large Left sided evolving infarcts. No family at bedside. Patient unresponsive to touch or name. Agonal breathing with venti mask. Lantus and Humalog discontinued. Currently on cardizem and morphine drip.    Previous History: Patient with agonal breathing on oxygen via face mask. Daughter is at bedside provides some collateral history. Patient has long-standing history of diabetes was on oral medicines prior to her hospitalization in April for a hip fractures. States there was likely non-adherence at home with diabetes medicines. The patient was not checking fingersticks, meals were not tightly regulated.  She was initiated on basal/bolus insulin during the last admission and per daughter was receiving insulin doses at rehab. She was discharged from rehab about 3 weeks ago after which point she was non-adherent at home.    MEDICATIONS  (STANDING):  ALBUTerol/ipratropium for Nebulization 3 milliLiter(s) Nebulizer every 6 hours  diltiazem Infusion 20 mG/Hr (20 mL/Hr) IV Continuous <Continuous>  morphine  - Injectable 1 milliGRAM(s) IV Push every 4 hours  morphine  Infusion 0.2 mG/Hr (0.2 mL/Hr) IV Continuous <Continuous>    MEDICATIONS  (PRN):  morphine  - Injectable 1 milliGRAM(s) IV Push every 2 hours PRN dyspnea, trouble breathing, discomfort      PHYSICAL EXAM:  VITALS: T(C): 37.3 (06-10-19 @ 09:08)  T(F): 99.2 (06-10-19 @ 09:08), Max: 99.3 (06-09-19 @ 17:23)  HR: 108 (06-10-19 @ 10:13) (102 - 129)  BP: 151/79 (06-10-19 @ 09:08) (141/79 - 160/79)  RR:  (18 - 26)  SpO2:  (96% - 100%)  Wt(kg): 57.8 kg    GENERAL: Agonal breathing, unresponsive to verbal or noxious stimuli  HEENT:  Atraumatic, Normocephalic, dry mucous membranes, + venti mask  THYROID: No goiter  RESPIRATORY: Agonal breathing, tachypneic, bibasilar wheezing  CARDIOVASCULAR: Tachycardia with irregularly irregular rhythm  GI: Soft, nontender, non distended, hypoactive bowel sounds  CUSHING'S SIGNS: no striae    POCT Blood Glucose.: 262 mg/dL (06-10-19 @ 06:05) H3  POCT Blood Glucose.: 257 mg/dL (06-10-19 @ 00:34) H3    POCT Blood Glucose.: 262 mg/dL (06-09-19 @ 21:25) L8  POCT Blood Glucose.: 288 mg/dL (06-09-19 @ 18:28) H3  POCT Blood Glucose.: 258 mg/dL (06-09-19 @ 12:56)  POCT Blood Glucose.: 304 mg/dL (06-09-19 @ 06:15)  POCT Blood Glucose.: 228 mg/dL (06-09-19 @ 00:33)    POCT Blood Glucose.: 250 mg/dL (06-08-19 @ 23:23)  POCT Blood Glucose.: 317 mg/dL (06-08-19 @ 22:21)  POCT Blood Glucose.: 324 mg/dL (06-08-19 @ 21:15)  POCT Blood Glucose.: 422 mg/dL (06-08-19 @ 20:23)    06-09    142  |  102  |  15  ----------------------------<  360<H>  3.6   |  22  |  0.81    AG 18 (Ag was 24 on admission)  pH was 7.38 on admission and repeat 7.48    Beta Hydroxy-Butyrate (06.09.19 @ 06:06)    Beta Hydroxy-Butyrate: 1.2 mmol/L (BHB was 3.2 on admission)    Lactate was 6.0 on admission and on repeat 3.2    EGFR if : 75  EGFR if non : 64    Ca    9.6      06-09  Mg     1.4     06-09  Phos  2.4     06-09    TPro  8.8<H>  /  Alb  3.9  /  TBili  1.3<H>  /  DBili  x   /  AST  72<H>  /  ALT  28  /  AlkPhos  436<H>  06-08    Hemoglobin A1C, Whole Blood: 11.6 % <H> [4.0 - 5.6] (04-28-19 @ 06:20)    < from: CT Head No Cont (06.09.19 @ 18:02) >    EXAM:  CT BRAIN        PROCEDURE DATE:  Jun 9 2019         INTERPRETATION:  Clinical indication: Code stroke. Follow-up head CT.    Multiple axial sections were performed base skull to vertex without   contrast enhancement. Coronal and sagittal reconstructions were performed   as well    This exam somewhat limited by motion    Abnormal areas of low-attenuation involving the left temporal frontal   parietal and occipital cortical and subcortical region. These areas of   low-attenuation is more conspicuous when compared with the prior exam and   are compatible with a subacute left MCA and PCA infarct. These infarcts   have demonstrated expected evolutionary changes when compared with the   prior exam. No hemorrhagic transformation is seen this time. There is   localized mass effect consisting sulcal effacement. No significant shift   or herniation seen.    Evaluation of the osseous structures with appropriate window appears   unremarkable    The visualized paranasal sinuses mastoid and middle ear regions appear   clear.    Impression: Evolving infarcts as described above      EDWARDO ROMANO M.D., ATTENDING RADIOLOGIST  This document has been electronically signed. Yair 10 2019  8:14AM     < end of copied text >

## 2019-06-10 NOTE — PROGRESS NOTE ADULT - PROBLEM SELECTOR PLAN 3
- A1c 11 in April 2019, refused to take insulin. Presented with AGAP and +BHB.  - NPO   - FSG Q6H SSI  - Endo consult for continued AGAP and ketones in spite of no acidosis  -Glargine 8u qhs - A1c 11 in April 2019, refused to take insulin. Presented with AGAP and +BHB.  - NPO   - d/c NISS, FS QID as per comfort measures.

## 2019-06-10 NOTE — PROVIDER CONTACT NOTE (OTHER) - SITUATION
Patient daughter refused lab work; wants to speak to a provider in regards to her mother's care. Patient HR in the 130's, do you need to increase Cardizem infusion?
Mews score of 7 triggered and oxygen saturation at 92% on supplemental o2.

## 2019-06-10 NOTE — PROGRESS NOTE ADULT - PROBLEM SELECTOR PLAN 8
- Holding all chemical AC given acute CVA  - NPO  - Possible dispo to hospice care - Holding all chemical AC given acute CVA  - NPO  - dispo to hospice care

## 2019-06-10 NOTE — PROGRESS NOTE ADULT - ASSESSMENT
Patient is an 88 yo woman with uncontrolled T2DM A1c of 11.6%, HTN, HLD, Afib, osteoporosis with recent hip fracture admitted for large MCA/PCA infarct.  (high medical decision making)

## 2019-06-10 NOTE — CONSULT NOTE ADULT - ATTENDING COMMENTS
Pt seen with NP.  Agree with above.  Start morphine drip at 0.5mg/hr with prn.  Goal is to optimize comfort.  No blood draws.  Hospice inpatient eval.

## 2019-06-10 NOTE — PROGRESS NOTE ADULT - SUBJECTIVE AND OBJECTIVE BOX
Dr. Eduard Art   Internal Medicine PGY-1  Pager [phone] ([facility])/[phone] ([facility])    Patient is a 89y old  Female who presents with a chief complaint of Stroke (2019 13:46)      SUBJECTIVE / OVERNIGHT EVENTS:  Increasing HR this AM. R sided paralysis. AAOx0. Moving L side body only. No cp, sob, or abdominal pain.     MEDICATIONS  (STANDING):  ALBUTerol/ipratropium for Nebulization 3 milliLiter(s) Nebulizer every 6 hours  dextrose 5%. 1000 milliLiter(s) (50 mL/Hr) IV Continuous <Continuous>  dextrose 50% Injectable 12.5 Gram(s) IV Push once  dextrose 50% Injectable 25 Gram(s) IV Push once  dextrose 50% Injectable 25 Gram(s) IV Push once  diltiazem Infusion 20 mG/Hr (20 mL/Hr) IV Continuous <Continuous>  insulin glargine Injectable (LANTUS) 8 Unit(s) SubCutaneous at bedtime  insulin lispro (HumaLOG) corrective regimen sliding scale   SubCutaneous every 6 hours  morphine  - Injectable 1 milliGRAM(s) IV Push every 4 hours    MEDICATIONS  (PRN):  dextrose 40% Gel 15 Gram(s) Oral once PRN Blood Glucose LESS THAN 70 milliGRAM(s)/deciliter  glucagon  Injectable 1 milliGRAM(s) IntraMuscular once PRN Glucose LESS THAN 70 milligrams/deciliter  morphine  - Injectable 1 milliGRAM(s) IV Push every 2 hours PRN dyspnea, trouble breathing, discomfort      Vital Signs Last 24 Hrs  T(C): 36.8 (10 Yair 2019 04:49), Max: 37.4 (2019 17:23)  T(F): 98.2 (10 Yair 2019 04:49), Max: 99.3 (2019 17:23)  HR: 107 (10 Yair 2019 05:04) (104 - 129)  BP: 155/93 (10 Yair 2019 04:49) (141/79 - 160/79)  BP(mean): --  RR: 24 (10 Yair 2019 04:49) (18 - 27)  SpO2: 97% (10 Yair 2019 05:04) (96% - 100%)  CAPILLARY BLOOD GLUCOSE      POCT Blood Glucose.: 262 mg/dL (10 Yair 2019 06:05)  POCT Blood Glucose.: 257 mg/dL (10 Yair 2019 00:34)  POCT Blood Glucose.: 262 mg/dL (2019 21:25)  POCT Blood Glucose.: 288 mg/dL (2019 18:28)  POCT Blood Glucose.: 258 mg/dL (2019 12:56)    I&O's Summary    2019 07:01  -  10 Yair 2019 07:00  --------------------------------------------------------  IN: 720 mL / OUT: 375 mL / NET: 345 mL        PHYSICAL EXAM:  GENERAL: NAD, well-developed  HEAD:  Atraumatic, Normocephalic  EYES: EOMI, PERRLA, conjunctiva and sclera clear  NECK: Supple, No JVD  CHEST/LUNG: Clear to auscultation bilaterally; No wheeze  HEART: Regular rate and rhythm; No murmurs, rubs, or gallops  ABDOMEN: Soft, Nontender, Nondistended; Bowel sounds present  EXTREMITIES:  2+ Peripheral Pulses, No clubbing, cyanosis, or edema  PSYCH: AAOx0  NEUROLOGY: non-focal  SKIN: No rashes or lesions    LABS:                        15.0   17.54 )-----------( 378      ( 2019 06:06 )             48.2     06-09    142  |  102  |  15  ----------------------------<  360<H>  3.6   |  22  |  0.81    Ca    9.6      2019 06:06  Phos  2.4     06-09  Mg     1.4     06-09    TPro  8.8<H>  /  Alb  3.9  /  TBili  1.3<H>  /  DBili  x   /  AST  72<H>  /  ALT  28  /  AlkPhos  436<H>  06-08    PT/INR - ( 2019 06:06 )   PT: 13.5 SEC;   INR: 1.21          PTT - ( 2019 21:56 )  PTT:28.7 SEC      Urinalysis Basic - ( 2019 22:50 )    Color: YELLOW / Appearance: CLEAR / S.020 / pH: 6.5  Gluc: >1000 / Ketone: MODERATE  / Bili: NEGATIVE / Urobili: NORMAL   Blood: MODERATE / Protein: >600 / Nitrite: NEGATIVE   Leuk Esterase: NEGATIVE / RBC: 11-25 / WBC 0-2   Sq Epi: FEW / Non Sq Epi: x / Bacteria: NEGATIVE        RADIOLOGY & ADDITIONAL TESTS:    Imaging Personally Reviewed:    Consultant(s) Notes Reviewed:      Care Discussed with Consultants/Other Providers: Dr. Eduard Art   Internal Medicine PGY-1  Pager [phone] ([facility])/[phone] ([facility])    Patient is a 89y old  Female who presents with a chief complaint of Stroke (2019 13:46)      SUBJECTIVE / OVERNIGHT EVENTS:  Increasing HR this AM. R sided paralysis. AAOx0. Moving L side body only. No cp, abdominal pain.   Significant tachypnea and tachycardia.    MEDICATIONS  (STANDING):  ALBUTerol/ipratropium for Nebulization 3 milliLiter(s) Nebulizer every 6 hours  dextrose 5%. 1000 milliLiter(s) (50 mL/Hr) IV Continuous <Continuous>  dextrose 50% Injectable 12.5 Gram(s) IV Push once  dextrose 50% Injectable 25 Gram(s) IV Push once  dextrose 50% Injectable 25 Gram(s) IV Push once  diltiazem Infusion 20 mG/Hr (20 mL/Hr) IV Continuous <Continuous>  insulin glargine Injectable (LANTUS) 8 Unit(s) SubCutaneous at bedtime  insulin lispro (HumaLOG) corrective regimen sliding scale   SubCutaneous every 6 hours  morphine  - Injectable 1 milliGRAM(s) IV Push every 4 hours    MEDICATIONS  (PRN):  dextrose 40% Gel 15 Gram(s) Oral once PRN Blood Glucose LESS THAN 70 milliGRAM(s)/deciliter  glucagon  Injectable 1 milliGRAM(s) IntraMuscular once PRN Glucose LESS THAN 70 milligrams/deciliter  morphine  - Injectable 1 milliGRAM(s) IV Push every 2 hours PRN dyspnea, trouble breathing, discomfort      Vital Signs Last 24 Hrs  T(C): 36.8 (10 Yair 2019 04:49), Max: 37.4 (2019 17:23)  T(F): 98.2 (10 Yair 2019 04:49), Max: 99.3 (2019 17:23)  HR: 107 (10 Yair 2019 05:04) (104 - 129)  BP: 155/93 (10 Yair 2019 04:49) (141/79 - 160/79)  BP(mean): --  RR: 24 (10 Yair 2019 04:49) (18 - 27)  SpO2: 97% (10 Yair 2019 05:04) (96% - 100%)  CAPILLARY BLOOD GLUCOSE      POCT Blood Glucose.: 262 mg/dL (10 Yair 2019 06:05)  POCT Blood Glucose.: 257 mg/dL (10 Yair 2019 00:34)  POCT Blood Glucose.: 262 mg/dL (2019 21:25)  POCT Blood Glucose.: 288 mg/dL (2019 18:28)  POCT Blood Glucose.: 258 mg/dL (2019 12:56)    I&O's Summary    2019 07:01  -  10 Yair 2019 07:00  --------------------------------------------------------  IN: 720 mL / OUT: 375 mL / NET: 345 mL        PHYSICAL EXAM:  GENERAL: In mild respiratory distress  HEAD:  Atraumatic, Normocephalic  EYES: EOMI, PERRLA, conjunctiva and sclera clear  NECK: Supple, No JVD  CHEST/LUNG: Clear to auscultation bilaterally; No wheeze  HEART: Tachycardia. No murmurs, rubs, or gallops  ABDOMEN: Soft, Nontender, Nondistended; Bowel sounds present  EXTREMITIES:  2+ Peripheral Pulses, No clubbing, cyanosis, or edema  PSYCH: AAOx0  NEUROLOGY: non-focal  SKIN: No rashes or lesions    LABS:                        15.0   17.54 )-----------( 378      ( 2019 06:06 )             48.2     06-09    142  |  102  |  15  ----------------------------<  360<H>  3.6   |  22  |  0.81    Ca    9.6      2019 06:06  Phos  2.4     06-09  Mg     1.4     06-09    TPro  8.8<H>  /  Alb  3.9  /  TBili  1.3<H>  /  DBili  x   /  AST  72<H>  /  ALT  28  /  AlkPhos  436<H>  06-08    PT/INR - ( 2019 06:06 )   PT: 13.5 SEC;   INR: 1.21          PTT - ( 2019 21:56 )  PTT:28.7 SEC      Urinalysis Basic - ( 2019 22:50 )    Color: YELLOW / Appearance: CLEAR / S.020 / pH: 6.5  Gluc: >1000 / Ketone: MODERATE  / Bili: NEGATIVE / Urobili: NORMAL   Blood: MODERATE / Protein: >600 / Nitrite: NEGATIVE   Leuk Esterase: NEGATIVE / RBC: 11-25 / WBC 0-2   Sq Epi: FEW / Non Sq Epi: x / Bacteria: NEGATIVE        RADIOLOGY & ADDITIONAL TESTS:    Imaging Personally Reviewed:    Consultant(s) Notes Reviewed:      Care Discussed with Consultants/Other Providers:

## 2019-06-10 NOTE — PROGRESS NOTE ADULT - PROBLEM SELECTOR PLAN 7
- after extensive discussion with daughter, she voices that her mother would not have wanted aggressive life support as she was previously very independent   - confirmed DNR/DNI/no feeding tube at this time  - MOLST filled out  - continue supportive, comfort directed measures including oxygen treatments and pain medications (morphine Q4H w/t Q2 PRN breakthrough tachypnea)

## 2019-06-10 NOTE — CHART NOTE - NSCHARTNOTEFT_GEN_A_CORE
case discussed with Dr.Libman. Agree with plan of hospice care as patient had poor prognosis due to large stroke.

## 2019-06-10 NOTE — CONSULT NOTE ADULT - SUBJECTIVE AND OBJECTIVE BOX
HPI:  89 year old woman with Afib on Xarelto, HTN, HLD, uncontrolled T2DM (A1c 11.6 2019) and recent admission for right hip fracture s/p nail fixation BIBEMS for unresponsiveness at home. Per daughter at bedside, the patient was found down in her apartment, incontinent of urine with blood vs. blood-tinged vomitus around mouth by EMS sent by Physical Therapist after patient did not respond to multiple calls today. The daughter spoke with the patient last yesterday evening. Patient complained only of uncontrolled right hip pain. Patient resides alone and previously capable of ADLs independently. Now dependent on walker on wheels for ambulation and refused home services/home health aide after discharge from [facility] about 2 weeks ago. With the exception of opioids, the daughter believes that her mother has not been taking her medications as prescribed or eating adequately since returning home. On chart review, patient was discharge on Lantus 22U qhs and Humalog 8 U TID with meals but the patient's daughter says that the patient did not want to take insulin.     The patient was a code stroke in the ED after CT head showed an acute L sided MCA/PCA stroke. She was also found to be in afib with RVR with FS in the 400s. She was initially started on a cardizem drip and insulin drip and MICU was consulted. She was not deemed a MICU candidate given her VBG did not show acidosis suggesting she was not in DKA and did not require an insulin drip. The patient's daughter at the bedside states that her father just passed away from a stroke 3 months ago. She had initiated a GOC with her mother but she did not want to participate. She feels her mother would not want aggressive interventions since she was previously independent but is not ready to make a decision at this time. (2019 05:46)    PERTINENT PM/SXH:   Atrial fibrillation, unspecified type  HLD (hyperlipidemia)  DM2 (diabetes mellitus, type 2)  HTN (hypertension)  Arthritis involving multiple sites    S/P hip replacement    FAMILY HISTORY:  No pertinent family history in first degree relatives      SOCIAL HISTORY:   Significant other/partner: Yes [ ]  No [ ] Children:  Yes [ ]  No [ ] Sabianism/Spirituality:  Substance hx: Yes[ ]  No [ ]   Tobacco hx:  Yes [ ] No [ ]   Alcohol hx: Yes [ ] No [ ]   Home Opioid hx:  [ ] I-Stop Reference No:  Living Situation: [ ]Home  [ ]Long term care  [ ]Rehab [ ]Other    ADVANCE DIRECTIVES:    DNR  Yes  MOLST  [ ]  Living Will  [ ]   DECISION MAKER(s):  [ ] Health Care Proxy(s)  [ ] Surrogate(s)  [ ] Guardian           Name(s): Phone Number(s):    BASELINE (I)ADL(s) (prior to admission):  [place]: [ ]Total  [ ] Moderate [ ]Dependent    Allergies    No Known Allergies    Intolerances    MEDICATIONS  (STANDING):  ALBUTerol/ipratropium for Nebulization 3 milliLiter(s) Nebulizer every 6 hours  dextrose 5%. 1000 milliLiter(s) (50 mL/Hr) IV Continuous <Continuous>  dextrose 50% Injectable 12.5 Gram(s) IV Push once  dextrose 50% Injectable 25 Gram(s) IV Push once  dextrose 50% Injectable 25 Gram(s) IV Push once  diltiazem Infusion 20 mG/Hr (20 mL/Hr) IV Continuous <Continuous>  insulin glargine Injectable (LANTUS) 8 Unit(s) SubCutaneous at bedtime  insulin lispro (HumaLOG) corrective regimen sliding scale   SubCutaneous every 6 hours  morphine  - Injectable 1 milliGRAM(s) IV Push every 4 hours    MEDICATIONS  (PRN):  dextrose 40% Gel 15 Gram(s) Oral once PRN Blood Glucose LESS THAN 70 milliGRAM(s)/deciliter  glucagon  Injectable 1 milliGRAM(s) IntraMuscular once PRN Glucose LESS THAN 70 milligrams/deciliter  morphine  - Injectable 1 milliGRAM(s) IV Push every 2 hours PRN dyspnea, trouble breathing, discomfort    PRESENT SYMPTOMS: [ ]Unable to obtain due to poor mentation   Source if other than patient:  [ ]Family   [ ]Team     Pain (Impact on QOL):    Location -   Severity -        Minimal acceptable level (0-10 scale):  Quality:   Onset:   Duration:                 Aggravating factors -  Relieving factors -  Radiation -    PAIN AD Score:     [email or web address] (press ctrl +  left click to view)    Dyspnea:  Yes [ ] No [ ] - [ ]Mild [ ]Moderate [ ]Severe  Anxiety:    Yes [ ] No [ ] - [ ]Mild [ ]Moderate [ ]Severe  Fatigue:    Yes [ ] No [ ] - [ ]Mild [ ]Moderate [ ]Severe  Nausea:    Yes [ ] No [ ] - [ ]Mild [ ]Moderate [ ]Severe                         Loss of appetite: Yes [ ] No [ ] - [ ]Mild [ ]Moderate [ ]Severe             Constipation:  Yes [ ] No [ ] - [ ]Mild [ ]Moderate [ ]Severe  Grief:  Yes [ ] No [ ]     Other Symptoms:  [ ]All other review of systems negative     Karnofsky Performance Score/Palliative Performance Status Version 2:         %    http://palliative.info/resource_material/[email or web address].pdf  PHYSICAL EXAM:  Vital Signs Last 24 Hrs  T(C): 37.3 (10 Yair 2019 09:08), Max: 37.4 (2019 17:23)  T(F): 99.2 (10 Yair 2019 09:08), Max: 99.3 (2019 17:23)  HR: 102 (10 Yair 2019 09:08) (102 - 129)  BP: 151/79 (10 Yair 2019 09:08) (141/79 - 160/79)  BP(mean): --  RR: 23 (10 Yair 2019 09:08) (18 - 26)  SpO2: 97% (10 Yair 2019 09:08) (96% - 100%) I&O's Summary    2019 07:01  -  10 Yair 2019 07:00  --------------------------------------------------------  IN: 740 mL / OUT: 375 mL / NET: 365 mL    GENERAL:  [ ]Alert  [ ]Oriented x   [ ]Lethargic  [ ]Cachexia  [ ]Unarousable  [ ]Verbal  [ ]Non-Verbal  Behavioral:   [ ] Anxiety  [ ] Delirium [ ] Agitation [ ] Other  HEENT:  [ ]Normal   [ ]Dry mouth   [ ]ET Tube/Trach  [ ]Oral lesions  PULMONARY:   [ ]Clear  [ ]Tachypnea  [ ]Audible excessive secretions   [ ]Rhonchi        [ ]Right [ ]Left [ ]Bilateral  [ ]Crackles        [ ]Right [ ]Left [ ]Bilateral  [ ]Wheezing     [ ]Right [ ]Left [ ]Bilateral  CARDIOVASCULAR:    [ ]Regular [ ]Irregular [ ]Tachy  [ ]Boo [ ]Murmur [ ]Other  GASTROINTESTINAL:  [ ]Soft  [ ]Distended   [ ]+BS  [ ]Non tender [ ]Tender  [ ]PEG [ ]OGT/ NGT  Last BM:     GENITOURINARY:  [ ]Normal [ ] Incontinent   [ ]Oliguria/Anuria   [ ][place]  MUSCULOSKELETAL:   [ ]Normal   [ ]Weakness  [ ]Bed/Wheelchair bound [ ]Edema  NEUROLOGIC:   [ ]No focal deficits  [ ] Cognitive impairment  [ ] Dysphagia [ ]Dysarthria [ ] Paresis [ ]Other   SKIN:   [ ]Normal   [ ]Pressure ulcer(s)  [ ]Rash    LABS:                        15.0   17.54 )-----------( 378      ( 2019 06:06 )             48.2       142  |  102  |  15  ----------------------------<  360<H>  3.6   |  22  |  0.81    Ca    9.6      2019 06:06  Phos  2.4       Mg     1.4         TPro  8.8<H>  /  Alb  3.9  /  TBili  1.3<H>  /  DBili  x   /  AST  72<H>  /  ALT  28  /  AlkPhos  436<H>    PT/INR - ( 2019 06:06 )   PT: 13.5 SEC;   INR: 1.21          PTT - ( 2019 21:56 )  PTT:28.7 SEC    Urinalysis Basic - ( 2019 22:50 )    Color: YELLOW / Appearance: CLEAR / S.020 / pH: 6.5  Gluc: >1000 / Ketone: MODERATE  / Bili: NEGATIVE / Urobili: NORMAL   Blood: MODERATE / Protein: >600 / Nitrite: NEGATIVE   Leuk Esterase: NEGATIVE / RBC: 11-25 / WBC 0-2   Sq Epi: FEW / Non Sq Epi: x / Bacteria: NEGATIVE      RADIOLOGY & ADDITIONAL STUDIES:    PROTEIN CALORIE MALNUTRITION PRESENT: [ ] Yes [ ] No  [ ] PPSV2 < or = to 30% [ ] significant weight loss  [ ] poor nutritional intake [ ] catabolic state [ ] anasarca     Albumin, Serum: 3.9 g/dL (19 @ 20:15)      REFERRALS:   [ ]Chaplaincy  [ ] Hospice  [ ]Child Life  [ ]Social Work  [ ]Case management [ ]Holistic Therapy   Goals of Care Discussion Document: PAUL Christopher (19 @ 09:22)  Goals of Care Conversation:   Participants:  · Child(anthony)  Daughter Leigh and Son Arash    Advance Directives:  · Does patient have Advance Directive  No  · Do you want to complete the HCP and name a Jesus Care Agent  No  · Caregiver:  no    Conversation Discussion:  · Conversation  Diagnosis; Prognosis; MOLST Discussed; Hospice Referral  · Conversation Details  Discussed patient's ailments and general prognosis, which the family understood is grim. Both son and daughter (separately) confirmed that the patient would not want to be sustained alive by artifical means. Opt for DNR/DNI. Do not want feeding tube of any kind. Will discuss full comfort care options later in the day.  MOLST filled. Will be updated if full comfort measures are chosen.    Veronica Christopher MD  [facility] PGY-2  Pager [phone]      Electronic Signatures:  Veronica Christopher)  (Signed 2019 09:26)  	Authored: Goals of Care Conversation      Last Updated: 2019 09:26 by Veronica Christopher) HPI:  89 year old woman with Afib on Xarelto, HTN, HLD, uncontrolled T2DM (A1c 11.6 2019) and recent admission for right hip fracture s/p nail fixation BIBEMS for unresponsiveness at home. Per daughter at bedside, the patient was found down in her apartment, incontinent of urine with blood vs. blood-tinged vomitus around mouth by EMS sent by Physical Therapist after patient did not respond to multiple calls today. The daughter spoke with the patient last yesterday evening. Patient complained only of uncontrolled right hip pain. Patient resides alone and previously capable of ADLs independently. Now dependent on walker on wheels for ambulation and refused home services/home health aide after discharge from [facility] about 2 weeks ago. With the exception of opioids, the daughter believes that her mother has not been taking her medications as prescribed or eating adequately since returning home. On chart review, patient was discharge on Lantus 22U qhs and Humalog 8 U TID with meals but the patient's daughter says that the patient did not want to take insulin.     The patient was a code stroke in the ED after CT head showed an acute L sided MCA/PCA stroke. She was also found to be in afib with RVR with FS in the 400s. She was initially started on a cardizem drip and insulin drip and MICU was consulted. She was not deemed a MICU candidate given her VBG did not show acidosis suggesting she was not in DKA and did not require an insulin drip. The patient's daughter at the bedside states that her father just passed away from a stroke 3 months ago. She had initiated a GOC with her mother but she did not want to participate. She feels her mother would not want aggressive interventions since she was previously independent but is not ready to make a decision at this time. (2019 05:46)    patient resting in bed in mild respiratory distress. She appears tachypneic     PERTINENT PM/SXH:   Atrial fibrillation, unspecified type  HLD (hyperlipidemia)  DM2 (diabetes mellitus, type 2)  HTN (hypertension)  Arthritis involving multiple sites    S/P hip replacement    FAMILY HISTORY:  No pertinent family history in first degree relatives      SOCIAL HISTORY:   Significant other/partner: Yes [ ]  No [x ] Children:  Yes [ x]  No [ ] Taoist/Spirituality:  Substance hx: Yes[ ]  No [ x]   Tobacco hx:  Yes [ ] No [x ]   Alcohol hx: Yes [ ] No [x]   Home Opioid hx:  [ ] I-Stop Reference No:  Living Situation: [x ]Home  [ ]Long term care  [ ]Rehab [ ]Other    ADVANCE DIRECTIVES:    DNR  Yes  MOLST  [x ]  Living Will  [ ]   DECISION MAKER(s):  [ ] Health Care Proxy(s)  [x ] Surrogate(s)  [ ] Guardian           Name(s): Phone Number(s): Liegh  [phone] (daughter)    BASELINE (I)ADL(s) (prior to admission):  [place]: [x ]Total  [ ] Moderate [ ]Dependent    Allergies    No Known Allergies    Intolerances    MEDICATIONS  (STANDING):  ALBUTerol/ipratropium for Nebulization 3 milliLiter(s) Nebulizer every 6 hours  dextrose 5%. 1000 milliLiter(s) (50 mL/Hr) IV Continuous <Continuous>  dextrose 50% Injectable 12.5 Gram(s) IV Push once  dextrose 50% Injectable 25 Gram(s) IV Push once  dextrose 50% Injectable 25 Gram(s) IV Push once  diltiazem Infusion 20 mG/Hr (20 mL/Hr) IV Continuous <Continuous>  insulin glargine Injectable (LANTUS) 8 Unit(s) SubCutaneous at bedtime  insulin lispro (HumaLOG) corrective regimen sliding scale   SubCutaneous every 6 hours  morphine  - Injectable 1 milliGRAM(s) IV Push every 4 hours    MEDICATIONS  (PRN):  dextrose 40% Gel 15 Gram(s) Oral once PRN Blood Glucose LESS THAN 70 milliGRAM(s)/deciliter  glucagon  Injectable 1 milliGRAM(s) IntraMuscular once PRN Glucose LESS THAN 70 milligrams/deciliter  morphine  - Injectable 1 milliGRAM(s) IV Push every 2 hours PRN dyspnea, trouble breathing, discomfort    PRESENT SYMPTOMS: [ x]Unable to obtain due to poor mentation   Source if other than patient:  [ ]Family   [ ]Team     Pain (Impact on QOL):    Location -   Severity -        Minimal acceptable level (0-10 scale):  Quality:   Onset:   Duration:                 Aggravating factors -  Relieving factors -  Radiation -    PAIN AD Score:     [email or web address] (press ctrl +  left click to view)    Dyspnea:  Yes [ ] No [ ] - [ ]Mild [ ]Moderate [ ]Severe  Anxiety:    Yes [ ] No [ ] - [ ]Mild [ ]Moderate [ ]Severe  Fatigue:    Yes [ ] No [ ] - [ ]Mild [ ]Moderate [ ]Severe  Nausea:    Yes [ ] No [ ] - [ ]Mild [ ]Moderate [ ]Severe                         Loss of appetite: Yes [ ] No [ ] - [ ]Mild [ ]Moderate [ ]Severe             Constipation:  Yes [ ] No [ ] - [ ]Mild [ ]Moderate [ ]Severe  Grief:  Yes [ ] No [ ]     Other Symptoms:  [ ]All other review of systems negative     Karnofsky Performance Score/Palliative Performance Status Version 2:         20%    http://palliative.info/resource_material/[email or web address].pdf  PHYSICAL EXAM:  Vital Signs Last 24 Hrs  T(C): 37.3 (10 Yair 2019 09:08), Max: 37.4 (2019 17:23)  T(F): 99.2 (10 Yair 2019 09:08), Max: 99.3 (2019 17:23)  HR: 102 (10 Yair 2019 09:08) (102 - 129)  BP: 151/79 (10 Yair 2019 09:08) (141/79 - 160/79)  BP(mean): --  RR: 23 (10 Yair 2019 09:08) (18 - 26)  SpO2: 97% (10 Yair 2019 09:08) (96% - 100%) I&O's Summary    2019 07:01  -  10 Yair 2019 07:00  --------------------------------------------------------  IN: 740 mL / OUT: 375 mL / NET: 365 mL    GENERAL:  [ ]Alert  [ ]Oriented x   [ ]Lethargic  [ ]Cachexia  [ x]Unarousable  [ ]Verbal  [ ]Non-Verbal  Behavioral:   [ ] Anxiety  [ ] Delirium [ ] Agitation [ ] Other  HEENT:  [ ]Normal   [x ]Dry mouth   [ ]ET Tube/Trach  [ ]Oral lesions  PULMONARY:   [ ]Clear  [x ]Tachypnea  [ ]Audible excessive secretions   [ ]Rhonchi        [ ]Right [ ]Left [ ]Bilateral  [ ]Crackles        [ ]Right [ ]Left [ ]Bilateral  [ ]Wheezing     [ ]Right [ ]Left [ ]Bilateral  CARDIOVASCULAR:    [ ]Regular [ ]Irregular [ x]Tachy  [ ]Boo [ ]Murmur [ ]Other  GASTROINTESTINAL:  [x ]Soft  [ ]Distended   [ ]+BS  [x]Non tender [ ]Tender  [ ]PEG [ ]OGT/ NGT  Last BM:     GENITOURINARY:  [ ]Normal [x ] Incontinent   [ ]Oliguria/Anuria   [ ][place]  MUSCULOSKELETAL:   [ ]Normal   []Weakness  [ x]Bed/Wheelchair bound [ ]Edema  NEUROLOGIC:   [ ]No focal deficits  [x ] Cognitive impairment  [ ] Dysphagia [ ]Dysarthria [ ] Paresis [ ]Other   SKIN:   [ x]Normal   [ ]Pressure ulcer(s)  [ ]Rash    LABS:                        15.0   17.54 )-----------( 378      ( 2019 06:06 )             48.2   06-    142  |  102  |  15  ----------------------------<  360<H>  3.6   |  22  |  0.81    Ca    9.6      2019 06:06  Phos  2.4     -09  Mg     1.4     -09    TPro  8.8<H>  /  Alb  3.9  /  TBili  1.3<H>  /  DBili  x   /  AST  72<H>  /  ALT  28  /  AlkPhos  436<H>  06-08  PT/INR - ( 2019 06:06 )   PT: 13.5 SEC;   INR: 1.21          PTT - ( 2019 21:56 )  PTT:28.7 SEC    Urinalysis Basic - ( 2019 22:50 )    Color: YELLOW / Appearance: CLEAR / S.020 / pH: 6.5  Gluc: >1000 / Ketone: MODERATE  / Bili: NEGATIVE / Urobili: NORMAL   Blood: MODERATE / Protein: >600 / Nitrite: NEGATIVE   Leuk Esterase: NEGATIVE / RBC: 11-25 / WBC 0-2   Sq Epi: FEW / Non Sq Epi: x / Bacteria: NEGATIVE      RADIOLOGY & ADDITIONAL STUDIES:    EXAM:  CT BRAIN    PROCEDURE DATE:  2019   IMPRESSION:  Acute infarcts involving nearly the entirety of the left MCA and PCA   territories territory infarcts. Findings may be seen in the setting of a   proximal left internal carotid artery occlusion with associated fetal   origin of the left posterior cerebral artery.    Findings discussed by Dr. Gonzalez to Dr. Wagner on 2019 at 8:55 PM.      < from: Xray Chest 1 View- PORTABLE-Urgent (19 @ 21:39) >  EXAM:  XR CHEST PORTABLE URGENT 1V        PROCEDURE DATE:  2019 IMPRESSION:   Bilateral diffuse chronic lung changes.    < end of copied text >    PROTEIN CALORIE MALNUTRITION PRESENT: [ ] Yes [ ] No  [ ] PPSV2 < or = to 30% [ ] significant weight loss  [ ] poor nutritional intake [ ] catabolic state [ ] anasarca     Albumin, Serum: 3.9 g/dL (19 @ 20:15)      REFERRALS:   [ ]Chaplaincy  [ ] Hospice  [ ]Child Life  [ ]Social Work  [ ]Case management [ ]Holistic Therapy

## 2019-06-10 NOTE — PROGRESS NOTE ADULT - PROBLEM SELECTOR PLAN 4
- patient with afib with RVR 2/2 noncompliance supposed to be on metoprolol and Xarelto at home  - holding all AC at this time 2/2 large size of CVA and risk of hemorrhagic conversion/rebleeding  - Dilt gtt  -tele - patient with afib with RVR 2/2 noncompliance supposed to be on metoprolol and Xarelto at home  - holding all AC at this time 2/2 large size of CVA and risk of hemorrhagic conversion/rebleeding  - likely contributed by respiratory distress  - d/c tele monitor.

## 2019-06-10 NOTE — CONSULT NOTE ADULT - PROBLEM SELECTOR RECOMMENDATION 9
Patient was placed on morphine 1mg q4h ATC with 1mg q2h PRN. Would recommend Morphine drip at 0.2mg/hr with 1mg q2h PRN. Patient was placed on morphine 1mg q4h ATC with 1mg q2h PRN. Would recommend Morphine drip at 0.5mg/hr with 1mg q2h PRN.

## 2019-06-11 NOTE — PROGRESS NOTE ADULT - PROBLEM SELECTOR PLAN 4
Patient with evolving CVA, daughter would like to concentrate on comfort and not have her mother suffer.

## 2019-06-11 NOTE — PROGRESS NOTE ADULT - SUBJECTIVE AND OBJECTIVE BOX
INTERVAL HPI/OVERNIGHT EVENTS:    Patient in bed, appears uncomfortable, tachypneic and now with Excessive secretions.     Code Status:   Allergies    No Known Allergies    Intolerances    MEDICATIONS  (STANDING):  glycopyrrolate Injectable 0.4 milliGRAM(s) IV Push every 6 hours  glycopyrrolate Injectable 0.4 milliGRAM(s) IV Push once  morphine  - Injectable 2 milliGRAM(s) IV Push once  morphine  Infusion 1 mG/Hr (1 mL/Hr) IV Continuous <Continuous>    MEDICATIONS  (PRN):  morphine  - Injectable 1 milliGRAM(s) IV Push every 2 hours PRN dyspnea, trouble breathing, discomfort      PRESENT SYMPTOMS: [x ]Unable to obtain due to poor mentation   Source if other than patient:  [ ]Family   [ ]Team     Pain (Impact on QOL):    Location:  Severity:  Minimal acceptable level (0-10 scale):       Quality:       Onset:  Duration:  Aggravating factors:  Relieving Factors  Radiation:    Dyspnea:  Yes [ ] No [ ] - [ ]Mild [ ]Moderate [ ]Severe  Anxiety:    Yes [ ] No [ ] - [ ]Mild [ ]Moderate [ ]Severe  Fatigue:    Yes [ ] No [ ] - [ ]Mild [ ]Moderate [ ]Severe  Nausea:    Yes [ ] No [ ] - [ ]Mild [ ]Moderate [ ]Severe                         Loss of appetite: Yes [ ] No [ ] - [ ]Mild [ ]Moderate [ ]Severe             Constipation:  Yes [ ] No [ ] - [ ]Mild [ ]Moderate [ ]Severe  Grief:  Yes [ ] No [ ]     PAIN AD Score:	  [email or web address].[email or web address]/painad.pdf (Ctrl + left click to view)    Other Symptoms:  [ ]All other review of systems negative     Karnofsky Performance Score/Palliative Performance Status Version 2:     10%  http://palliative.info/resource_material/PPSv2.pdf    PHYSICAL EXAM:  Vital Signs Last 24 Hrs  T(C): 36.9 (11 Jun 2019 09:42), Max: 37.4 (11 Jun 2019 05:32)  T(F): 98.4 (11 Jun 2019 09:42), Max: 99.3 (11 Jun 2019 05:32)  HR: 92 (11 Jun 2019 09:42) (48 - 154)  BP: 162/72 (11 Jun 2019 09:42) (129/87 - 162/96)  BP(mean): --  RR: 18 (11 Jun 2019 09:42) (18 - 27)  SpO2: 94% (11 Jun 2019 09:42) (91% - 98%) I&O's Summary    10 Yair 2019 07:01  -  11 Jun 2019 07:00  --------------------------------------------------------  IN: 0 mL / OUT: 650 mL / NET: -650 mL    GENERAL:  [ ]Alert  [ ]Oriented x   [ ]Lethargic  [ ]Cachexia  [x]Unarousable  [ ]Verbal  [ ]Non-Verbal  Behavioral:   [ ] Anxiety  [ ] Delirium [ ] Agitation [ ] Other  HEENT:  [ ]Normal   [x]Dry mouth   [ ]ET Tube/Trach  [ ]Oral lesions  PULMONARY:   [ ]Clear [x]Tachypnea  [x]Audible excessive secretions   [ ]Rhonchi        [ ]Right [ ]Left [ ]Bilateral  [ ]Crackles        [ ]Right [ ]Left [ ]Bilateral  [ ]Wheezing     [ ]Right [ ]Left [ ]Bilateral  CARDIOVASCULAR:    [ ]Regular [ ]Irregular [x]Tachy  [ ]Boo [ ]Murmur [ ]Other  GASTROINTESTINAL:  [x]Soft  [ ]Distended   [ ]+BS  [x]Non tender [ ]Tender  [ ]PEG [ ]OGT/ NGT   Last BM:      GENITOURINARY:  [ ]Normal x] Incontinent   [ ]Oliguria/Anuria   [ ]Angeles  MUSCULOSKELETAL:   [ ]Normal   [ ]Weakness  [x ]Bed/Wheelchair bound [ ]Edema  NEUROLOGIC:   [ ]No focal deficits  [x ] Cognitive impairment  [ ] Dysphagia [ ]Dysarthria [ ] Paresis [ ]Other   SKIN:   [x ]Normal   [ ]Pressure ulcer(s)  [ ]Rash    CRITICAL CARE:  [ ] Shock Present  [ ]Septic [ ]Cardiogenic [ ]Neurologic [ ]Hypovolemic  [ ]  Vasopressors [ ]  Inotropes   [ ] Respiratory failure present  [ ] Acute  [ ] Chronic [ ] Hypoxic  [ ] Hypercarbic [ ] Other  [ ] Other organ failure     LABS:      RADIOLOGY & ADDITIONAL STUDIES:    Protein Calorie Malnutrition Present: [ ] yes [ ] no  [ ] PPSV2 < or = 30%  [ ] significant weight loss [ ] poor nutritional intake [ ] anasarca [ ] catabolic state Albumin, Serum: 3.9 g/dL (06-08-19 @ 20:15)      REFERRALS:   [ ]Chaplaincy  [ ] Hospice  [ ]Child Life  [ ]Social Work  [ ]Case management [ ]Holistic Therapy INTERVAL HPI/OVERNIGHT EVENTS:    Patient in bed, appears uncomfortable, tachypneic and now with Excessive secretions.     Code Status: DNR/DNI  Allergies    No Known Allergies    Intolerances    MEDICATIONS  (STANDING):  glycopyrrolate Injectable 0.4 milliGRAM(s) IV Push every 6 hours  glycopyrrolate Injectable 0.4 milliGRAM(s) IV Push once  morphine  - Injectable 2 milliGRAM(s) IV Push once  morphine  Infusion 1 mG/Hr (1 mL/Hr) IV Continuous <Continuous>    MEDICATIONS  (PRN):  morphine  - Injectable 1 milliGRAM(s) IV Push every 2 hours PRN dyspnea, trouble breathing, discomfort      PRESENT SYMPTOMS: [x ]Unable to obtain due to poor mentation   Source if other than patient:  [ ]Family   [ ]Team     Pain (Impact on QOL):    Location:  Severity:  Minimal acceptable level (0-10 scale):       Quality:       Onset:  Duration:  Aggravating factors:  Relieving Factors  Radiation:    Dyspnea:  Yes [ ] No [ ] - [ ]Mild [ ]Moderate [ ]Severe  Anxiety:    Yes [ ] No [ ] - [ ]Mild [ ]Moderate [ ]Severe  Fatigue:    Yes [ ] No [ ] - [ ]Mild [ ]Moderate [ ]Severe  Nausea:    Yes [ ] No [ ] - [ ]Mild [ ]Moderate [ ]Severe                         Loss of appetite: Yes [ ] No [ ] - [ ]Mild [ ]Moderate [ ]Severe             Constipation:  Yes [ ] No [ ] - [ ]Mild [ ]Moderate [ ]Severe  Grief:  Yes [ ] No [ ]     PAIN AD Score:	  [email or web address]/painad.pdf (Ctrl + left click to view)    Other Symptoms:  [ ]All other review of systems negative     Karnofsky Performance Score/Palliative Performance Status Version 2:     10%  http://palliative.info/resource_material/PPSv2.pdf    PHYSICAL EXAM:  Vital Signs Last 24 Hrs  T(C): 36.9 (11 Jun 2019 09:42), Max: 37.4 (11 Jun 2019 05:32)  T(F): 98.4 (11 Jun 2019 09:42), Max: 99.3 (11 Jun 2019 05:32)  HR: 92 (11 Jun 2019 09:42) (48 - 154)  BP: 162/72 (11 Jun 2019 09:42) (129/87 - 162/96)  BP(mean): --  RR: 18 (11 Jun 2019 09:42) (18 - 27)  SpO2: 94% (11 Jun 2019 09:42) (91% - 98%) I&O's Summary    10 Yair 2019 07:01  -  11 Jun 2019 07:00  --------------------------------------------------------  IN: 0 mL / OUT: 650 mL / NET: -650 mL    GENERAL:  [ ]Alert  [ ]Oriented x   [ ]Lethargic  [ ]Cachexia  [x]Unarousable  [ ]Verbal  [ ]Non-Verbal  Behavioral:   [ ] Anxiety  [ ] Delirium [ ] Agitation [ ] Other  HEENT:  [ ]Normal   [x]Dry mouth   [ ]ET Tube/Trach  [ ]Oral lesions  PULMONARY:   [ ]Clear [x]Tachypnea  [x]Audible excessive secretions   [ ]Rhonchi        [ ]Right [ ]Left [ ]Bilateral  [ ]Crackles        [ ]Right [ ]Left [ ]Bilateral  [ ]Wheezing     [ ]Right [ ]Left [ ]Bilateral  CARDIOVASCULAR:    [ ]Regular [ ]Irregular [x]Tachy  [ ]Boo [ ]Murmur [ ]Other  GASTROINTESTINAL:  [x]Soft  [ ]Distended   [ ]+BS  [x]Non tender [ ]Tender  [ ]PEG [ ]OGT/ NGT   Last BM:      GENITOURINARY:  [ ]Normal x] Incontinent   [ ]Oliguria/Anuria   [ ]Angeles  MUSCULOSKELETAL:   [ ]Normal   [ ]Weakness  [x ]Bed/Wheelchair bound [ ]Edema  NEUROLOGIC:   [ ]No focal deficits  [x ] Cognitive impairment  [ ] Dysphagia [ ]Dysarthria [ ] Paresis [ ]Other   SKIN:   [x ]Normal   [ ]Pressure ulcer(s)  [ ]Rash    CRITICAL CARE:  [ ] Shock Present  [ ]Septic [ ]Cardiogenic [ ]Neurologic [ ]Hypovolemic  [ ]  Vasopressors [ ]  Inotropes   [ ] Respiratory failure present  [ ] Acute  [ ] Chronic [ ] Hypoxic  [ ] Hypercarbic [ ] Other  [ ] Other organ failure     LABS:      RADIOLOGY & ADDITIONAL STUDIES:    Protein Calorie Malnutrition Present: [ ] yes [ ] no  [ ] PPSV2 < or = 30%  [ ] significant weight loss [ ] poor nutritional intake [ ] anasarca [ ] catabolic state Albumin, Serum: 3.9 g/dL (06-08-19 @ 20:15)      REFERRALS:   [ ]Chaplaincy  [ ] Hospice  [ ]Child Life  [ ]Social Work  [ ]Case management [ ]Holistic Therapy INTERVAL HPI/OVERNIGHT EVENTS:    Patient in bed, appears uncomfortable, tachypneic and now with Excessive secretions.     Code Status: DNR/DNI  Allergies    No Known Allergies    Intolerances    MEDICATIONS  (STANDING):  glycopyrrolate Injectable 0.4 milliGRAM(s) IV Push every 6 hours  glycopyrrolate Injectable 0.4 milliGRAM(s) IV Push once  morphine  - Injectable 2 milliGRAM(s) IV Push once  morphine  Infusion 1 mG/Hr (1 mL/Hr) IV Continuous <Continuous>    MEDICATIONS  (PRN):  morphine  - Injectable 1 milliGRAM(s) IV Push every 2 hours PRN dyspnea, trouble breathing, discomfort      PRESENT SYMPTOMS: [x ]Unable to obtain due to poor mentation   Source if other than patient:  [ ]Family   [ ]Team     Pain (Impact on QOL):    Location:  Severity:  Minimal acceptable level (0-10 scale):       Quality:       Onset:  Duration:  Aggravating factors:  Relieving Factors  Radiation:    Dyspnea:  Yes [ ] No [ ] - [ ]Mild [ ]Moderate [ ]Severe  Anxiety:    Yes [ ] No [ ] - [ ]Mild [ ]Moderate [ ]Severe  Fatigue:    Yes [ ] No [ ] - [ ]Mild [ ]Moderate [ ]Severe  Nausea:    Yes [ ] No [ ] - [ ]Mild [ ]Moderate [ ]Severe                         Loss of appetite: Yes [ ] No [ ] - [ ]Mild [ ]Moderate [ ]Severe             Constipation:  Yes [ ] No [ ] - [ ]Mild [ ]Moderate [ ]Severe  Grief:  Yes [ ] No [ ]     PAIN AD Score:	  [email or web address]/painad.pdf (Ctrl + left click to view)    Other Symptoms:  [ ]All other review of systems negative     Karnofsky Performance Score/Palliative Performance Status Version 2:     10%  http://palliative.info/resource_material/PPSv2.pdf    PHYSICAL EXAM:  Vital Signs Last 24 Hrs  T(C): 36.9 (11 Jun 2019 09:42), Max: 37.4 (11 Jun 2019 05:32)  T(F): 98.4 (11 Jun 2019 09:42), Max: 99.3 (11 Jun 2019 05:32)  HR: 92 (11 Jun 2019 09:42) (48 - 154)  BP: 162/72 (11 Jun 2019 09:42) (129/87 - 162/96)  BP(mean): --  RR: 18 (11 Jun 2019 09:42) (18 - 27)  SpO2: 94% (11 Jun 2019 09:42) (91% - 98%) I&O's Summary    10 Yair 2019 07:01  -  11 Jun 2019 07:00  --------------------------------------------------------  IN: 0 mL / OUT: 650 mL / NET: -650 mL    GENERAL:  [ ]Alert  [ ]Oriented x   [ ]Lethargic  [ ]Cachexia  [x]Unarousable  [ ]Verbal  [ ]Non-Verbal  Behavioral:   [ ] Anxiety  [ ] Delirium [ ] Agitation [ ] Other  HEENT:  [ ]Normal   [x]Dry mouth   [ ]ET Tube/Trach  [ ]Oral lesions  PULMONARY:   [ ]Clear [x]Tachypnea  [x]Audible excessive secretions   [ ]Rhonchi        [ ]Right [ ]Left [ ]Bilateral  [ ]Crackles        [ ]Right [ ]Left [ ]Bilateral  [ ]Wheezing     [ ]Right [ ]Left [ ]Bilateral  CARDIOVASCULAR:    [ ]Regular [ ]Irregular [x]Tachy  [ ]Boo [ ]Murmur [ ]Other  GASTROINTESTINAL:  [x]Soft  [ ]Distended   [ ]+BS  [x]Non tender [ ]Tender  [ ]PEG [ ]OGT/ NGT   Last BM:      GENITOURINARY:  [ ]Normal [ ] Incontinent   [ ]Oliguria/Anuria   [ x]Angeles  MUSCULOSKELETAL:   [ ]Normal   [ ]Weakness  [x ]Bed/Wheelchair bound [ ]Edema  NEUROLOGIC:   [ ]No focal deficits  [x ] Cognitive impairment  [ ] Dysphagia [ ]Dysarthria [ ] Paresis [ ]Other   SKIN:   [x ]Normal   [ ]Pressure ulcer(s)  [ ]Rash    CRITICAL CARE:  [ ] Shock Present  [ ]Septic [ ]Cardiogenic [ ]Neurologic [ ]Hypovolemic  [ ]  Vasopressors [ ]  Inotropes   [ ] Respiratory failure present  [ ] Acute  [ ] Chronic [ ] Hypoxic  [ ] Hypercarbic [ ] Other  [ ] Other organ failure     LABS:      RADIOLOGY & ADDITIONAL STUDIES:    Protein Calorie Malnutrition Present: [ ] yes [ ] no  [ ] PPSV2 < or = 30%  [ ] significant weight loss [ ] poor nutritional intake [ ] anasarca [ ] catabolic state Albumin, Serum: 3.9 g/dL (06-08-19 @ 20:15)      REFERRALS:   [ ]Chaplaincy  [ ] Hospice  [ ]Child Life  [ ]Social Work  [ ]Case management [ ]Holistic Therapy

## 2019-06-11 NOTE — DISCHARGE NOTE PROVIDER - NSDCCPTREATMENT_GEN_ALL_CORE_FT
PRINCIPAL PROCEDURE  Procedure: CT head wo con  Findings and Treatment: Abnormal areas of low-attenuation involving the left temporal frontal   parietal and occipital cortical and subcortical region. These areas of   low-attenuation is more conspicuous when compared with the prior exam and   are compatible with a subacute left MCA and PCA infarct. These infarcts   have demonstrated expected evolutionary changes when compared with the   prior exam. No hemorrhagic transformation is seen this time. There is   localized mass effect consisting sulcal effacement. No significant shift   or herniation seen.  Evaluation of the osseous structures with appropriate window appears   unremarkable  The visualized paranasal sinuses mastoid and middle ear regions appear   clear.  Impression: Evolving infarcts as described above. PRINCIPAL PROCEDURE  Procedure: CT head wo con  Findings and Treatment: Abnormal areas of low-attenuation involving the left temporal frontal   parietal and occipital cortical and subcortical region. These areas of   low-attenuation is more conspicuous when compared with the prior exam and   are compatible with a subacute left MCA and PCA infarct. These infarcts   have demonstrated expected evolutionary changes when compared with the   prior exam. No hemorrhagic transformation is seen this time. There is   localized mass effect consisting sulcal effacement. No significant shift   or herniation seen.  Evaluation of the osseous structures with appropriate window appears   unremarkable  The visualized paranasal sinuses mastoid and middle ear regions appear   clear.  Impression: Evolving infarcts as described above.        SECONDARY PROCEDURE  Procedure: CT head wo con  Findings and Treatment: IMPRESSION:  Acute infarcts involving nearly the entirety of the left MCA and PCA   territories territory infarcts. Findings may be seen in the setting of a   proximal left internal carotid artery occlusion with associated fetal   origin of the left posterior cerebral artery.

## 2019-06-11 NOTE — DISCHARGE NOTE FOR THE EXPIRED PATIENT - HOSPITAL COURSE
88 yo female with a PMHx of afib on xarelto, HTN, HLD, uncontrolled T2DM, and recent admission for R hip fracture s/p nail fixation who was BIBEMS for unresponsiveness after being found down in her home. Her physical therapist called EMS after the patient did not respond to multiple calls. Last known to alert and oriented on 19 when she spoke to her daughter.  Patient resides alone and previously capable of ADLs independently. Now dependent on walker on wheels for ambulation and refused home services/home health aide after discharge from [facility] about 2 weeks ago. With the exception of opioids, the daughter believes that her mother has not been taking her medications as prescribed or eating adequately since returning home. On chart review, patient was discharge on Lantus 22U qhs and Humalog 8 U TID with meals but the patient's daughter says that the patient did not want to take insulin.     The patient was a code stroke in the ED after CT head showed a large acute L sided MCA/PCA stroke. She was also found to be in afib with RVR with FS in the 400s. She was initially started on a cardizem drip and insulin drip and MICU was consulted. She was not deemed a MICU candidate given her VBG did not show acidosis in spite of a serum betahydroxyburtyrate > 3 so she was transitioned from an insulin gtt to bolus insulin. She was then admitted to [facility] for further management. On the medicine floor she was placed on a nonrebreather for mild respiratory distress. The patient's daughter (present in person) and son (via telephone) decided to make the patient DNR/DNI and did not want to pursue aggressive measure. Endocrine was consulted and helped managed her insulin. Repeat CT head at 24 hours showed evolving infarcts and cerebral edema.     Palliative and hospice care services met with patient's daughter. Decision was made to make patient full comfort and put on morphine drip. Taken off telemetry. Daughter decided on a hospice facility. Patient is now being maintained on a morphine gtt for tachypnea. Pt  on 16:05.

## 2019-06-11 NOTE — PROGRESS NOTE ADULT - ASSESSMENT
89 year old woman with tachypnea, excessive secretions,  functional quadriplegia, CVA, and encounter for palliative care.

## 2019-06-11 NOTE — DISCHARGE NOTE PROVIDER - NSDCCPCAREPLAN_GEN_ALL_CORE_FT
PRINCIPAL DISCHARGE DIAGNOSIS  Diagnosis: Altered mental status  Assessment and Plan of Treatment: Mrs. Nur suffered a massive stroke. Please continue with family's wishes of comfort care.      SECONDARY DISCHARGE DIAGNOSES  Diagnosis: HTN (hypertension)  Assessment and Plan of Treatment:     Diagnosis: Tachycardia  Assessment and Plan of Treatment:     Diagnosis: Atrial fibrillation  Assessment and Plan of Treatment:     Diagnosis: Upper gastrointestinal bleed  Assessment and Plan of Treatment: PRINCIPAL DISCHARGE DIAGNOSIS  Diagnosis: Altered mental status  Assessment and Plan of Treatment: Mrs. Nur suffered a massive stroke. Please continue with family's wishes of comfort care. She is currently having her tachypnea managed by a morphine gtt for comfort.      SECONDARY DISCHARGE DIAGNOSES  Diagnosis: HTN (hypertension)  Assessment and Plan of Treatment: No further treatement at this time as the family has decided on comfort care measures only.    Diagnosis: Tachycardia  Assessment and Plan of Treatment: No further treatement at this time as the family has decided on comfort care measures only.    Diagnosis: Atrial fibrillation  Assessment and Plan of Treatment: No further treatement at this time as the family has decided on comfort care measures only.

## 2019-06-11 NOTE — PROGRESS NOTE ADULT - PROBLEM SELECTOR PROBLEM 1
CVA (cerebrovascular accident due to intracerebral hemorrhage)
Tachypnea
Type 2 diabetes mellitus with hyperglycemia, without long-term current use of insulin

## 2019-06-11 NOTE — PROGRESS NOTE ADULT - PROBLEM SELECTOR PLAN 6
- no active management at this time. - patient initially with lactate of 6 which has subsequently downtrended to 3.5  - etiology DKA versus seizure event precipitated by ICH with DKA less likely given lack of acidosis on VBG

## 2019-06-11 NOTE — PROGRESS NOTE ADULT - ASSESSMENT
89 year old woman with Afib on Xarelto, HTN, HLD, uncontrolled T2DM  and recent right hip fracture s/p nail fixation now minimally responsive with acute ischemic strokes in left MCA and PCA distribution likely cardioembolic etiology in setting of Afib with RVR, complicated by hyperglycemia likely secondary to medication non compliance. Pending placement to hospice care. 89 year old woman with Afib on Xarelto, HTN, HLD, uncontrolled T2DM  and recent right hip fracture s/p nail fixation now minimally responsive with acute ischemic strokes in left MCA and PCA distribution likely cardioembolic etiology in setting of Afib with RVR, complicated by hyperglycemia likely secondary to medication non compliance. Poor prognosis - hospice with comfort care as  goal.

## 2019-06-11 NOTE — PROGRESS NOTE ADULT - PROBLEM SELECTOR PLAN 8
- Holding all chemical AC given acute CVA  - NPO  - dispo to hospice care - after extensive discussion with daughter, she voices that her mother would not have wanted aggressive life support as she was previously very independent   - confirmed DNR/DNI/no feeding tube at this time  - MOLST filled out  - continue supportive, comfort directed measures including oxygen treatments and pain medications (morphine gtt w/t Q2 PRN breakthrough tachypnea)

## 2019-06-11 NOTE — PROGRESS NOTE ADULT - SUBJECTIVE AND OBJECTIVE BOX
Dr. Eduard Art   Internal Medicine PGY-1  Pager [phone] ([facility])/[phone] ([facility])    Patient is a 89y old  Female who presents with a chief complaint of Stroke (10 Yair 2019 11:31)      SUBJECTIVE / OVERNIGHT EVENTS:  TAYLOR ON. Titrating morphine gtt. Dc'd diltiazem gtt. Unesponsive, only L side of body moving.     MEDICATIONS  (STANDING):  morphine  Infusion 0.5 mG/Hr (0.5 mL/Hr) IV Continuous <Continuous>    MEDICATIONS  (PRN):  morphine  - Injectable 1 milliGRAM(s) IV Push every 2 hours PRN dyspnea, trouble breathing, discomfort      Vital Signs Last 24 Hrs  T(C): 37.4 (11 Jun 2019 05:32), Max: 37.4 (11 Jun 2019 05:32)  T(F): 99.3 (11 Jun 2019 05:32), Max: 99.3 (11 Jun 2019 05:32)  HR: 121 (11 Jun 2019 05:32) (48 - 154)  BP: 129/87 (11 Jun 2019 05:32) (129/87 - 162/96)  BP(mean): --  RR: 27 (11 Jun 2019 05:32) (20 - 27)  SpO2: 94% (11 Jun 2019 05:32) (91% - 98%)  CAPILLARY BLOOD GLUCOSE        I&O's Summary    10 Yair 2019 07:01  -  11 Jun 2019 07:00  --------------------------------------------------------  IN: 0 mL / OUT: 650 mL / NET: -650 mL        PHYSICAL EXAM:  GENERAL: NAD, well-developed  HEAD:  Atraumatic, Normocephalic  EYES: EOMI, PERRLA, conjunctiva and sclera clear  NECK: Supple, No JVD  CHEST/LUNG: Clear to auscultation bilaterally; No wheeze  HEART: Regular rate and rhythm; No murmurs, rubs, or gallops  ABDOMEN: Soft, Nontender, Nondistended; Bowel sounds present  EXTREMITIES:  2+ Peripheral Pulses, No clubbing, cyanosis, or edema  PSYCH: AAOx0  NEUROLOGY: non-focal  SKIN: No rashes or lesions    LABS:                    RADIOLOGY & ADDITIONAL TESTS:    Imaging Personally Reviewed:    Consultant(s) Notes Reviewed:      Care Discussed with Consultants/Other Providers: Dr. Eduard Art   Internal Medicine PGY-1  Pager [phone] ([facility])/[phone] ([facility])    Patient is a 89y old  Female who presents with a chief complaint of Stroke (10 Yair 2019 11:31)      SUBJECTIVE / OVERNIGHT EVENTS:  TAYLOR ON. Titrating morphine gtt. Dc'd diltiazem gtt. Unresponsive, only L side of body moving. Tachypnea.     MEDICATIONS  (STANDING):  morphine  Infusion 0.5 mG/Hr (0.5 mL/Hr) IV Continuous <Continuous>    MEDICATIONS  (PRN):  morphine  - Injectable 1 milliGRAM(s) IV Push every 2 hours PRN dyspnea, trouble breathing, discomfort      Vital Signs Last 24 Hrs  T(C): 37.4 (11 Jun 2019 05:32), Max: 37.4 (11 Jun 2019 05:32)  T(F): 99.3 (11 Jun 2019 05:32), Max: 99.3 (11 Jun 2019 05:32)  HR: 121 (11 Jun 2019 05:32) (48 - 154)  BP: 129/87 (11 Jun 2019 05:32) (129/87 - 162/96)  BP(mean): --  RR: 27 (11 Jun 2019 05:32) (20 - 27)  SpO2: 94% (11 Jun 2019 05:32) (91% - 98%)  CAPILLARY BLOOD GLUCOSE        I&O's Summary    10 Yair 2019 07:01  -  11 Jun 2019 07:00  --------------------------------------------------------  IN: 0 mL / OUT: 650 mL / NET: -650 mL        PHYSICAL EXAM:  GENERAL: NAD, well-developed  HEAD:  Atraumatic, Normocephalic  EYES: EOMI, PERRLA, conjunctiva and sclera clear  NECK: Supple, No JVD  CHEST/LUNG: Clear to auscultation bilaterally; No wheeze. Tachypnea.   HEART: Regular rate and rhythm; No murmurs, rubs, or gallops  ABDOMEN: Soft, Nontender, Nondistended; Bowel sounds present  EXTREMITIES:  2+ Peripheral Pulses, No clubbing, cyanosis, or edema  PSYCH: AAOx0  NEUROLOGY: non-focal  SKIN: No rashes or lesions    LABS:                    RADIOLOGY & ADDITIONAL TESTS:    Imaging Personally Reviewed:    Consultant(s) Notes Reviewed:      Care Discussed with Consultants/Other Providers: Dr. Eduard Art   Internal Medicine PGY-1  Pager [phone] ([facility])/[phone] ([facility])    Patient is a 89y old  Female who presents with a chief complaint of Stroke (10 Yair 2019 11:31)      SUBJECTIVE / OVERNIGHT EVENTS:  TAYLOR ON. Titrating morphine gtt. Dc'd diltiazem gtt. Unresponsive, only L side of body moving. Significant tachypnea with tachycardia.    MEDICATIONS  (STANDING):  morphine  Infusion 0.5 mG/Hr (0.5 mL/Hr) IV Continuous <Continuous>    MEDICATIONS  (PRN):  morphine  - Injectable 1 milliGRAM(s) IV Push every 2 hours PRN dyspnea, trouble breathing, discomfort      Vital Signs Last 24 Hrs  T(C): 37.4 (11 Jun 2019 05:32), Max: 37.4 (11 Jun 2019 05:32)  T(F): 99.3 (11 Jun 2019 05:32), Max: 99.3 (11 Jun 2019 05:32)  HR: 121 (11 Jun 2019 05:32) (48 - 154)  BP: 129/87 (11 Jun 2019 05:32) (129/87 - 162/96)  BP(mean): --  RR: 27 (11 Jun 2019 05:32) (20 - 27)  SpO2: 94% (11 Jun 2019 05:32) (91% - 98%)  CAPILLARY BLOOD GLUCOSE        I&O's Summary    10 Yair 2019 07:01  -  11 Jun 2019 07:00  --------------------------------------------------------  IN: 0 mL / OUT: 650 mL / NET: -650 mL        PHYSICAL EXAM:  GENERAL: In marked respiratory distress  HEAD:  Atraumatic, Normocephalic  EYES: EOMI, PERRLA, conjunctiva and sclera clear  NECK: Supple, No JVD  CHEST/LUNG:  Tachypnea.   HEART: Tachycardia  ABDOMEN: Soft, Nontender, Nondistended; Bowel sounds present  EXTREMITIES:  2+ Peripheral Pulses, No clubbing, cyanosis, or edema  PSYCH: AAOx0  NEUROLOGY: non-focal  SKIN: No rashes or lesions    LABS:                    RADIOLOGY & ADDITIONAL TESTS:    Imaging Personally Reviewed:    Consultant(s) Notes Reviewed:      Care Discussed with Consultants/Other Providers: Dr. Eduard Art   Internal Medicine PGY-1  Pager [phone] ([facility])/[phone] ([facility])    Patient is a 89y old  Female who presents with a chief complaint of Stroke (10 Yair 2019 11:31)      SUBJECTIVE / OVERNIGHT EVENTS:  TAYLOR ON. Titrating morphine gtt. Dc'd diltiazem gtt. Unresponsive, only L side of body moving. Significant tachypnea with tachycardia.    MEDICATIONS  (STANDING):  morphine  Infusion 0.5 mG/Hr (0.5 mL/Hr) IV Continuous <Continuous>    MEDICATIONS  (PRN):  morphine  - Injectable 1 milliGRAM(s) IV Push every 2 hours PRN dyspnea, trouble breathing, discomfort      Vital Signs Last 24 Hrs  T(C): 37.4 (11 Jun 2019 05:32), Max: 37.4 (11 Jun 2019 05:32)  T(F): 99.3 (11 Jun 2019 05:32), Max: 99.3 (11 Jun 2019 05:32)  HR: 121 (11 Jun 2019 05:32) (48 - 154)  BP: 129/87 (11 Jun 2019 05:32) (129/87 - 162/96)  BP(mean): --  RR: 27 (11 Jun 2019 05:32) (20 - 27)  SpO2: 94% (11 Jun 2019 05:32) (91% - 98%)  CAPILLARY BLOOD GLUCOSE        I&O's Summary    10 Yair 2019 07:01  -  11 Jun 2019 07:00  --------------------------------------------------------  IN: 0 mL / OUT: 650 mL / NET: -650 mL        PHYSICAL EXAM:  GENERAL: In marked respiratory distress  HEAD:  Atraumatic, Normocephalic  EYES: EOMI, PERRLA, conjunctiva and sclera clear  NECK: Supple, No JVD  CHEST/LUNG:  Tachypnea.   HEART: Tachycardia  ABDOMEN: Soft, Nontender, Nondistended; Bowel sounds present  EXTREMITIES:  2+ Peripheral Pulses, No clubbing, cyanosis, or edema  PSYCH: AAOx0  NEUROLOGY: R sided hemiparesis  SKIN: No rashes or lesions    LABS:                    RADIOLOGY & ADDITIONAL TESTS:    Imaging Personally Reviewed:    Consultant(s) Notes Reviewed:      Care Discussed with Consultants/Other Providers:

## 2019-06-11 NOTE — PROGRESS NOTE ADULT - PROBLEM SELECTOR PLAN 1
Large acute L sided PCA/MCA infarcts with effacement on CT resulting in R sided paralysis, fixed gaze, and encephalopathy. Worse on repeat CTH   - per discussion with patient's daughter, patient would not have wanted aggressive interventions --> DNR/DNI, no PEG.  - comfort measures.  - d/c tele monitor.  - Hospice placement pending

## 2019-06-11 NOTE — PROGRESS NOTE ADULT - PROBLEM SELECTOR PLAN 9
- Holding all chemical AC given acute CVA  - NPO  - poor prognosis at this time
- Holding all chemical AC given acute CVA  - NPO  - dispo to hospice care

## 2019-06-11 NOTE — DISCHARGE NOTE PROVIDER - HOSPITAL COURSE
88 yo female with a PMHx of afib on xarelto, HTN, HLD, uncontrolled T2DM, and recent admission for R hip fracture s/p nail fixation who was BIBEMS for unresponsiveness after being found down in her home. Her physical therapist called EMS after the patient did not respond to multiple calls. Last known to alert and oriented on 6/7/19 when she spoke to her daughter.  Patient resides alone and previously capable of ADLs independently. Now dependent on walker on wheels for ambulation and refused home services/home health aide after discharge from [facility] about 2 weeks ago. With the exception of opioids, the daughter believes that her mother has not been taking her medications as prescribed or eating adequately since returning home. On chart review, patient was discharge on Lantus 22U qhs and Humalog 8 U TID with meals but the patient's daughter says that the patient did not want to take insulin.         The patient was a code stroke in the ED after CT head showed a large acute L sided MCA/PCA stroke. She was also found to be in afib with RVR with FS in the 400s. She was initially started on a cardizem drip and insulin drip and MICU was consulted. She was not deemed a MICU candidate given her VBG did not show acidosis in spite of a serum betahydroxyburtyrate > 3 so she was transitioned from an insulin gtt to bolus insulin. She was then admitted to [facility] for further management. On the medicine floor she was placed on a nonrebreather for mild respiratory distress. The patient's daughter (present in person) and son (via telephone) decided to make the patient DNR/DNI and did not want to pursue aggressive measure. Endocrine was consulted and helped managed her insulin. Repeat CT head at 24 hours showed evolving infarcts.         Palliative and hospice care services met with patient's daughter. Decision was made to make patient full comfort and put on morphine drip. Taken off telemetry. Daughter decided on a hospice facility. 89 yo female with a PMHx of afib on xarelto, HTN, HLD, uncontrolled T2DM, and recent admission for R hip fracture s/p nail fixation who was BIBEMS for unresponsiveness after being found down in her home. Her physical therapist called EMS after the patient did not respond to multiple calls. Last known to alert and oriented on 6/7/19 when she spoke to her daughter.  Patient resides alone and previously capable of ADLs independently. Now dependent on walker on wheels for ambulation and refused home services/home health aide after discharge from [facility] about 2 weeks ago. With the exception of opioids, the daughter believes that her mother has not been taking her medications as prescribed or eating adequately since returning home. On chart review, patient was discharge on Lantus 22U qhs and Humalog 8 U TID with meals but the patient's daughter says that the patient did not want to take insulin.         The patient was a code stroke in the ED after CT head showed a large acute L sided MCA/PCA stroke. She was also found to be in afib with RVR with FS in the 400s. She was initially started on a cardizem drip and insulin drip and MICU was consulted. She was not deemed a MICU candidate given her VBG did not show acidosis in spite of a serum betahydroxyburtyrate > 3 so she was transitioned from an insulin gtt to bolus insulin. She was then admitted to [facility] for further management. On the medicine floor she was placed on a nonrebreather for mild respiratory distress. The patient's daughter (present in person) and son (via telephone) decided to make the patient DNR/DNI and did not want to pursue aggressive measure. Endocrine was consulted and helped managed her insulin. Repeat CT head at 24 hours showed evolving infarcts and cerebral edema.         Palliative and hospice care services met with patient's daughter. Decision was made to make patient full comfort and put on morphine drip. Taken off telemetry. Daughter decided on a hospice facility. Patient is now being maintained on a morphine gtt for tachypnea.

## 2019-06-11 NOTE — PROGRESS NOTE ADULT - PROBLEM SELECTOR PLAN 7
- after extensive discussion with daughter, she voices that her mother would not have wanted aggressive life support as she was previously very independent   - confirmed DNR/DNI/no feeding tube at this time  - MOLST filled out  - continue supportive, comfort directed measures including oxygen treatments and pain medications (morphine gtt w/t Q2 PRN breakthrough tachypnea) - no active management at this time.

## 2019-06-11 NOTE — CHART NOTE - NSCHARTNOTEFT_GEN_A_CORE
MEDICINE NOTE    Called to bedside to evaluate the patient for unresponsiveness .     On physical exam, patient did not respond to verbal or noxious stimuli.  No spontaneous respirations.  Absent heart and breath sounds.  Absent radial and carotid pulses.   Pupils are fixed and dilated, no corneal reflex.  EKG rhythm strip shows asystole.   Patient pronounced dead at 16:05 PM.  Attending notified.  Family declined autopsy.

## 2019-06-11 NOTE — PROGRESS NOTE ADULT - PROBLEM SELECTOR PLAN 5
Daughter does not want mom to suffer. Is awaiting inpatient placement for continuation of management of symptoms. GUTIERREZ in Chart,  DNR/DNI.

## 2019-06-11 NOTE — PROGRESS NOTE ADULT - PROBLEM SELECTOR PLAN 2
- acute encephalopathy likely secondary to acute CVA as outlined above versus less likely infection versus metabolic   - continue plan of care as outlined above
Initiate Glycopyrrolate 0.4mg q6h ATC.

## 2019-06-11 NOTE — PROGRESS NOTE ADULT - PROBLEM SELECTOR PLAN 4
- patient with afib with RVR 2/2 noncompliance supposed to be on metoprolol and Xarelto at home  - holding all AC at this time 2/2 large size of CVA and risk of hemorrhagic conversion/rebleeding  - likely contributed by respiratory distress  - d/c tele monitor. - A1c 11 in April 2019, refused to take insulin. Presented with AGAP and +BHB.  - NPO   - d/c FSG and insulin per plan for comfort care measures.

## 2019-06-11 NOTE — PROGRESS NOTE ADULT - PROBLEM SELECTOR PLAN 5
- patient initially with lactate of 6 which has subsequently downtrended to 3.5  - etiology DKA versus seizure event precipitated by ICH with DKA less likely given lack of acidosis on VBG - patient with afib with RVR 2/2 noncompliance supposed to be on metoprolol and Xarelto at home  - holding all AC at this time 2/2 large size of CVA and risk of hemorrhagic conversion/rebleeding  - likely contributed by respiratory distress  - d/c tele monitor.

## 2019-06-11 NOTE — PROGRESS NOTE ADULT - PROBLEM SELECTOR PLAN 1
Patient remains tachypneic on morphine drip of 0.5mg, would recommend increasing drip to 1mg/hr. Patient remains tachypneic on morphine drip of 0.5mg, would recommend increasing drip to 1mg/hr. Increase Morphine 2mg q2h IV PRN.

## 2019-06-11 NOTE — PROGRESS NOTE ADULT - PROBLEM SELECTOR PLAN 3
- A1c 11 in April 2019, refused to take insulin. Presented with AGAP and +BHB.  - NPO   - d/c FSG and insulin per plan for comfort care measures. 2/2 CVA likely result of increased ICP  - continue titrating morphine gtt

## 2019-06-11 NOTE — PROGRESS NOTE ADULT - ATTENDING COMMENTS
Pt seen with NP.  Agree with above. Increase gtt to 1mg/hr with 2mg prn.   Add yoseph atc.   Awaiting hospice inpatient bed.
Austin Eid MD  pager# [phone]
Grave prognosis.    Austin Eid MD  pager# [phone]
Patient was seen and examined personally by me. I have discussed the plan and reviewed the resident's note and agree with the above physical exam findings including assessment and plan except as indicated below.    Repeat CTH if vitals remain stable, Neuro following  Endo consult for uncontrolled FS while NPO, started on lantus 8 U qhs and low correction SSI q6, A1C: 11.6, pt nonadherent at home with meds/diet  DNR/DNI, possible hospice if no improvement in mental status  Continue cardizem gtt for rate control of Afib

## 2019-07-08 NOTE — DIETITIAN INITIAL EVALUATION ADULT. - SOURCE
Nephrology Progress Note        Subjective: patient evaluated on dialysis. Pt is stable on dialysis. Patient had fistulogram performed earlier this morning through interventional radiology. We were able to cannulate his fistula without problems. No new issues overnite. Stable hemodynamics. He is approximately 3.7 kg over his dry weight, his potassium is elevated at 5.9 and his  bicarbonate level is 15  Patient currently denies any nausea, vomiting, patient also denies any chest pain this morning. Objective:  CURRENT TEMPERATURE:  Temp: 97.2 °F (36.2 °C)  MAXIMUM TEMPERATURE OVER 24HRS:  Temp (24hrs), Av °F (36.7 °C), Min:97.2 °F (36.2 °C), Max:98.4 °F (36.9 °C)    CURRENT RESPIRATORY RATE:  Resp: 17  CURRENT PULSE:  Pulse: 92  CURRENT BLOOD PRESSURE:  BP: 113/66  24HR BLOOD PRESSURE RANGE:  Systolic (55XVC), FSE:910 , Min:88 , WOK:598   ; Diastolic (12IJI), JAY:31, Min:39, Max:68    24HR INTAKE/OUTPUT:  No intake or output data in the 24 hours ending 19 1119  Patient Vitals for the past 96 hrs (Last 3 readings):   Weight   19 0538 201 lb 1.6 oz (91.2 kg)   19 0617 200 lb 3.2 oz (90.8 kg)   19 1016 198 lb 3.1 oz (89.9 kg)         Physical Exam:  General appearance:Awake, alert, in no acute distress  Skin: warm and dry, no rash or erythema  Eyes: conjunctivae normal and sclera anicteric  ENT:no thrush no pharyngeal congestion   Neck:  + JVD, No Thyromegaly  Pulmonary: Creased breath sounds on auscultation  Cardiovascular: normal S1 and S2. NO rubs and NO murmur.  No S3 OR S4   Abdomen: soft nontender, bowel sounds present, no organomegaly,  no ascites   Extremities: + edema left lower extremity, below-knee amputation right    Access:  previous  Right AV fistula    Current Medications:      oxyCODONE-acetaminophen (PERCOCET) 5-325 MG per tablet 1 tablet Q4H PRN   Or    oxyCODONE-acetaminophen (PERCOCET) 5-325 MG per tablet 2 tablet Q4H PRN   predniSONE (DELTASONE) tablet 20 mg Daily other (specify)/patient/RN, electronic chart

## 2019-07-08 NOTE — H&P ADULT - ASSESSMENT
[FreeTextEntry1] : Clinically stable. I believe he has an active infection. I do not see any evidence of sinonasal polyposis at this time.\par I will follow up as culture later in the week.\par I will empirically start him on Augmentin, a probiotic, hypertonic saline irrigations and Flonase. He was seen in follow up in 2 weeks time or as needed.
89 year old woman with Afib on Xarelto, HTN, HLD, uncontrolled T2DM  and recent right hip fracture s/p nail fixation now minimally responsive with acute ischemic strokes in left MCA and PCA distribution likely cardioembolic etiology in setting of Afib with RVR, complicated by hyperglycemia likely secondary to medication non compliance.

## 2019-11-18 NOTE — PROGRESS NOTE ADULT - PROBLEM SELECTOR PROBLEM 6
Sepsis Week 1 Survey      Responses   Facility patient discharged from?  [place]   Does the patient have one of the following disease processes/diagnoses(primary or secondary)?  Sepsis   Is there a successful TCM telephone encounter documented?  No   Week 1 attempt successful?  No   Unsuccessful attempts  Attempt 2          Genesis Valle RN  
Memory deficit

## 2020-01-01 NOTE — PROGRESS NOTE ADULT - PROBLEM SELECTOR PROBLEM 5
DM2 (diabetes mellitus, type 2) FREE:[LAST:[Nirali],FIRST:[Thai],PHONE:[(   )    -],FAX:[(   )    -],ADDRESS:[[facility]  [address] [place] [address], [address] [address] [phone]]]

## 2020-05-20 NOTE — OCCUPATIONAL THERAPY INITIAL EVALUATION ADULT - PHYSICAL ASSIST/NONPHYSICAL ASSIST: SIT/SUPINE, REHAB EVAL
Requesting Ozempic 1 mg  LOV: 2/18/20  RTC: 3 months  Last Relevant Labs: 11/14/19  Filled: 2/18/20 #2 pens with 2 refills    No future appointments. Overdue for appt.
verbal cues/nonverbal cues (demo/gestures)/1 person assist

## 2020-07-09 NOTE — H&P ADULT - ATTENDING COMMENTS
Patient calling would like to have fasting labs drawn prior to upcoming appointment, will get done at [facility]. Pt seen and examined at bedside. Agree with resident H&P and plan as edited above.

## 2020-07-28 NOTE — BEHAVIORAL HEALTH ASSESSMENT NOTE - PRIMARY DX
Initiate Treatment: Ketoconazole cream- Apply to Affected Areas Twice Daily for one month.\\nTriamcinolone Cream- Apply To Affected Areas Twice Daily for Two weeks then as needed for flares. Detail Level: Zone Delirium due to another medical condition

## 2021-07-07 NOTE — PATIENT PROFILE ADULT - HAS THE PATIENT BEEN ADMITTED FROM SHORT TERM REHAB?
You can access the FollowMyHealth Patient Portal offered by [facility] by registering at the following website: [email or web address]. By joining [facility]’s FollowMyHealth portal, you will also be able to view your health information using other applications (apps) compatible with our system. no

## 2021-09-10 NOTE — PROGRESS NOTE ADULT - PROBLEM SELECTOR PROBLEM 1
Hematoma of iliopsoas muscle, right, initial encounter Gabapentin Counseling: I discussed with the patient the risks of gabapentin including but not limited to dizziness, somnolence, fatigue and ataxia.

## 2022-01-20 NOTE — CONSULT NOTE ADULT - CONSULT REASON
Venlafaxine XR 75 mg - d/c'd    Venlafaxine  mg - 1 month sent to [facility] in [place].     Next appt 2/9/22, 12:15 pm - VIDEO VISIT.   Uncontroled Diabetes

## 2022-02-10 NOTE — H&P ADULT - HEMATOLOGY/LYMPHATICS
negative Drysol Counseling:  I discussed with the patient the risks of drysol/aluminum chloride including but not limited to skin rash, itching, irritation, burning.

## 2022-03-17 NOTE — PROGRESS NOTE ADULT - PROBLEM SELECTOR PLAN 1
- patient with acute L sided PCA/MCA infarcts with resultant R sided paralysis, fixed gaze, and encephalopathy   - appreciated neuro recs, admit to tele, repeat CT head in 24 hours (8:30pm on 6/9)   - permissive hypertension to 220/120  - NPO pending dysphagia screen if able to participate given mental status   - patient now with labored breathing and tachypnea with concern for aspiration  - per discussion with patient's daughter, patient would not have wanted aggressive interventions --> discussed comfort with patient's daughter who agrees Yes Large acute L sided PCA/MCA infarcts with effacement on CT resulting in R sided paralysis, fixed gaze, and encephalopathy   - appreciated neuro recs, admit to tele, repeat CT head in 24 hours (8:30pm on 6/9)   - permissive hypertension to 220/120  - NPO pending dysphagia screen if able to participate given mental status   - patient now with labored breathing and tachypnea with concern for aspiration  - per discussion with patient's daughter, patient would not have wanted aggressive interventions --> DNR/DNI, no PEG.  -Hospice consult tomorrow AM

## 2022-08-30 NOTE — H&P ADULT - NSHPPOAPRESSUREULCER_GEN_ALL_CORE
Left message for diabetic education at [facility] to see if they are accepting outside referrals.  If so, will need fax number.   no

## 2022-09-26 NOTE — PROGRESS NOTE ADULT - PROBLEM SELECTOR PLAN 3
Occupational Therapy Evaluation     Patient Name: Emre Silverman  NVVDN'O Date: 9/26/2022  Problem List  Principal Problem:    Saddle embolus of pulmonary artery (HCC)  Active Problems:    Metastatic breast cancer ([facility] )    Anxiety    Mixed hyperlipidemia    Primary hypertension    Toxic gastroenteritis and colitis    REYES (acute kidney injury) ([facility] )    Anemia    Tachycardia    Urinary retention    Swelling of lower extremity    Past Medical History  Past Medical History:   Diagnosis Date    Abnormal weight loss     Allergic reaction     Anxiety     Breast cancer, right ([facility] ) 2011    right    Cancer ([facility] ) 2012    Candidal vulvovaginitis     Clotting disorder ([place] [id number] ) Same as above    Endometrial hyperplasia     Epithelial cyst     benign, ovary    GI (gastrointestinal bleed) Blood mixed with stool    History of radiation therapy 2011    right breast cancer    Hyperlipidemia     Hypertension     Internal hemorrhoids     Knee tendonitis     Ovarian cyst     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[facility])      Past Surgical History  Past Surgical History:   Procedure Laterality Date    BILATERAL SALPINGOOPHORECTOMY      onset: 7/23/13    BREAST BIOPSY Right 06/13/2006    benign    BREAST BIOPSY Right 03/02/2011    malignant    BREAST LUMPECTOMY Right 03/30/2011    malignant    CATARACT EXTRACTION W/  INTRAOCULAR LENS IMPLANT Right     phacoemulsification  Onset: 10/27/14    CHOLECYSTECTOMY      COLONOSCOPY  2017    approx    HYSTERECTOMY  Yes    INTRAOPERATIVE RADIATION THERAPY (I[facility])      IR BIOPSY BONE  7/9/2021    IR IVC FILTER PLACEMENT OPTIONAL/TEMPORARY  9/23/2022    IR PE ENDOVASCULAR THERAPY  9/22/2022    IR PICC PLACEMENT SINGLE LUMEN  8/19/2022    SKIN LESION EXCISION Right     breast, single lesion    TONSILLECTOMY AND ADENOIDECTOMY             09/26/22 0913   OT Last Visit   OT Visit Date 09/26/22   Note Type   Note type Evaluation   Restrictions/Precautions   Other Precautions Multiple lines;Telemetry; Fall Risk;Contact/isolation   Pain Assessment   Pain Assessment Tool 0-10   Pain Score No Pain   Home Living   Type of Home Apartment   Home Layout One level   Bathroom Shower/Tub Walk-in shower   Bathroom Toilet Standard   Bathroom Equipment Shower chair   Bathroom Accessibility Accessible   Home Equipment Walker   Additional Comments Pt reports living in an apartment with 0 PALMA  Prior Function   Level of [place] Independent with ADLs and functional mobility   Lives With Alone   Receives Help From Family   ADL Assistance Independent   IADLs Independent   Falls in the last 6 months 1 to 4  (1)   Vocational Retired   Lifestyle   Autonomy Pt reports being I with ADLS, IADLS and mobility without device PTA  (+)    Reciprocal Relationships Pt lives alone but reports that she has supportive family able to assist   Service to Others Retired   [address] Enjoys staying active   Subjective   Subjective "I'm scared to fall"   ADL   Where Assessed Edge of bed   Eating Assistance 5  Supervision/Setup   Grooming Assistance 5  Supervision/Setup   UB Bathing Assistance 4  Minimal Assistance   LB [address] 3  Moderate Assistance   [address] 4  [address] 3  Moderate [address]  3  Moderate Assistance   Bed Mobility   Supine to Sit 3  Moderate assistance   Additional items Assist x 2; Increased time required;Verbal cues;LE management   Transfers   Sit to Stand 3  Moderate assistance   Additional items Assist x 2; Increased time required;Verbal cues   Stand to Sit 3  Moderate assistance   Additional items Assist x 2; Increased time required;Verbal cues   Functional Mobility   Functional Mobility 3  Moderate assistance   Additional Comments Pt took a few steps from bed to chair with mod A x2     Additional items Hand hold assistance   Balance   Static Sitting Fair   Dynamic Sitting Fair -   Static Standing Poor +   Dynamic Standing Poor Ambulatory Poor   Activity Tolerance   Activity Tolerance Patient limited by fatigue   Medical Staff Made Aware Seen with PT 2* medical complexity/ instability   Nurse Made Aware RN confirmed okay to see pt   RUE Assessment   RUE Assessment WFL   LUE Assessment   LUE Assessment WFL   Cognition   Overall Cognitive Status WFL   Arousal/Participation Alert; Cooperative   Attention Within functional limits   Orientation Level Oriented X4   Memory Decreased recall of precautions   Following Commands Follows one step commands without difficulty   Comments Pt is very fearful of mobility, but is very pleasant and cooperative  Assessment   Limitation Decreased ADL status; Decreased endurance;Decreased self-care trans;Decreased high-level ADLs   Prognosis Good   Assessment Pt is a 80 y o  female admitted to [facility] on 9/22/2022 w/ saddle embolus of pulmonary artery s/p embolectomy and IVC filter placement on 9/23/22  Pt  has a past medical history of Abnormal weight loss, Allergic reaction, Anxiety, Breast cancer, right ([facility] ) (2011), Cancer ([facility] ) (2012), Candidal vulvovaginitis, Clotting disorder ([facility] ) (Same as above), Endometrial hyperplasia, Epithelial cyst, GI (gastrointestinal bleed) (Blood mixed with stool), History of radiation therapy (2011), Hyperlipidemia, Hypertension, Internal hemorrhoids, Knee tendonitis, Ovarian cyst, and 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[facility] )  Pt with active OT orders and up with assistance  orders  Pt resides in an apartment with 0 PALMA  Pt lives alone but reports that she has supportive family  Pt was I w/  ADLS and IADLS, (+) drove, & required no use of DME PTA  Currently pt is mod A x2 for functional transfers and functional mobility, min A for UB ADLS and mod A for LB ADLS  Pt is limited at this time 2*: endurance, activity tolerance, functional mobility, forward functional reach, functional mobility, functional standing tolerance and decreased I w/ ADLS/IADLS  The following Occupational Performance Areas to address include: grooming, bathing/shower, toilet hygiene, dressing, functional mobility and clothing management  Based on the aforementioned OT evaluation, functional performance deficits, and assessments, pt has been identified as a high complexity evaluation  From OT standpoint, anticipate d/c STR  Pt to continue to benefit from acute immediate OT services to address the following goals 3-5x/week to  w/in 10-14 days: Dea Cruel Goals   Patient Goals To feel better   LTG Time Frame 10-   Long Term Goal #1 See goals below   Plan   Treatment Interventions ADL retraining;UE strengthening/ROM; Functional transfer training; Endurance training;Patient/family training; Compensatory technique education;Continued evaluation; Energy conservation; Activityengagement   Goal Expiration Date 10/10/22   OT Frequency 3-5x/wk   Recommendation   OT Discharge Recommendation Post acute rehabilitation services   AM-PAC Daily Activity Inpatient   Lower Body Dressing 2   Bathing 2   Toileting 2   Upper Body Dressing 3   Grooming 4   Eating 4   Daily Activity Raw Score 17   Daily Activity Standardized Score (Calc for Raw Score >=11) 37 26   AM-PAC Applied Cognition Inpatient   Following a Speech/Presentation 3   Understanding Ordinary Conversation 4   Taking Medications 4   Remembering Where Things Are Placed or Put Away 4   Remembering List of 4-5 Errands 4   Taking Care of Complicated Tasks 3   Applied Cognition Raw Score 22   Applied Cognition Standardized Score 47 83   Modified [place] Scale   Modified [place] Scale 4       GOALS    1) Pt will increase activity tolerance to G for 30 min txment sessions    2) Pt will complete UB/LB dressing/self care w/ mod I using adaptive device and DME as needed    3) Pt will complete bathing w/ Mod I w/ use of AE and DME as needed    4) Pt will complete toileting w/ mod I w/ G hygiene/thoroughness using DME as needed    5) Pt will improve functional transfers to Mod I on/off all surfaces using DME as needed w/ G balance/safety     6) Pt will improve functional mobility during ADL/IADL/leisure tasks to Mod I using DME as needed w/ G balance/safety     7) Pt will participate in simulated IADL management task with DME as needed to increase independence to  w/ G safety and endurance    8) Pt will demonstrate G carryover of pt/caregiver education and training as appropriate  9) Pt will demonstrate 100% carryover of energy conservation techniques t/o functional I/ADL/leisure tasks w/o cues s/p skilled education    10) Pt will independently identify and utilize 2-3 coping strategies to increase positive affect and promote overall well-being      11) Pt will engage in ongoing cognitive assessment w/ G participation to assist w/ safe d/c planning/recommendations    VARGAS Esposito, OTR/L - patient with afib with RVR on metoprolol and Xarelto at home  - likely non compliant with medications and may have triggered this  - holding all AC at this time - A1c 11 in April 2019, refused to take insulin. Presented with AGAP and +BHB.  - NPO   - FSG Q6H SSI  - Endo consult for continued AGAP and ketones in spite of no acidosis  -Glargine 8u qhs  -Lispro 2u q6h

## 2023-01-01 NOTE — ED ADULT NURSE REASSESSMENT NOTE - NS ED NURSE REASSESS COMMENT FT1
Pt rcvd back from CT scan following break coverage. Pt w/ MD Verdugo @ bedside - wrapped affected knee, awaiting to see if can stand/ambulate w/ ED MD.  Pt in no acute distress.
Pt received spot 9, moved to 26A.  Pt AOX3. Skin is warm.  Pt noted to have multiple bruises throughout upper extremity.  Assisted with AM care.  Pt noted to have ace wrap to left knee area, same elevated.  Positive pedal pulse noted at this time.  Pt with right FA 20 g in place, site dry and intact at this time.  Pt provided with breakfast tray.  In NAD at this time.  Continue to monitor.
pt unable to walk or tolerate weight baring w/ 2 RN asst.  Brought back into bed, positioned for comfort.  To be admitted.
Yes

## 2023-03-28 NOTE — BEHAVIORAL HEALTH ASSESSMENT NOTE - AFFECT QUALITY
Pt has hx of Afib, home meds include digoxin 125 mcg, Coreg 6.25mg BID and Eliquis 5mg BID    Recommend:  - Obtain digoxin level in AM, cont digoxin 125mcg PO qd if level <1.2  - Hold eliquis ISO hematuria  - Cont carvedilol 6.25mg PO q12hrs Pt has hx of Afib, home meds include digoxin 125 mcg, Coreg 6.25mg BID and Eliquis 5mg BID    Recommend:  - Obtain digoxin level in AM, cont digoxin 125mcg PO qd if level <1.2  - Hold eliquis ISO hematuria  - Hold carvedilol 6.25mg PO q12hrs until AM, pending Hgb status Pt has hx of Afib, home meds include digoxin 125 mcg, Coreg 6.25mg BID and Eliquis 5mg BID    Recommend:  - Obtain digoxin level in AM, cont digoxin 125mcg PO qd if level <1.2  - Hold eliquis ISO hematuria  - Hold carvedilol 6.25mg PO q12hrs until AM, pending Hgb status  - Obtain EKG Pt has hx of Afib, home meds include digoxin 125 mcg, Coreg 6.25mg BID and Eliquis 5mg BID    Recommend:  - Obtain digoxin level in AM, cont digoxin 125mcg PO qd if level <1.2  - Hold eliquis ISO hematuria  - Hold carvedilol 6.25mg PO q12hrs for now; restart if HD stable and hgb remains stable.   - Obtain EKG Pt has hx of Afib, home meds include digoxin 125 mcg, Coreg 6.25mg BID and Eliquis 5mg BID    Recommend:  - Obtain digoxin level in AM, cont digoxin 125mcg PO qd if level <1.2  - Hold eliquis ISO hematuria  - Hold carvedilol 6.25mg PO q12hrs for now; restart w/ hold parameters if HD stable and hgb remains stable.   - Obtain EKG Irritable/Anxious

## 2024-01-01 NOTE — DISCHARGE NOTE ADULT - PLAN OF CARE
Pain management You were admitted to the hospital after a fall. Please maintain a safe environment where you reside. It is recommended that you move in a careful manner to prevent future events. Perform any exercises recommended by physical therapy and follow-up with your PCP. Please continue your medications as directed and follow-up with your primary provider/cardiologist to further manage your care. Monitor for signs/symptoms of uncontrolled atrial fibrillation, such as, increased heart rate, palpitations, chest pain, dizziness, or shortness of breath - Return to emergency room if these signs/symptoms are present. Neurology was consulted _________________ *** ENDO CONSULTED ________________________ Continue your medication regimen and a consistent carbohydrate diet (Meaning eating the same amount of carbohydrates at the same time each day). Monitor blood glucose levels throughout the day before meals and at bedtime. Record blood sugars and bring to outpatient providers appointment in order to be reviewed by your doctor for management modifications. Monitor for signs/symptoms of low blood glucose, such as, dizziness, altered mental status, or cool/clammy skin. In addition, monitor for signs/symptoms of high blood glucose, such as, feeling hot, dry, fatigued, or with increased thirst/urination. Make regular podiatry appointments in order to have feet checked for wounds and uncontrolled toe nail growth to prevent infections, as well as, appointments with an ophthalmologist to monitor your vision. Continue cholesterol control medications. Continue DASH diet. Follow up with your PCP within 1 week of discharge for further management and monitoring of lipid and cholesterol panels. HCTZ ON DC _____________________Continue blood pressure medication regimen as directed. Monitor for any visual changes, headaches or dizziness.  Monitor blood pressure regularly.  Follow up with your PCP for further management for high blood pressure. hard copy, drawn during this pregnancy Continue tylenol as needed for pain  - continue to participate in physical therapy activities Neurology recommended having outpatient neuropsych testing with an neurologist Continue your medication regimen and a consistent carbohydrate diet (Meaning eating the same amount of carbohydrates at the same time each day). Monitor blood glucose levels throughout the day before meals and at bedtime. Record blood sugars and bring to outpatient providers appointment in order to be reviewed by your doctor for management modifications. Monitor for signs/symptoms of low blood glucose, such as, dizziness, altered mental status, or cool/clammy skin. In addition, monitor for signs/symptoms of high blood glucose, such as, feeling hot, dry, fatigued, or with increased thirst/urination. Make regular podiatry appointments in order to have feet checked for wounds and uncontrolled toe nail growth to prevent infections, as well as, appointments with an ophthalmologist to monitor your vision.  Follow up with endocrinology - Continue blood pressure medication regimen as directed. Monitor for any visual changes, headaches or dizziness.  Monitor blood pressure regularly.  Follow up with your PCP for further management for high blood pressure.  Discharge on hydrochlorothiazide daily

## 2024-09-30 NOTE — ED ADULT NURSE NOTE - FALL HARM RISK
Received call from Fitchburg General [facility].  
age(85 years old or older)